# Patient Record
Sex: MALE | Race: BLACK OR AFRICAN AMERICAN | NOT HISPANIC OR LATINO | Employment: OTHER | URBAN - METROPOLITAN AREA
[De-identification: names, ages, dates, MRNs, and addresses within clinical notes are randomized per-mention and may not be internally consistent; named-entity substitution may affect disease eponyms.]

---

## 2017-01-31 ENCOUNTER — OFFICE VISIT (OUTPATIENT)
Dept: UROLOGY | Facility: CLINIC | Age: 67
End: 2017-01-31
Attending: UROLOGY
Payer: COMMERCIAL

## 2017-01-31 VITALS
DIASTOLIC BLOOD PRESSURE: 77 MMHG | BODY MASS INDEX: 39.87 KG/M2 | WEIGHT: 254 LBS | SYSTOLIC BLOOD PRESSURE: 130 MMHG | HEART RATE: 86 BPM | HEIGHT: 67 IN

## 2017-01-31 DIAGNOSIS — Z80.42 FAMILY HISTORY OF PROSTATE CANCER: ICD-10-CM

## 2017-01-31 DIAGNOSIS — R35.1 NOCTURIA: ICD-10-CM

## 2017-01-31 DIAGNOSIS — R97.20 ELEVATED PSA: Primary | ICD-10-CM

## 2017-01-31 LAB
BILIRUB SERPL-MCNC: NORMAL MG/DL
BLOOD URINE, POC: NORMAL
COLOR, POC UA: NORMAL
GLUCOSE UR QL STRIP: NORMAL
KETONES UR QL STRIP: NORMAL
LEUKOCYTE ESTERASE URINE, POC: NORMAL
NITRITE, POC UA: NORMAL
PH, POC UA: 5
PROTEIN, POC: NORMAL
SPECIFIC GRAVITY, POC UA: 1.01
UROBILINOGEN, POC UA: NORMAL

## 2017-01-31 PROCEDURE — 3078F DIAST BP <80 MM HG: CPT | Mod: S$GLB,,, | Performed by: UROLOGY

## 2017-01-31 PROCEDURE — 1157F ADVNC CARE PLAN IN RCRD: CPT | Mod: S$GLB,,, | Performed by: UROLOGY

## 2017-01-31 PROCEDURE — 1159F MED LIST DOCD IN RCRD: CPT | Mod: S$GLB,,, | Performed by: UROLOGY

## 2017-01-31 PROCEDURE — 99999 PR PBB SHADOW E&M-EST. PATIENT-LVL III: CPT | Mod: PBBFAC,,, | Performed by: UROLOGY

## 2017-01-31 PROCEDURE — 1126F AMNT PAIN NOTED NONE PRSNT: CPT | Mod: S$GLB,,, | Performed by: UROLOGY

## 2017-01-31 PROCEDURE — 3075F SYST BP GE 130 - 139MM HG: CPT | Mod: S$GLB,,, | Performed by: UROLOGY

## 2017-01-31 PROCEDURE — 99203 OFFICE O/P NEW LOW 30 MIN: CPT | Mod: 25,S$GLB,, | Performed by: UROLOGY

## 2017-01-31 PROCEDURE — 1160F RVW MEDS BY RX/DR IN RCRD: CPT | Mod: S$GLB,,, | Performed by: UROLOGY

## 2017-01-31 PROCEDURE — 81002 URINALYSIS NONAUTO W/O SCOPE: CPT | Mod: S$GLB,,, | Performed by: UROLOGY

## 2017-01-31 NOTE — LETTER
January 31, 2017      Remy Payne MD  [address]  [facility] [address]           [facility]  [address] [place] [address], [facility] [address]  [facility] [address]  Phone: [phone]          Patient: Bryan Queen   MR Number: [id number]   YOB: 1950   Date of Visit: 1/31/2017       Dear Dr. Remy Payne:    Thank you for referring Bryan Queen to me for evaluation. Attached you will find relevant portions of my assessment and plan of care.    If you have questions, please do not hesitate to call me. I look forward to following Bryan Queen along with you.    Sincerely,    Kieran Evans MD    Enclosure  CC:  No Recipients    If you would like to receive this communication electronically, please contact [email or web address] or [phone] to request more information on [facility] [employer] access.    For providers and/or their staff who would like to refer a patient to [facility], please contact us through our one-stop-shop provider referral line, [facility], at [phone].    If you feel you have received this communication in error or would no longer like to receive these types of communications, please e-mail [email or web address]

## 2017-01-31 NOTE — PROGRESS NOTES
"Subjective:      Bryan Queen is a 66 y.o. male who was referred by pcp for evaluation of elevated PSA.  66-year-old -American man with 2 brothers who have been diagnosed with prostate cancer.  His urinary stream is strong.  He takes Flomax.  He does have nocturia however 3 times per night which is bothersome.          The following portions of the patient's history were reviewed and updated as appropriate: allergies, current medications, past family history, past medical history, past social history, past surgical history and problem list.    Review of Systems  Constitutional: no fever or chills  ENT: no nasal congestion or sore throat  Respiratory: no cough or shortness of breath  Cardiovascular: no chest pain or palpitations  Gastrointestinal: no nausea or vomiting, tolerating diet  Genitourinary: as per HPI  Hematologic/Lymphatic: no easy bruising or lymphadenopathy  Musculoskeletal: no arthralgias or myalgias  Neurological: no seizures or tremors  Behavioral/Psych: no auditory or visual hallucinations     Objective:   Vitals:   Visit Vitals    /77 (BP Location: Right arm, Patient Position: Sitting, BP Method: Automatic)    Pulse 86    Ht 5' 7" (1.702 m)    Wt 115.2 kg (254 lb)    BMI 39.78 kg/m2       Physical Exam   General: alert and oriented, no acute distress  Head: normocephalic, atraumatic  Neck: supple, no lymphadenopathy, normal ROM, no masses  Respiratory: Symmetric expansion, non-labored breathing  Cardiovascular: regular rate and rhythm, nomal pulses, no peripheral edema  Abdomen: Rather protuberant with panniculus and soft, non tender, non distended, no palpable masses, no hernias, no hepatomegaly or splenomegaly  Genitourinary:   Penis: Concealed, normal, no lesions, patent orthotopic meatus, no plaques  Scrotum: no rashes or skin changes;   Testes: descended bilaterally, no masses, nontender, normal epididymides bilaterally, no hydroceles  Prostate: normal for age, normal " consistency, non tender, no specific nodules, 20 g; seminal vesicles not palpated  Rectum: normal rectal tone, no rectal mass, normal perineum  Lymphatic: no inguinal nodes  Skin: normal coloration and turgor, no rashes, no suspicious skin lesions noted  Neuro: alert and oriented x3, no gross deficits  Psych: normal judgment and insight, normal mood/affect and non-anxious    Physical Exam    Lab Review   Urinalysis demonstrates negative for all components  Lab Results   Component Value Date    WBC 9.60 08/20/2015    HGB 11.5 (L) 08/20/2015    HCT 35.6 (L) 08/20/2015    MCV 91 08/20/2015     08/20/2015     Lab Results   Component Value Date    CREATININE 1.3 12/21/2016    BUN 20 12/21/2016     Lab Results   Component Value Date    PSA 4.6 (H) 12/21/2016     Lab Results   Component Value Date    PSA 4.6 (H) 12/21/2016    PSA 1.29 02/27/2013    PSA 1.1 07/01/2011         Imaging  -  Assessment:     1. Elevated PSA    2. Family history of prostate cancer    3. Nocturia      -  Plan:     Orders Placed This Encounter    Prostate Specific Antigen, Diagnostic    POCT URINE DIPSTICK WITHOUT MICROSCOPE       Cont flomax    Avoid pm fluids  Avoid caffeine and alcohol  Timed voiding    Return to clinic 1 month with repeat PSA and postvoid residual.  If PSA is still elevated we will schedule a prostate biopsy

## 2017-01-31 NOTE — MR AVS SNAPSHOT
[facility]  [address] [place] [address], [facility] [address]  [facility] [address]  Phone: [phone]                  Bryan Queen   2017 1:00 PM   Office Visit    Description:  Male : 1950   Provider:  Kieran Evans MD   Department:  [facility]           Reason for Visit     Elevated PSA           Diagnoses this Visit        Comments    Elevated PSA    -  Primary     Family history of prostate cancer         Nocturia                To Do List           Future Appointments        Provider Department Dept Phone    3/7/2017 10:30 AM Kieran Evans MD [facility] [phone]      Goals (5 Years of Data)     None      Follow-Up and Disposition     Return in about 4 weeks (around 2017).      [facility]     [facility] Care [facility] -  Assistance  Registered nurses in the [facility] Center provide clinical advisement, health education, appointment booking, and other advisory services.  Call for this free service at [phone].             Medications           Message regarding Medications     Verify the changes and/or additions to your medication regime listed below are the same as discussed with your clinician today.  If any of these changes or additions are incorrect, please notify your healthcare provider.        STOP taking these medications     ondansetron (ZOFRAN) 8 MG tablet Take 1 tablet (8 mg total) by mouth every 8 (eight) hours as needed for Nausea.    naproxen sodium (ANAPROX) 220 MG tablet Take 220 mg by mouth daily as needed.           Verify that the below list of medications is an accurate representation of the medications you are currently taking.  If none reported, the list may be blank. If incorrect, please contact your healthcare provider. Carry this list with you in case of emergency.           Current Medications     baclofen (LIORESAL) 20 MG tablet Take 1 tablet (20 mg total) by mouth 4 (four) times daily.    duloxetine (CYMBALTA) 60 MG  "capsule Take 1 capsule (60 mg total) by mouth once daily.    tamsulosin (FLOMAX) 0.4 mg Cp24 TAKE 1 CAPSULE (0.4 MG TOTAL) BY MOUTH ONCE DAILY.    docusate sodium (COLACE) 100 MG capsule Take 1 capsule (100 mg total) by mouth 2 (two) times daily as needed for Constipation.    oxycodone-acetaminophen (PERCOCET)  mg per tablet Take 1 tablet by mouth daily as needed for Pain.           Clinical Reference Information           Vital Signs - Last Recorded  Most recent update: 1/31/2017  1:17 PM by Sakshi Loza MA    BP Pulse Ht Wt BMI    130/77 (BP Location: Right arm, Patient Position: Sitting, BP Method: Automatic) 86 5' 7" (1.702 m) 115.2 kg (254 lb) 39.78 kg/m2      Blood Pressure          Most Recent Value    BP  130/77      Allergies as of 1/31/2017     Hydroxyzine    Lyrica [Pregabalin]    Mobic [Meloxicam]    Tramadol      Immunizations Administered on Date of Encounter - 1/31/2017     None      Orders Placed During Today's Visit      Normal Orders This Visit    POCT URINE DIPSTICK WITHOUT MICROSCOPE     Future Labs/Procedures Expected by Expires    Prostate Specific Antigen, Diagnostic  3/3/2017 4/1/2018      [facility] Sign-Up     Activating your [facility] account is as easy as 1-2-3!     1) Visit [email or web address], select Sign Up Now, enter this activation code and your date of birth, then select Next.  [email or web address]  Expires: 2/4/2017  8:45 AM      2) Create a username and password to use when you visit [facility] in the future and select a security question in case you lose your password and select Next.    3) Enter your e-mail address and click Sign Up!    Additional Information  If you have questions, please e-mail [email or web address] or call [phone] to talk to our [facility] staff. Remember, [facility] is NOT to be used for urgent needs. For medical emergencies, dial 911.         "

## 2017-02-08 RX ORDER — OXYCODONE AND ACETAMINOPHEN 10; 325 MG/1; MG/1
1 TABLET ORAL DAILY PRN
Qty: 30 TABLET | Refills: 0 | Status: SHIPPED | OUTPATIENT
Start: 2017-02-08 | End: 2017-05-09 | Stop reason: SDUPTHER

## 2017-02-08 NOTE — TELEPHONE ENCOUNTER
----- Message from Deonna Whitfield sent at 2/8/2017 10:24 AM CST -----  Contact: Patient  [phone]  Patient is calling to request a refill on his oxycodone-acetaminophen (PERCOCET)  mg per tablet.      [facility]/pharmacy #[id number] - [facility] [address] [facility]  [address] [facility] [address]  Phone: [phone] Fax: [phone]

## 2017-03-03 ENCOUNTER — LAB VISIT (OUTPATIENT)
Dept: LAB | Facility: OTHER | Age: 67
End: 2017-03-03
Attending: UROLOGY
Payer: COMMERCIAL

## 2017-03-03 DIAGNOSIS — Z80.42 FAMILY HISTORY OF PROSTATE CANCER: ICD-10-CM

## 2017-03-03 DIAGNOSIS — R97.20 ELEVATED PSA: ICD-10-CM

## 2017-03-03 LAB — COMPLEXED PSA SERPL-MCNC: 4.6 NG/ML

## 2017-03-03 PROCEDURE — 84153 ASSAY OF PSA TOTAL: CPT

## 2017-03-03 PROCEDURE — 36415 COLL VENOUS BLD VENIPUNCTURE: CPT

## 2017-03-07 ENCOUNTER — OFFICE VISIT (OUTPATIENT)
Dept: UROLOGY | Facility: CLINIC | Age: 67
End: 2017-03-07
Attending: UROLOGY
Payer: COMMERCIAL

## 2017-03-07 VITALS
BODY MASS INDEX: 37.67 KG/M2 | SYSTOLIC BLOOD PRESSURE: 143 MMHG | HEART RATE: 85 BPM | WEIGHT: 240 LBS | HEIGHT: 67 IN | DIASTOLIC BLOOD PRESSURE: 72 MMHG

## 2017-03-07 DIAGNOSIS — R97.20 ELEVATED PSA: Primary | ICD-10-CM

## 2017-03-07 LAB
BILIRUB SERPL-MCNC: NORMAL MG/DL
BLOOD URINE, POC: NORMAL
COLOR, POC UA: NORMAL
GLUCOSE UR QL STRIP: NORMAL
KETONES UR QL STRIP: NORMAL
LEUKOCYTE ESTERASE URINE, POC: NORMAL
NITRITE, POC UA: NORMAL
PH, POC UA: 6
POC RESIDUAL URINE VOLUME: 18 ML (ref 0–100)
PROTEIN, POC: NORMAL
SPECIFIC GRAVITY, POC UA: 1.01
UROBILINOGEN, POC UA: NORMAL

## 2017-03-07 PROCEDURE — 1157F ADVNC CARE PLAN IN RCRD: CPT | Mod: S$GLB,,, | Performed by: UROLOGY

## 2017-03-07 PROCEDURE — 87086 URINE CULTURE/COLONY COUNT: CPT

## 2017-03-07 PROCEDURE — 1159F MED LIST DOCD IN RCRD: CPT | Mod: S$GLB,,, | Performed by: UROLOGY

## 2017-03-07 PROCEDURE — 3078F DIAST BP <80 MM HG: CPT | Mod: S$GLB,,, | Performed by: UROLOGY

## 2017-03-07 PROCEDURE — 1126F AMNT PAIN NOTED NONE PRSNT: CPT | Mod: S$GLB,,, | Performed by: UROLOGY

## 2017-03-07 PROCEDURE — 3077F SYST BP >= 140 MM HG: CPT | Mod: S$GLB,,, | Performed by: UROLOGY

## 2017-03-07 PROCEDURE — 1160F RVW MEDS BY RX/DR IN RCRD: CPT | Mod: S$GLB,,, | Performed by: UROLOGY

## 2017-03-07 PROCEDURE — 99214 OFFICE O/P EST MOD 30 MIN: CPT | Mod: S$GLB,,, | Performed by: NURSE PRACTITIONER

## 2017-03-07 PROCEDURE — 81002 URINALYSIS NONAUTO W/O SCOPE: CPT | Mod: S$GLB,,, | Performed by: UROLOGY

## 2017-03-07 PROCEDURE — 51798 US URINE CAPACITY MEASURE: CPT | Mod: S$GLB,,, | Performed by: UROLOGY

## 2017-03-07 PROCEDURE — 99999 PR PBB SHADOW E&M-EST. PATIENT-LVL III: CPT | Mod: PBBFAC,,, | Performed by: UROLOGY

## 2017-03-07 RX ORDER — CIPROFLOXACIN 500 MG/1
500 TABLET ORAL 2 TIMES DAILY
Qty: 4 TABLET | Refills: 0 | Status: SHIPPED | OUTPATIENT
Start: 2017-03-07 | End: 2017-03-10

## 2017-03-07 NOTE — PROGRESS NOTES
"Subjective:      Bryan Queen is a 66 y.o. male who was referred by PCP for evaluation of elevated PSA (latest 4.6).     The patient is an -American male with 2 older brothers who have been diagnosed with prostate cancer.  The patient has never had a prostate biopsy.   He takes flomax daily for his LUTS. Today he reports nocturia 2-3x but denies frequency, urgency, and slowed urinary stream. He has decreased his intake of caffeinated beverages and has reduced his PM fluid intake but continues to have nocturia.     The following portions of the patient's history were reviewed and updated as appropriate: allergies, current medications, past family history, past medical history, past social history, past surgical history and problem list.     Review of Systems  Constitutional: no fever or chills  ENT: no nasal congestion or sore throat  Respiratory: no cough or shortness of breath  Cardiovascular: no chest pain or palpitations  Gastrointestinal: no nausea or vomiting, tolerating diet  Genitourinary: as per HPI  Hematologic/Lymphatic: no easy bruising or lymphadenopathy  Musculoskeletal: no arthralgias or myalgias  Neurological: no seizures or tremors  Behavioral/Psych: no auditory or visual hallucinations     Objective:   Vitals:   BP (!) 143/72  Pulse 85  Ht 5' 7" (1.702 m)  Wt 108.9 kg (240 lb)  BMI 37.59 kg/m2    Physical Exam   General: alert and oriented, no acute distress  Head: normocephalic, atraumatic  Neck: supple, no lymphadenopathy, normal ROM, no masses  Respiratory: Symmetric expansion, non-labored breathing  Cardiovascular: regular rate and rhythm, nomal pulses, no peripheral edema  Abdomen: Rather protuberant with panniculus and soft, non tender, non distended, no palpable masses, no hernias, no hepatomegaly or splenomegaly  Genitourinary:   Penis: Concealed, normal, no lesions, patent orthotopic meatus, no plaques  Scrotum: no rashes or skin changes;   Testes: descended bilaterally, no " masses, nontender, normal epididymides bilaterally, no hydroceles  Prostate: normal for age, normal consistency, non tender, no specific nodules, 20 g; seminal vesicles not palpated  Rectum: normal rectal tone, no rectal mass, normal perineum  Lymphatic: no inguinal nodes  Skin: normal coloration and turgor, no rashes, no suspicious skin lesions noted  Neuro: alert and oriented x3, no gross deficits  Psych: normal judgment and insight, normal mood/affect and non-anxious    Physical Exam    Lab Review   Urinalysis demonstrates negative for all components  PVR =  18 mL   Lab Results   Component Value Date    WBC 9.60 08/20/2015    HGB 11.5 (L) 08/20/2015    HCT 35.6 (L) 08/20/2015    MCV 91 08/20/2015     08/20/2015     Lab Results   Component Value Date    CREATININE 1.3 12/21/2016    BUN 20 12/21/2016     Lab Results   Component Value Date    PSA 4.6 (H) 12/21/2016     Component PSA, SCREEN PSA DIAGNOSTIC   Latest Ref Rng & Units 0.00 - 4.00 ng/mL 0.00 - 4.00 ng/mL   3/3/2017  4.6 (H)   12/21/2016 4.6 (H)    2/27/2013 1.29    7/1/2011 1.1    11/30/2009 1.2    3/18/2008 1.0    3/2/2006 1.0    1/28/2004 0.8        Imaging  None     Assessment:     1. Elevated PSA      Plan:     Bryan was seen today for follow-up.    Diagnoses and all orders for this visit:    Elevated PSA  -     POCT URINE DIPSTICK WITHOUT MICROSCOPE  -     POCT Bladder Scan  -     Urine culture  -     Transrectal Ultrasound w/ Biopsy; Future  -     ciprofloxacin HCl (CIPRO) 500 MG tablet; Take 1 tablet (500 mg total) by mouth 2 (two) times daily. Start taking the first dose the day before the biopsy in the am.      Plan: Continue flomax. Avoid pm fluids. Avoid caffeine and alcohol.   Discussed the concern over the continued elevation of the PSA with the patient, especially with his strong family history. Discussed the risks and benefits of doing the prostate biopsy. Patient agreeable and biopsy scheduled today.

## 2017-03-07 NOTE — MR AVS SNAPSHOT
[facility]  [address] [facility] [address], [address]  [facility] [address]  Phone: [phone]                  Bryan Queen   3/7/2017 10:30 AM   Office Visit    Description:  Male : 1950   Provider:  Kieran Evans MD   Department:  [facility]           Reason for Visit     Follow-up           Diagnoses this Visit        Comments    Elevated PSA    -  Primary            To Do List           Future Appointments        Provider Department Dept Phone    3/10/2017 11:15 AM Kieran Evans MD [facility] [phone]    2017 10:40 AM Gary Stout MD [place] [facility] [phone]      Goals (5 Years of Data)     None       These Medications        Disp Refills Start End    ciprofloxacin HCl (CIPRO) 500 MG tablet 4 tablet 0 3/7/2017 3/9/2017    Take 1 tablet (500 mg total) by mouth 2 (two) times daily. Start taking the first dose the day before the biopsy in the am. - Oral    Pharmacy: [facility]/pharmacy #[id number] [address] #: [phone]         [facility] On Call     [facility] On Call Nurse Care Line -  Assistance  Registered nurses in the [facility] On Call Center provide clinical advisement, health education, appointment booking, and other advisory services.  Call for this free service at [phone].             Medications           Message regarding Medications     Verify the changes and/or additions to your medication regime listed below are the same as discussed with your clinician today.  If any of these changes or additions are incorrect, please notify your healthcare provider.        START taking these NEW medications        Refills    ciprofloxacin HCl (CIPRO) 500 MG tablet 0    Sig: Take 1 tablet (500 mg total) by mouth 2 (two) times daily. Start taking the first dose the day before the biopsy in the am.    Class: Normal    Route: Oral           Verify that the below list of medications is an accurate representation of the  "medications you are currently taking.  If none reported, the list may be blank. If incorrect, please contact your healthcare provider. Carry this list with you in case of emergency.           Current Medications     baclofen (LIORESAL) 20 MG tablet Take 1 tablet (20 mg total) by mouth 4 (four) times daily.    docusate sodium (COLACE) 100 MG capsule Take 1 capsule (100 mg total) by mouth 2 (two) times daily as needed for Constipation.    duloxetine (CYMBALTA) 60 MG capsule Take 1 capsule (60 mg total) by mouth once daily.    oxycodone-acetaminophen (PERCOCET)  mg per tablet Take 1 tablet by mouth daily as needed for Pain.    tamsulosin (FLOMAX) 0.4 mg Cp24 TAKE 1 CAPSULE (0.4 MG TOTAL) BY MOUTH ONCE DAILY.    ciprofloxacin HCl (CIPRO) 500 MG tablet Take 1 tablet (500 mg total) by mouth 2 (two) times daily. Start taking the first dose the day before the biopsy in the am.           Clinical Reference Information           Your Vitals Were     BP Pulse Height Weight BMI    143/72 85 5' 7" (1.702 m) 108.9 kg (240 lb) 37.59 kg/m2      Blood Pressure          Most Recent Value    BP  (!)  143/72      Allergies as of 3/7/2017     Hydroxyzine    Lyrica [Pregabalin]    Mobic [Meloxicam]    Tramadol      Immunizations Administered on Date of Encounter - 3/7/2017     None      Orders Placed During Today's Visit      Normal Orders This Visit    POCT Bladder Scan     POCT URINE DIPSTICK WITHOUT MICROSCOPE     Urine culture     Future Labs/Procedures Expected by Expires    Transrectal Ultrasound w/ Biopsy  3/7/2017 3/7/2018      [facility] Sign-Up     Activating your [facility] account is as easy as 1-2-3!     1) Visit [email or web address], select Sign Up Now, enter this activation code and your date of birth, then select Next.  [email or web address]  Expires: 4/21/2017 11:06 AM      2) Create a username and password to use when you visit [facility] in the future and select a security question in case you lose your password and select " Next.    3) Enter your e-mail address and click Sign Up!    Additional Information  If you have questions, please e-mail [email or web address] or call [phone] to talk to our [facility] staff. Remember, [facility] is NOT to be used for urgent needs. For medical emergencies, dial 911.         Language Assistance Services     ATTENTION: Language assistance services are available, free of charge. Please call [phone].      ATENCIÓN: Si habla sandro, tiene a norton disposición servicios gratuitos de asistencia lingüística. Llame al [phone].     CHÚ Ý: N?u b?n nói Ti?ng Vi?t, có các d?ch v? h? tr? ngôn ng? mi?n phí dành cho b?n. G?i s? [phone].         [facility] complies with applicable Federal civil rights laws and does not discriminate on the basis of race, color, national origin, age, disability, or sex.

## 2017-03-08 LAB — BACTERIA UR CULT: NO GROWTH

## 2017-03-10 ENCOUNTER — PROCEDURE VISIT (OUTPATIENT)
Dept: UROLOGY | Facility: CLINIC | Age: 67
End: 2017-03-10
Attending: UROLOGY
Payer: COMMERCIAL

## 2017-03-10 VITALS
WEIGHT: 240 LBS | HEIGHT: 67 IN | BODY MASS INDEX: 37.67 KG/M2 | SYSTOLIC BLOOD PRESSURE: 132 MMHG | DIASTOLIC BLOOD PRESSURE: 73 MMHG | HEART RATE: 70 BPM

## 2017-03-10 DIAGNOSIS — R97.20 ELEVATED PSA: ICD-10-CM

## 2017-03-10 LAB
BILIRUB SERPL-MCNC: NORMAL MG/DL
BLOOD URINE, POC: NORMAL
COLOR, POC UA: NORMAL
GLUCOSE UR QL STRIP: NORMAL
KETONES UR QL STRIP: NORMAL
LEUKOCYTE ESTERASE URINE, POC: NORMAL
NITRITE, POC UA: NORMAL
PH, POC UA: 5
PROTEIN, POC: NORMAL
SPECIFIC GRAVITY, POC UA: 1.02
UROBILINOGEN, POC UA: NORMAL

## 2017-03-10 PROCEDURE — 81002 URINALYSIS NONAUTO W/O SCOPE: CPT | Mod: S$GLB,,, | Performed by: UROLOGY

## 2017-03-10 PROCEDURE — 76942 ECHO GUIDE FOR BIOPSY: CPT | Mod: 59,S$GLB,, | Performed by: UROLOGY

## 2017-03-10 PROCEDURE — 96372 THER/PROPH/DIAG INJ SC/IM: CPT | Mod: 59,S$GLB,, | Performed by: UROLOGY

## 2017-03-10 PROCEDURE — 88342 IMHCHEM/IMCYTCHM 1ST ANTB: CPT | Mod: 26,,, | Performed by: PATHOLOGY

## 2017-03-10 PROCEDURE — 55700 TRANSRECTAL ULTRASOUND: CPT | Mod: S$GLB,,, | Performed by: UROLOGY

## 2017-03-10 PROCEDURE — 88305 TISSUE EXAM BY PATHOLOGIST: CPT | Performed by: PATHOLOGY

## 2017-03-10 PROCEDURE — 88341 IMHCHEM/IMCYTCHM EA ADD ANTB: CPT | Mod: 26,,, | Performed by: PATHOLOGY

## 2017-03-10 PROCEDURE — 88305 TISSUE EXAM BY PATHOLOGIST: CPT | Mod: 26,,, | Performed by: PATHOLOGY

## 2017-03-10 PROCEDURE — 76872 US TRANSRECTAL: CPT | Mod: S$GLB,,, | Performed by: UROLOGY

## 2017-03-10 RX ORDER — GENTAMICIN SULFATE 40 MG/ML
80 INJECTION, SOLUTION INTRAMUSCULAR; INTRAVENOUS
Status: COMPLETED | OUTPATIENT
Start: 2017-03-10 | End: 2017-03-10

## 2017-03-10 RX ORDER — LIDOCAINE HYDROCHLORIDE 20 MG/ML
JELLY TOPICAL
Status: COMPLETED | OUTPATIENT
Start: 2017-03-10 | End: 2017-03-10

## 2017-03-10 RX ORDER — LIDOCAINE HYDROCHLORIDE 10 MG/ML
20 INJECTION INFILTRATION; PERINEURAL
Status: COMPLETED | OUTPATIENT
Start: 2017-03-10 | End: 2017-03-10

## 2017-03-10 RX ADMIN — LIDOCAINE HYDROCHLORIDE: 20 JELLY TOPICAL at 12:03

## 2017-03-10 RX ADMIN — LIDOCAINE HYDROCHLORIDE 20 ML: 10 INJECTION INFILTRATION; PERINEURAL at 12:03

## 2017-03-10 RX ADMIN — GENTAMICIN SULFATE 80 MG: 40 INJECTION, SOLUTION INTRAMUSCULAR; INTRAVENOUS at 11:03

## 2017-03-10 NOTE — PROCEDURES
"TRUS  Date/Time: 3/10/2017 11:51 AM  Performed by: JORDON PICKARD  Authorized by: JORDON PICKARD     Consent Done?:  Yes (Written)  Time out: Immediately prior to procedure a "time out" was called to verify the correct patient, procedure, equipment, support staff and site/side marked as required.    Indications: Elevated PSA    Preparation: Patient was prepped and draped in usual sterile fashion    Position:  Left lateral  Anesthesia:  Lidocaine jelly and 20cc's 1% Lidocaine  Patient sedated: No    Prostate Size:  19.2  Lesions:: No    Left Base Biopsies: 2  Left Mid Biopsies: 2  Left [place] Biopsies: 2  Right Base Biopsies: 2  Right Mid Biopsies: 2  Right [place] Biopsies: 2  Total Biopsies:  12    Patient tolerance:  Patient tolerated the procedure well with no immediate complications     Urine cs neg  preop abx and enemas    Elevated psa  fam hist prostate ca    rtc 1 week      "

## 2017-03-10 NOTE — PATIENT INSTRUCTIONS

## 2017-03-21 ENCOUNTER — OFFICE VISIT (OUTPATIENT)
Dept: UROLOGY | Facility: CLINIC | Age: 67
End: 2017-03-21
Attending: UROLOGY
Payer: COMMERCIAL

## 2017-03-21 VITALS
DIASTOLIC BLOOD PRESSURE: 70 MMHG | SYSTOLIC BLOOD PRESSURE: 118 MMHG | BODY MASS INDEX: 37.67 KG/M2 | WEIGHT: 240 LBS | HEART RATE: 76 BPM | HEIGHT: 67 IN

## 2017-03-21 DIAGNOSIS — C61 PROSTATE CANCER: Primary | ICD-10-CM

## 2017-03-21 PROCEDURE — 99215 OFFICE O/P EST HI 40 MIN: CPT | Mod: S$GLB,,, | Performed by: UROLOGY

## 2017-03-21 PROCEDURE — 1126F AMNT PAIN NOTED NONE PRSNT: CPT | Mod: S$GLB,,, | Performed by: UROLOGY

## 2017-03-21 PROCEDURE — 3074F SYST BP LT 130 MM HG: CPT | Mod: S$GLB,,, | Performed by: UROLOGY

## 2017-03-21 PROCEDURE — 3078F DIAST BP <80 MM HG: CPT | Mod: S$GLB,,, | Performed by: UROLOGY

## 2017-03-21 PROCEDURE — 1160F RVW MEDS BY RX/DR IN RCRD: CPT | Mod: S$GLB,,, | Performed by: UROLOGY

## 2017-03-21 PROCEDURE — 1157F ADVNC CARE PLAN IN RCRD: CPT | Mod: S$GLB,,, | Performed by: UROLOGY

## 2017-03-21 PROCEDURE — 99999 PR PBB SHADOW E&M-EST. PATIENT-LVL III: CPT | Mod: PBBFAC,,, | Performed by: UROLOGY

## 2017-03-21 PROCEDURE — 1159F MED LIST DOCD IN RCRD: CPT | Mod: S$GLB,,, | Performed by: UROLOGY

## 2017-03-21 NOTE — PROGRESS NOTES
"Subjective:      Bryan Queen is a 66 y.o. male who returns today regarding his     Here to discuss biopsy results.  No complications following biopsy..    The following portions of the patient's history were reviewed and updated as appropriate: allergies, current medications, past family history, past medical history, past social history, past surgical history and problem list.    Review of Systems  Pertinent items are noted in HPI.  A comprehensive multipoint review of systems was negative except as otherwise stated in the HPI.     Objective:   Vitals: /70 (BP Location: Right arm, Patient Position: Sitting, BP Method: Automatic)  Pulse 76  Ht 5' 7" (1.702 m)  Wt 108.9 kg (240 lb)  BMI 37.59 kg/m2    Physical Exam   General: alert and oriented, no acute distress  Respiratory: Symmetric expansion, non-labored breathing  Cardiovascular: no peripheral edema  Abdomen:  non distended  Skin: normal coloration and turgor, no rashes, no suspicious skin lesions noted  Neuro: no gross deficits  Psych: normal judgment and insight, normal mood/affect and non-anxious    Physical Exam    Lab Review   Urinalysis demonstrates     FINAL PATHOLOGIC DIAGNOSIS  PROSTATE BIOPSIES 1, 3, 4, 5, AND 6:  NO CARCINOMA IDENTIFIED  2. BIOPSIES OF LEFT MID PROSTATE:  ADENOCARCINOMA TOTAL ZACK SCORE 6 (3+3)--SEE DESCRIPTION  ASR  Diagnosed by: James Friedman M.D.  (Electronically Signed: 2017-03-15 08:36:08)  Microscopic Examination  2. A microscopic focus of carcinoma involves under 5% of this specimen. In this region there are glands with a  smaller than usual caliber, somewhat erratically infiltrating. There is slight nuclear enlargement with a few visible  nucleoli. These glands lack a basal layer with the p63 and high molecular weight cytokeratin stains. There is some  AMACR positivity in this region, and some other glands in the specimen have at least some positivity. The positive and  negative controls stained appropriately. " The pattern of the carcinoma is interpreted as Murray grade 3. A few foci  suggest the possibility of early fused glands, but this picture may be the result of some tangential sectioning. If there is  subsequently more tissue for evaluation, it is possible there might be a grade 4 component.    Lab Results   Component Value Date    PSA 4.6 (H) 12/21/2016    PSA 1.29 02/27/2013    PSA 1.1 07/01/2011    PSADIAG 4.6 (H) 03/03/2017           Lab Results   Component Value Date    WBC 9.60 08/20/2015    HGB 11.5 (L) 08/20/2015    HCT 35.6 (L) 08/20/2015    MCV 91 08/20/2015     08/20/2015     Lab Results   Component Value Date    CREATININE 1.3 12/21/2016    BUN 20 12/21/2016       Imaging  TRUS 19g  psa density 0.24    Assessment and Plan:   Prostate cancer murray 6 [id number]; psa 4.6  -     MRI Pelvis W WO Contrast; Future; Expected date: 5/21/17  -     Basic metabolic panel; Future; Expected date: 5/21/17    We discussed his grade and stage of disease, life expectancy and active surveillance vs treatment.  We discussed the roles of surgery, radiation, hormonal therapy and chemotherapy in the treatment of prostate cancer.  We discussed brachytherapy and external radiation therapy and open and robotic surgery.  We discussed the risks and benefits of each. We discussed erectile dysfunction, urinary incontinence and bowel issues.  We discussed referral to radiation oncology to further discuss options.  I answered his questions.    Mr. Queen would like to pursue active surveillance.  I'm somewhat concerned that he may harbor more aggressive disease- given his recent rapid rise in PSA, in addition to his elevated PSA density, -American heritage, strong family history and possible component of murray grade 4 disease.  We will obtain a multiparametric MR in 2 months and have him return to reevaluate the situation.  We will discuss his case at  oncology conference also.    40 min face to face; more than 50%  time spent counseling and coordinating care

## 2017-03-23 RX ORDER — TAMSULOSIN HYDROCHLORIDE 0.4 MG/1
CAPSULE ORAL
Qty: 90 CAPSULE | Refills: 1 | Status: SHIPPED | OUTPATIENT
Start: 2017-03-23 | End: 2017-10-27 | Stop reason: SDUPTHER

## 2017-03-28 ENCOUNTER — DOCUMENTATION ONLY (OUTPATIENT)
Dept: UROLOGY | Facility: HOSPITAL | Age: 67
End: 2017-03-28

## 2017-03-28 NOTE — PATIENT CARE CONFERENCE
[facility]      GENITOURINARY MULTIDISCIPLINARY TUMOR BOARD  PATIENT REVIEW FORM     CLINIC #: [id number]  DATE: 03/28/2017    TUMOR SITE:   Prostate    ATTENDING:   Kieran Evans M.D.    PATIENT SUMMARY:   Bryan Queen is a 66 y.o. male with history of an elevated PSA of 4.6 from 1.2 3 years ago and a family history of two brothers with prostate cancer. Recent prostate biopsy showed murray 3+4=7 PCa. He has an elevated PSA density as well.    DISCUSSION:    PERFORMANCE STATUS:  ECOG 0    CCI: 4    Estimated GFR/CKD Stage: >60 CKD 1    FACULTY IN ATTENDANCE:    Pathology: Surinder Magallon MD [x]   Urologic Oncology: Cesar Velasquez MD [] , Bhargav Beckford MD [] , Kieran Evans MD [x] , Gary Andrade MD []  Genitourinary Medical Oncology: Negra Feliciano MD [] , Karson Logan MD []   Radiation Oncology: Arturo Ortiz MD []     CONSULT NEEDED:     [x] Urologic Oncology    [] Hem/Onc    [] Rad/Onc   []     [] Physical/Occupational Therapy    [] Psychology  [] Other: ___________________    Clinical/Pathologic Stage (TNM): Tumor T1c clinical Node(s) 0 Metastasis 0    [x] Treatment Guidelines (NCCN and AUA) reviewed and care planned is consistent with guidelines.    PRESENTATION AT CANCER CONFERENCE:         [x] Prospective    [] Retrospective     [] Follow-Up          [] Eligible for clinical trial    TUMOR BOARD RECOMMENDATIONS/PLAN/CONSENSUS:     Case discussed among group. Pathology and radiologic images were reviewed (if applicable).    Follow-up in 2 months with an MRI. Offer radical prostatectomy considering his murray score and elevated PSA density.

## 2017-04-20 ENCOUNTER — HOSPITAL ENCOUNTER (EMERGENCY)
Facility: OTHER | Age: 67
Discharge: HOME | End: 2017-04-20
Attending: EMERGENCY MEDICINE
Payer: COMMERCIAL

## 2017-04-20 VITALS
TEMPERATURE: 98 F | RESPIRATION RATE: 16 BRPM | OXYGEN SATURATION: 98 % | HEART RATE: 74 BPM | SYSTOLIC BLOOD PRESSURE: 143 MMHG | WEIGHT: 240 LBS | DIASTOLIC BLOOD PRESSURE: 84 MMHG | BODY MASS INDEX: 37.67 KG/M2 | HEIGHT: 67 IN

## 2017-04-20 DIAGNOSIS — B35.3 TINEA PEDIS OF RIGHT FOOT: ICD-10-CM

## 2017-04-20 DIAGNOSIS — B35.6 TINEA CRURIS: Primary | ICD-10-CM

## 2017-04-20 PROCEDURE — 99283 EMERGENCY DEPT VISIT LOW MDM: CPT

## 2017-04-20 RX ORDER — MUPIROCIN 20 MG/G
OINTMENT TOPICAL 2 TIMES DAILY
Qty: 22 G | Refills: 0 | Status: SHIPPED | OUTPATIENT
Start: 2017-04-20 | End: 2017-04-30

## 2017-04-20 RX ORDER — FLUOCINONIDE 0.5 MG/G
OINTMENT TOPICAL 2 TIMES DAILY
Qty: 60 G | Refills: 0 | Status: SHIPPED | OUTPATIENT
Start: 2017-04-20 | End: 2018-01-29 | Stop reason: SDUPTHER

## 2017-04-20 RX ORDER — CLOTRIMAZOLE 1 %
CREAM (GRAM) TOPICAL
Qty: 15 G | Refills: 0 | Status: SHIPPED | OUTPATIENT
Start: 2017-04-20 | End: 2018-01-29 | Stop reason: SDUPTHER

## 2017-04-20 NOTE — ED AVS SNAPSHOT
[facility] EMERGENCY DEPARTMENT  [address]  [place] [address]               Bryan Queen   2017  7:46 PM   ED    Description:  Male : 1950   Department:  [facility] Emergency Department           Your Care was Coordinated By:     Provider Role From To    Marilia Bartlett MD Attending Provider 17 --      Reason for Visit     Rash           Diagnoses this Visit        Comments    Tinea cruris    -  Primary     Tinea pedis of right foot           ED Disposition     ED Disposition Condition Comment    Discharge  Patient discharged to home in stable condition.              To Do List           Follow-up Information     Follow up with Remy Payne MD. Call in 1 week.    Specialty:  Internal Medicine    Why:  for re-evaluation of today's complaint, and ongoing care    Contact information:    [address]  [facility] [address]  [phone]         These Medications        Disp Refills Start End    clotrimazole (LOTRIMIN) 1 % cream 15 g 0 2017     Apply to affected area 2 times daily    Pharmacy: [facility] [address] Ph #: [phone]       mupirocin (BACTROBAN) 2 % ointment 22 g 0 2017    Apply topically 2 (two) times daily. - Topical (Top)    Pharmacy: [id number] [address] Ph #: [phone]       fluocinonide (LIDEX) 0.05 % ointment 60 g 0 2017    Apply topically 2 (two) times daily. DO NOT USE ON FACE - Topical (Top)    Pharmacy: [facility] [address] Ph #: [phone]         [facility]     [place] On Call Nurse Care Line -  Assistance  Unless otherwise directed by your provider, please contact [facility], our nurse care line that is available for  assistance.     Registered nurses in the [facility] provide: appointment scheduling, clinical advisement, health education, and other  advisory services.  Call: [phone] (toll free)               Medications           Message regarding Medications     Verify the changes and/or additions to your medication regime listed below are the same as discussed with your clinician today.  If any of these changes or additions are incorrect, please notify your healthcare provider.        START taking these NEW medications        Refills    clotrimazole (LOTRIMIN) 1 % cream 0    Sig: Apply to affected area 2 times daily    Class: Normal    mupirocin (BACTROBAN) 2 % ointment 0    Sig: Apply topically 2 (two) times daily.    Class: Normal    Route: Topical (Top)    fluocinonide (LIDEX) 0.05 % ointment 0    Sig: Apply topically 2 (two) times daily. DO NOT USE ON FACE    Class: Normal    Route: Topical (Top)           Verify that the below list of medications is an accurate representation of the medications you are currently taking.  If none reported, the list may be blank. If incorrect, please contact your healthcare provider. Carry this list with you in case of emergency.           Current Medications     baclofen (LIORESAL) 20 MG tablet Take 1 tablet (20 mg total) by mouth 4 (four) times daily.    clotrimazole (LOTRIMIN) 1 % cream Apply to affected area 2 times daily    docusate sodium (COLACE) 100 MG capsule Take 1 capsule (100 mg total) by mouth 2 (two) times daily as needed for Constipation.    duloxetine (CYMBALTA) 60 MG capsule Take 1 capsule (60 mg total) by mouth once daily.    fluocinonide (LIDEX) 0.05 % ointment Apply topically 2 (two) times daily. DO NOT USE ON FACE    mupirocin (BACTROBAN) 2 % ointment Apply topically 2 (two) times daily.    oxycodone-acetaminophen (PERCOCET)  mg per tablet Take 1 tablet by mouth daily as needed for Pain.    tamsulosin (FLOMAX) 0.4 mg Cp24 TAKE 1 CAPSULE (0.4 MG TOTAL) BY MOUTH ONCE DAILY.           Clinical Reference Information           Your Vitals Were     BP Pulse Temp Resp Height Weight    134/78 (BP  "Location: Right arm, Patient Position: Sitting) 88 97.9 °F (36.6 °C) (Oral) 16 5' 7" (1.702 m) 108.9 kg (240 lb)    SpO2 BMI             98% 37.59 kg/m2         Allergies as of 4/20/2017        Reactions    Hydroxyzine Nausea And Vomiting, Other (See Comments)    SWEATING    Lyrica [Pregabalin] Rash    Mobic [Meloxicam] Nausea And Vomiting    Tinnitus    Tramadol Nausea And Vomiting    Nauseous,Sweating, Ringing in the ears      Immunizations Administered on Date of Encounter - 4/20/2017     None      ED Micro, Lab, POCT     None      ED Imaging Orders     None        Discharge Instructions         Fungal Skin Infection (Tinea)  A fungal infection is when too much fungus grows on or in the body. Fungus normally lives on the skin in small amounts and does not cause harm. But when too much grows on the skin, it causes an infection. This is also known as tinea. Fungal skin infections are common and not often serious.  The infection often starts as a small red area the size of a pea. The skin may turn dry and flaky. The area may itch. As the fungus grows, it spreads out in a red [place]. Because of how it looks, fungal skin infection is often called ringworm, but it is not caused by a worm. Fungal skin infections can occur on many parts of the body. They can grow on the head, chest, arms, or legs. They can occur on the buttocks. On the feet, fungal infection is known as athletes foot. It causes itchy, sometimes painful sores between the toes and the bottom or sides of the feet. In the groin, the rash is called jock itch.  People with weakened immune systems can get a fungal infection more easily. This includes people with diabetes or HIV, or who are being treated for cancer. In these cases, the fungal infection can spread and cause severe illness. Fungal infections are also more common in people who are obese.  In most cases, treatment is done with antifungal cream or ointment. If the infection is on your scalp, you " may take oral medication. In some cases, a tiny piece of the skin (biopsy) may be taken. This is so it can be tested in a lab.  Common fungal infections are treated with creams on the skin or oral medicine.  Home care  Follow all instructions when using antifungal cream or ointment on your skin. The health care provider may advise using cornstarch powder to keep your skin dry or petroleum jelly to provide a barrier.  General care:  · If you were prescribed an oral medicine, read the patient information. Talk with the health care provider about the risks and side effects.  · Let your skin dry completely after bathing. Carefully dry your feet and between your toes.  · Dress in loose cotton clothing.  · Dont scratch the affected area. This can delay healing and may spread the infection. It can also cause a bacterial infection.  · Keep your skin clean, but dont wash the skin too much. This can irritate your skin.  · Keep in mind that it may take a week before the fungus starts to go away. It can take 2 to 4 weeks to fully clear. To prevent it from coming back, use the medicine until the rash is all gone.  Follow-up care  Follow up with your health care provider if the rash does not get better after 10 days of treatment. Also follow up if the rash spreads to other parts of your body.  When to seek medical advice  Call your health care provider right away if any of these occur:  · Fever of 100.4°F (38°C) or higher  · Redness or swelling that gets worse  · Pain that gets worse  · Foul-smelling fluid leaking from the skin  Date Last Reviewed: 7/23/2014  © 4007-9190 [employer]. [address] [place] [address], [place] [address]. All rights reserved. This information is not intended as a substitute for professional medical care. Always follow your healthcare professional's instructions.          Your Scheduled Appointments     May 09, 2017 10:40 AM CDT   Established Patient Visit with Gary Stout MD   [place]  - [facility] ([facility])    [address]  [facility] [address]   [phone]            May 26, 2017 10:30 AM CDT   Mri Pelvis Cont with WB MRI1   [facility] ([facility])    [place] [address]   [phone]            Jun 02, 2017 10:30 AM CDT   Established Patient Visit with Kieran Evans MD   [place] - [facility] ([facility])    [address] [place] [address], [address]  [facility] [id number] [phone]              [facility] Sign-Up     Activating your [facility] account is as easy as 1-2-3!     1) Visit [email or web address], select Sign Up Now, enter this activation code and your date of birth, then select Next.  [email or web address]  Expires: 4/21/2017 12:06 PM      2) Create a username and password to use when you visit [facility] in the future and select a security question in case you lose your password and select Next.    3) Enter your e-mail address and click Sign Up!    Additional Information  If you have questions, please e-mail [email or web address] or call [phone] to talk to our [facility] staff. Remember, [facility] is NOT to be used for urgent needs. For medical emergencies, dial 911.          [facility] Emergency Department complies with applicable Federal civil rights laws and does not discriminate on the basis of race, color, national origin, age, disability, or sex.        Language Assistance Services     ATTENTION: Language assistance services are available, free of charge. Please call [phone].      ATENCIÓN: Si habla español, tiene a norton disposición servicios gratuitos de asistencia lingüística. Llame al [phone].     CHÚ Ý: N?u b?n nói Ti?ng Vi?t, có các d?ch v? h? tr? ngôn ng? mi?n phí dành cho b?n. G?i s? [phone].

## 2017-04-21 NOTE — DISCHARGE INSTRUCTIONS
Fungal Skin Infection (Tinea)  A fungal infection is when too much fungus grows on or in the body. Fungus normally lives on the skin in small amounts and does not cause harm. But when too much grows on the skin, it causes an infection. This is also known as tinea. Fungal skin infections are common and not often serious.  The infection often starts as a small red area the size of a pea. The skin may turn dry and flaky. The area may itch. As the fungus grows, it spreads out in a red [place]. Because of how it looks, fungal skin infection is often called ringworm, but it is not caused by a worm. Fungal skin infections can occur on many parts of the body. They can grow on the head, chest, arms, or legs. They can occur on the buttocks. On the feet, fungal infection is known as athletes foot. It causes itchy, sometimes painful sores between the toes and the bottom or sides of the feet. In the groin, the rash is called jock itch.  People with weakened immune systems can get a fungal infection more easily. This includes people with diabetes or HIV, or who are being treated for cancer. In these cases, the fungal infection can spread and cause severe illness. Fungal infections are also more common in people who are obese.  In most cases, treatment is done with antifungal cream or ointment. If the infection is on your scalp, you may take oral medication. In some cases, a tiny piece of the skin (biopsy) may be taken. This is so it can be tested in a lab.  Common fungal infections are treated with creams on the skin or oral medicine.  Home care  Follow all instructions when using antifungal cream or ointment on your skin. The health care provider may advise using cornstarch powder to keep your skin dry or petroleum jelly to provide a barrier.  General care:  · If you were prescribed an oral medicine, read the patient information. Talk with the health care provider about the risks and side effects.  · Let your skin dry  completely after bathing. Carefully dry your feet and between your toes.  · Dress in loose cotton clothing.  · Dont scratch the affected area. This can delay healing and may spread the infection. It can also cause a bacterial infection.  · Keep your skin clean, but dont wash the skin too much. This can irritate your skin.  · Keep in mind that it may take a week before the fungus starts to go away. It can take 2 to 4 weeks to fully clear. To prevent it from coming back, use the medicine until the rash is all gone.  Follow-up care  Follow up with your health care provider if the rash does not get better after 10 days of treatment. Also follow up if the rash spreads to other parts of your body.  When to seek medical advice  Call your health care provider right away if any of these occur:  · Fever of 100.4°F (38°C) or higher  · Redness or swelling that gets worse  · Pain that gets worse  · Foul-smelling fluid leaking from the skin  Date Last Reviewed: 7/23/2014  © 4908-9158 [employer]. [address] [place] [address], [place] [address]. All rights reserved. This information is not intended as a substitute for professional medical care. Always follow your healthcare professional's instructions.

## 2017-04-21 NOTE — ED PROVIDER NOTES
Encounter Date: 4/20/2017       History     Chief Complaint   Patient presents with    Rash     x3 weeks, itching, redness, no relief with hydrocortisone and other OTC meds.      Review of patient's allergies indicates:   Allergen Reactions    Hydroxyzine Nausea And Vomiting and Other (See Comments)     SWEATING    Lyrica [pregabalin] Rash    Mobic [meloxicam] Nausea And Vomiting     Tinnitus    Tramadol Nausea And Vomiting     Nauseous,Sweating, Ringing in the ears     Patient is a 66 y.o. male presenting with the following complaint: rash.   Rash    This is a chronic problem. The current episode started several weeks ago (3-4 WEEKS). The problem has been unchanged. The problem is associated with an unknown factor. The rash is present on the groin. The pain has been constant since onset. Associated symptoms include itching and weeping. He has tried anti-itch cream and antihistamines for the symptoms. The treatment provided mild relief.   using cortisone and benadryl cream  Past Medical History:   Diagnosis Date    Arthritis     BPH (benign prostatic hypertrophy)     Colon polyps     Elevated PSA     Groin abscess     Lumbar radiculopathy     Obstructive sleep apnea (adult) (pediatric)     CPAP hs    PONV (postoperative nausea and vomiting)     Spinal stenosis      Past Surgical History:   Procedure Laterality Date    CERVICAL FUSION  1/27/2009    C3-4 fusion    COLONOSCOPY N/A 9/13/2016    Procedure: COLONOSCOPY;  Surgeon: ROWENA Ahn MD;  Location: [address];  Service: Endoscopy;  Laterality: N/A;  Patient requested the week of 9/12. Patient reports PONV.    KNEE SURGERY Left 4-13-15    TK    KNEE SURGERY Right 8-19-15    TK    LUMBAR DISCECTOMY  12/3/2009    L5-S1 microdiscex    SPINE SURGERY  04/01/13    L4/5 laminectomy    TONSILLECTOMY       Family History   Problem Relation Age of Onset    Diabetes Mother     Hypertension Mother     Diabetes Sister     Diabetes Brother       Social History   Substance Use Topics    Smoking status: Never Smoker    Smokeless tobacco: Never Used    Alcohol use Yes      Comment: occasional     Review of Systems   Constitutional: Negative for chills and fever.   HENT: Negative.    Eyes: Negative.    Respiratory: Negative for cough, shortness of breath and stridor.    Cardiovascular: Negative for chest pain and palpitations.   Gastrointestinal: Negative for nausea and vomiting.   Genitourinary: Negative for dysuria and hematuria.   Musculoskeletal: Negative.    Skin: Positive for itching and rash.   Neurological: Negative for dizziness and headaches.   Psychiatric/Behavioral: Negative.    All other systems reviewed and are negative.      Physical Exam   Initial Vitals   BP Pulse Resp Temp SpO2   04/20/17 1915 04/20/17 1915 04/20/17 1915 04/20/17 1915 04/20/17 1915   134/78 88 16 97.9 °F (36.6 °C) 98 %     Physical Exam    Nursing note and vitals reviewed.  Constitutional: He appears well-developed and well-nourished. He is not diaphoretic. No distress.   HENT:   Head: Normocephalic and atraumatic.   Mouth/Throat: Oropharynx is clear and moist. No oropharyngeal exudate.   Eyes: EOM are normal. Pupils are equal, round, and reactive to light. No scleral icterus.   Neck: Normal range of motion. Neck supple.   Cardiovascular: Normal rate, regular rhythm and intact distal pulses.   No murmur heard.  Pulmonary/Chest: Breath sounds normal. No stridor. No respiratory distress. He has no wheezes. He has no rhonchi. He exhibits no tenderness.   Abdominal: Soft. Bowel sounds are normal. There is no tenderness.   Genitourinary: Right testis shows no swelling. Left testis shows no swelling.         Musculoskeletal: Normal range of motion. He exhibits no edema.   Neurological: He is alert and oriented to person, place, and time. He has normal strength.   Skin: Skin is warm. Rash noted. No abscess noted. No pallor.        Psychiatric: He has a normal mood and affect.          ED Course   Procedures  Labs Reviewed - No data to display          Medical Decision Making:   ED Management:  No clinical signs of infections                     ED Course     Labs Reviewed  No visits with results within 1 Day(s) from this visit.  Latest known visit with results is:    Procedure visit on 03/10/2017   Component Date Value Ref Range Status    Color, UA 03/10/2017 yel   Final    Spec Grav UA 03/10/2017 1.020   Final    pH, UA 03/10/2017 5   Final    WBC, UA 03/10/2017 n   Final    Nitrite, UA 03/10/2017 n   Final    Protein 03/10/2017 n   Final    Glucose, UA 03/10/2017 n   Final    Ketones, UA 03/10/2017 n   Final    Urobilinogen, UA 03/10/2017 n   Final    Bilirubin 03/10/2017 n   Final    Blood, UA 03/10/2017 n   Final        Imaging Reviewed    Imaging Results     None          Medications given in ED    Medications - No data to display    Discharge Medications     Medication List with Changes/Refills   New Medications    CLOTRIMAZOLE (LOTRIMIN) 1 % CREAM    Apply to affected area 2 times daily    FLUOCINONIDE (LIDEX) 0.05 % OINTMENT    Apply topically 2 (two) times daily. DO NOT USE ON FACE    MUPIROCIN (BACTROBAN) 2 % OINTMENT    Apply topically 2 (two) times daily.   Current Medications    BACLOFEN (LIORESAL) 20 MG TABLET    Take 1 tablet (20 mg total) by mouth 4 (four) times daily.    DOCUSATE SODIUM (COLACE) 100 MG CAPSULE    Take 1 capsule (100 mg total) by mouth 2 (two) times daily as needed for Constipation.    DULOXETINE (CYMBALTA) 60 MG CAPSULE    Take 1 capsule (60 mg total) by mouth once daily.    OXYCODONE-ACETAMINOPHEN (PERCOCET)  MG PER TABLET    Take 1 tablet by mouth daily as needed for Pain.    TAMSULOSIN (FLOMAX) 0.4 MG CP24    TAKE 1 CAPSULE (0.4 MG TOTAL) BY MOUTH ONCE DAILY.             Patient discharged to home in stable condition with instructions to:   1. Please take all meds as prescribed.  2. Follow-up with your primary care doctor   3. Return  precautions discussed and patient and/or family/caretaker understands to return to the emergency room for any concerns including worsening of your current symptoms, fever, chills, night sweats, worsening pain, chest pain, shortness of breath, nausea, vomiting, diarrhea, bleeding, headache, difficulty talking, visual disturbances, weakness, numbness or any other acute concerns    Clinical Impression:   The primary encounter diagnosis was Tinea cruris. A diagnosis of Tinea pedis of right foot was also pertinent to this visit.          Marilia Bartlett MD  04/20/17 [id number]

## 2017-04-21 NOTE — ED NOTES
Pt reports itching and irritation to his right foot for the past several days states he has been using OTC meds without relief

## 2017-05-09 ENCOUNTER — OFFICE VISIT (OUTPATIENT)
Dept: PHYSICAL MEDICINE AND REHAB | Facility: CLINIC | Age: 67
End: 2017-05-09
Payer: COMMERCIAL

## 2017-05-09 VITALS
DIASTOLIC BLOOD PRESSURE: 78 MMHG | WEIGHT: 240 LBS | BODY MASS INDEX: 37.67 KG/M2 | HEIGHT: 67 IN | SYSTOLIC BLOOD PRESSURE: 138 MMHG | HEART RATE: 86 BPM

## 2017-05-09 DIAGNOSIS — M47.26 OSTEOARTHRITIS OF SPINE WITH RADICULOPATHY, LUMBAR REGION: Primary | ICD-10-CM

## 2017-05-09 DIAGNOSIS — G95.9 CERVICAL MYELOPATHY: ICD-10-CM

## 2017-05-09 PROCEDURE — 3075F SYST BP GE 130 - 139MM HG: CPT | Mod: S$GLB,,, | Performed by: PHYSICAL MEDICINE & REHABILITATION

## 2017-05-09 PROCEDURE — 1125F AMNT PAIN NOTED PAIN PRSNT: CPT | Mod: S$GLB,,, | Performed by: PHYSICAL MEDICINE & REHABILITATION

## 2017-05-09 PROCEDURE — 99214 OFFICE O/P EST MOD 30 MIN: CPT | Mod: S$GLB,,, | Performed by: PHYSICAL MEDICINE & REHABILITATION

## 2017-05-09 PROCEDURE — 1160F RVW MEDS BY RX/DR IN RCRD: CPT | Mod: S$GLB,,, | Performed by: PHYSICAL MEDICINE & REHABILITATION

## 2017-05-09 PROCEDURE — 99999 PR PBB SHADOW E&M-EST. PATIENT-LVL III: CPT | Mod: PBBFAC,,, | Performed by: PHYSICAL MEDICINE & REHABILITATION

## 2017-05-09 PROCEDURE — 1157F ADVNC CARE PLAN IN RCRD: CPT | Mod: 8P,S$GLB,, | Performed by: PHYSICAL MEDICINE & REHABILITATION

## 2017-05-09 PROCEDURE — 3078F DIAST BP <80 MM HG: CPT | Mod: S$GLB,,, | Performed by: PHYSICAL MEDICINE & REHABILITATION

## 2017-05-09 PROCEDURE — 1159F MED LIST DOCD IN RCRD: CPT | Mod: S$GLB,,, | Performed by: PHYSICAL MEDICINE & REHABILITATION

## 2017-05-09 RX ORDER — OXYCODONE AND ACETAMINOPHEN 10; 325 MG/1; MG/1
1 TABLET ORAL 2 TIMES DAILY PRN
Qty: 60 TABLET | Refills: 0 | Status: SHIPPED | OUTPATIENT
Start: 2017-05-09 | End: 2017-08-25

## 2017-05-09 NOTE — PROGRESS NOTES
"Subjective:       Patient ID: Bryan Queen is a 66 y.o. male.    Chief Complaint: No chief complaint on file.    HPI Comments: This 67yo BM is followed for:    -- LBP radiating to BLE to post thighs (burning) and to the legs where it feels like "hot water inside" and assoc with feet feeling swollen.  Stts the RLE is "giving out" at times -- assoc with right medial knee pain "like someone is sticking me with something".  Worst time of the day is in the mornings getting OOB and for first 45 mins of the day.  Also pain with going from sitting to standing and with walking distances.  He has spasms of the BLEs which have been controlled with Baclofen 20mg TID.      The pt stts the LBP and post thigh pain occurs in the AM as above and with standing/walking but the lower leg pain occurs more with sitting.  He had an MRI done 6/26/14 which showed:    L2-L3 minimal -- mild posterior disk bulge asymmetric to the left, facet arthropathy, mild left foraminal narrowing.    L3-L4 moderate - disk bulge with moderate facet dz causing mild stenosis and moderate left foraminal stenosis.      L4-L5  mild posterior bulge, status post laminectomy surgery, severe facet dz with severe  spinal stenosis and moderate right/severe left foraminal stenosis.    At L5-S1 moderate posterior disk bulge, moderate/severe facet dz with mild stenosis and moderate right/severe left foraminal stenosis.    He is taking Baclofen 20mg tid.  He took Arthrotec 75mg bid but no longer takes that due to mild CRI.  He uses Pennsaid but does not like it as much as Voltaren gel.  He is not taking tramadol which was no help.    -- BLE edema.  It was considered that this may be 2/2 gabapentin and the medicine was stopped for a time but the swelling did not improve and his pain increased.     -- gait instability 2/2 cervical myelopathy.     -- neuropathic pain of the BLEs described as burning pain.  He took gabapentin 600mg TID but c/o memory problems and it was " "d/c'd with improvement in his mentation.   He takes Cymbalta for this and for his LBP.  He was given a rx for Lyrica at the  9/16/16 clinic visit but that caused swelling and rash and was d/c'd.    He had bilateral L4 ESIs 11/5/13 which he says did help some, mostly the right side.  On 4/4/15 he had bilateral SI injxns (incomplete relief for only a few weeks) and on 5/22/15 he had bilateral L3-4 ESIs.  He is not impressed with the injections and does not want any more.          Today the pt's CC is "my back" but he stts "I'm doing pretty good".  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  He is c/o L>>R LBP which radiates to the post thighs.  He gets good relief with Percocet 10mg which he cuts in half (5mg) and takes usually once per day.  He was given #30 on 2/18/17 and still has some left.  He takes Aleve infrequently which also helps.                  Review of Systems   Cardiovascular: Positive for leg swelling.   Gastrointestinal: Negative for constipation.   Genitourinary: Negative for dysuria and frequency.   Musculoskeletal: Positive for back pain and gait problem.   Psychiatric/Behavioral: Negative for dysphoric mood.   All other systems reviewed and are negative.      Objective:      Physical Exam   Constitutional: He is oriented to person, place, and time. He appears well-nourished.   Eyes: EOM are normal. Pupils are equal, round, and reactive to light.   Neck: Neck supple.   Cardiovascular: Normal rate.    Pulmonary/Chest: Effort normal.   Musculoskeletal: Normal range of motion.   AROM of BUE and RLE are WNL.  LLE is WNL except KF is limited to about 95 degrees    There is an erythematous rash of the left lateral abdominal area.   Neurological: He is oriented to person, place, and time. He has normal strength.   Except LLE is 4+ HF and KF   Skin: Skin is intact. No rash noted.   Psychiatric: He has a " normal mood and affect.       Assessment:       1. Osteoarthritis of spine with radiculopathy, lumbar region -- doing well.  He pretty significant stenosis but is managing his pain syndrome very well and is remaining active.  I talked with him about joining a gym for swimming which may be helpful -- he will consider it but he is doing well in his present state.      He is concerned about LLE weakness but his strength grades well today.  This is from his cervical myelopathy and has actually improved over the years.      I gave him permission to taper Baclofen off (wrote instructions as he has a hard time remembering medical instructions).  He understands that he can restart it if his pain increases.  He may continue his conservative use of Percocet.  I changed his rx from 1 QD PRN (#30) to 1 BID PRN (#60) to allow a greater quantity since it may be some months before he sees a new physician.       2. Cervical myelopathy -- He is s/p C3-4 fusion in the past with residual myelopathy (has myelomalacia) and was on IPR at one time.  He has slowly progressed over the years and is doing very well.         Plan:       RTC 6 mos to see Dr. Campos, 40 mins, as I will be retiring.  More than 25 mins was spent with the patient with more than half that time used to discuss the pt's diagnoses, interventions and treatment plan.

## 2017-05-09 NOTE — MR AVS SNAPSHOT
[place] [facility]  [address]  [facility] [address]  Phone: [phone]                  Bryan Queen   2017 10:40 AM   Office Visit    Description:  Male : 1950   Provider:  Gary Stout MD   Department:  [facility]                To Do List           Future Appointments        Provider Department Dept Phone    2017 10:30 AM [facility] MRI1 [facility] [phone]    2017 10:30 AM Kieran Evans MD [facility] [phone]      Goals (5 Years of Data)     None      Follow-Up and Disposition     Return in about 6 months (around 2017) for Dr. Campos, 40 mins.       These Medications        Disp Refills Start End    oxycodone-acetaminophen (PERCOCET)  mg per tablet 60 tablet 0 2017     Take 1 tablet by mouth 2 (two) times daily as needed for Pain. - Oral    Pharmacy: [facility]/pharmacy #[id number] - [address] Ph #: [phone]         [facility]     [facility] Care Line -  Assistance  Unless otherwise directed by your provider, please contact [facility], our nurse care line that is available for  assistance.     Registered nurses in the [facility] provide: appointment scheduling, clinical advisement, health education, and other advisory services.  Call: [phone] (toll free)               Medications           Message regarding Medications     Verify the changes and/or additions to your medication regime listed below are the same as discussed with your clinician today.  If any of these changes or additions are incorrect, please notify your healthcare provider.        CHANGE how you are taking these medications     Start Taking Instead of    oxycodone-acetaminophen (PERCOCET)  mg per tablet oxycodone-acetaminophen (PERCOCET)  mg per tablet    Dosage:  Take 1 tablet by mouth 2 (two) times daily as needed for Pain. Dosage:   "Take 1 tablet by mouth daily as needed for Pain.    Reason for Change:  Reorder            Verify that the below list of medications is an accurate representation of the medications you are currently taking.  If none reported, the list may be blank. If incorrect, please contact your healthcare provider. Carry this list with you in case of emergency.           Current Medications     baclofen (LIORESAL) 20 MG tablet Take 1 tablet (20 mg total) by mouth 4 (four) times daily.    clotrimazole (LOTRIMIN) 1 % cream Apply to affected area 2 times daily    docusate sodium (COLACE) 100 MG capsule Take 1 capsule (100 mg total) by mouth 2 (two) times daily as needed for Constipation.    duloxetine (CYMBALTA) 60 MG capsule Take 1 capsule (60 mg total) by mouth once daily.    fluocinonide (LIDEX) 0.05 % ointment Apply topically 2 (two) times daily. DO NOT USE ON FACE    oxycodone-acetaminophen (PERCOCET)  mg per tablet Take 1 tablet by mouth 2 (two) times daily as needed for Pain.    tamsulosin (FLOMAX) 0.4 mg Cp24 TAKE 1 CAPSULE (0.4 MG TOTAL) BY MOUTH ONCE DAILY.           Clinical Reference Information           Your Vitals Were     BP Pulse Height Weight BMI    138/78 86 5' 7" (1.702 m) 108.9 kg (240 lb) 37.59 kg/m2      Blood Pressure          Most Recent Value    BP  138/78      Allergies as of 5/9/2017     Hydroxyzine    Lyrica [Pregabalin]    Mobic [Meloxicam]    Tramadol      Immunizations Administered on Date of Encounter - 5/9/2017     None      [facility] Sign-Up     Activating your [facility] account is as easy as 1-2-3!     1) Visit [email or web address], select Sign Up Now, enter this activation code and your date of birth, then select Next.  [email or web address]  Expires: 6/23/2017 11:04 AM      2) Create a username and password to use when you visit [facility] in the future and select a security question in case you lose your password and select Next.    3) Enter your e-mail address and click Sign Up!    Additional " Information  If you have questions, please e-mail [email or web address] or call [phone] to talk to our [facility] staff. Remember, [facility] is NOT to be used for urgent needs. For medical emergencies, dial 911.         Instructions    Cut your Baclofen pill in half (10mg) and take it twice a day for 3 days then stop it.       Language Assistance Services     ATTENTION: Language assistance services are available, free of charge. Please call [phone].      ATENCIÓN: Si habla español, tiene a norton disposición servicios gratuitos de asistencia lingüística. Llame al [phone].     JAMILA Ý: N?u b?n nói Ti?ng Vi?t, có các d?ch v? h? tr? ngôn ng? mi?n phí dành cho b?n. G?i s? [phone].         [facility] complies with applicable Federal civil rights laws and does not discriminate on the basis of race, color, national origin, age, disability, or sex.

## 2017-06-16 ENCOUNTER — HOSPITAL ENCOUNTER (OUTPATIENT)
Dept: RADIOLOGY | Facility: HOSPITAL | Age: 67
Discharge: HOME | End: 2017-06-16
Attending: UROLOGY
Payer: COMMERCIAL

## 2017-06-16 DIAGNOSIS — C61 PROSTATE CANCER: ICD-10-CM

## 2017-06-16 LAB
CREAT SERPL-MCNC: 1.2 MG/DL (ref 0.5–1.4)
SAMPLE: NORMAL

## 2017-06-16 PROCEDURE — 25500020 PHARM REV CODE 255: Performed by: UROLOGY

## 2017-06-16 PROCEDURE — 72197 MRI PELVIS W/O & W/DYE: CPT | Mod: TC

## 2017-06-16 PROCEDURE — 72197 MRI PELVIS W/O & W/DYE: CPT | Mod: 26,,, | Performed by: RADIOLOGY

## 2017-06-16 PROCEDURE — A9585 GADOBUTROL INJECTION: HCPCS | Performed by: UROLOGY

## 2017-06-16 RX ORDER — GADOBUTROL 604.72 MG/ML
10 INJECTION INTRAVENOUS
Status: COMPLETED | OUTPATIENT
Start: 2017-06-16 | End: 2017-06-16

## 2017-06-16 RX ADMIN — GADOBUTROL 10 ML: 604.72 INJECTION INTRAVENOUS at 03:06

## 2017-06-20 ENCOUNTER — OFFICE VISIT (OUTPATIENT)
Dept: UROLOGY | Facility: CLINIC | Age: 67
End: 2017-06-20
Attending: UROLOGY
Payer: COMMERCIAL

## 2017-06-20 VITALS
DIASTOLIC BLOOD PRESSURE: 78 MMHG | BODY MASS INDEX: 37.67 KG/M2 | HEART RATE: 81 BPM | SYSTOLIC BLOOD PRESSURE: 141 MMHG | HEIGHT: 67 IN | WEIGHT: 240 LBS

## 2017-06-20 DIAGNOSIS — C61 PROSTATE CANCER: Primary | ICD-10-CM

## 2017-06-20 LAB
BILIRUB SERPL-MCNC: NORMAL MG/DL
BLOOD URINE, POC: NORMAL
COLOR, POC UA: NORMAL
GLUCOSE UR QL STRIP: NORMAL
KETONES UR QL STRIP: NORMAL
LEUKOCYTE ESTERASE URINE, POC: NORMAL
NITRITE, POC UA: NORMAL
PH, POC UA: 5
PROTEIN, POC: NORMAL
SPECIFIC GRAVITY, POC UA: 1
UROBILINOGEN, POC UA: NORMAL

## 2017-06-20 PROCEDURE — 99999 PR PBB SHADOW E&M-EST. PATIENT-LVL III: CPT | Mod: PBBFAC,,, | Performed by: UROLOGY

## 2017-06-20 PROCEDURE — 1159F MED LIST DOCD IN RCRD: CPT | Mod: S$GLB,,, | Performed by: UROLOGY

## 2017-06-20 PROCEDURE — 81002 URINALYSIS NONAUTO W/O SCOPE: CPT | Mod: S$GLB,,, | Performed by: UROLOGY

## 2017-06-20 PROCEDURE — 99214 OFFICE O/P EST MOD 30 MIN: CPT | Mod: 25,S$GLB,, | Performed by: UROLOGY

## 2017-06-20 PROCEDURE — 1126F AMNT PAIN NOTED NONE PRSNT: CPT | Mod: S$GLB,,, | Performed by: UROLOGY

## 2017-06-20 NOTE — PROGRESS NOTES
25 min face to face; more than 50% time spent counseling and coordinating care  MRI shows no gross evidence of extraprostatic disease and no adenopathy.  Mr. Queen and I discussed again all the treatment options for his prostate cancer.  His case was discussed at  oncology conference also.  Based on his multiple risk factors and a component of high-grade disease I think active treatment is a better course than active surveillance.  We discussed radiation and nonsurgical treatments in detail and I offered to refer him to radiation oncology.  At this point in time he is leaning towards surgery.  He would like to return with his wife Friday to review his options in more detail but he most likely will schedule surgery for July 5

## 2017-06-23 ENCOUNTER — OFFICE VISIT (OUTPATIENT)
Dept: UROLOGY | Facility: CLINIC | Age: 67
End: 2017-06-23
Attending: UROLOGY
Payer: COMMERCIAL

## 2017-06-23 ENCOUNTER — RESEARCH ENCOUNTER (OUTPATIENT)
Dept: UROLOGY | Facility: CLINIC | Age: 67
End: 2017-06-23

## 2017-06-23 VITALS
DIASTOLIC BLOOD PRESSURE: 69 MMHG | BODY MASS INDEX: 37.67 KG/M2 | SYSTOLIC BLOOD PRESSURE: 129 MMHG | HEIGHT: 67 IN | HEART RATE: 85 BPM | WEIGHT: 240 LBS

## 2017-06-23 DIAGNOSIS — C61 PROSTATE CANCER: Primary | ICD-10-CM

## 2017-06-23 DIAGNOSIS — C61 MALIGNANT NEOPLASM OF PROSTATE: ICD-10-CM

## 2017-06-23 PROCEDURE — 1159F MED LIST DOCD IN RCRD: CPT | Mod: S$GLB,,, | Performed by: UROLOGY

## 2017-06-23 PROCEDURE — 99999 PR PBB SHADOW E&M-EST. PATIENT-LVL IV: CPT | Mod: PBBFAC,,, | Performed by: UROLOGY

## 2017-06-23 PROCEDURE — 1126F AMNT PAIN NOTED NONE PRSNT: CPT | Mod: S$GLB,,, | Performed by: UROLOGY

## 2017-06-23 PROCEDURE — 81002 URINALYSIS NONAUTO W/O SCOPE: CPT | Mod: S$GLB,,, | Performed by: UROLOGY

## 2017-06-23 PROCEDURE — 99215 OFFICE O/P EST HI 40 MIN: CPT | Mod: S$GLB,,, | Performed by: UROLOGY

## 2017-06-23 NOTE — H&P
Subjective:       Patient ID: Bryan Queen is a 66 y.o. male.    Chief Complaint: Follow-up    The patient has chosen to proceed with robotic surgery.  He presents with his wife for preop appointment.  No significant lower urinary tract symptoms except nocturia.  No significant erectile dysfunction.  No previous abdominal surgeries    Past Medical History:   Diagnosis Date    Arthritis     BPH (benign prostatic hypertrophy)     Colon polyps     Elevated PSA     Groin abscess     Lumbar radiculopathy     Obstructive sleep apnea (adult) (pediatric)     CPAP hs    PONV (postoperative nausea and vomiting)     Spinal stenosis      Past Surgical History:   Procedure Laterality Date    CERVICAL FUSION  1/27/2009    C3-4 fusion    COLONOSCOPY N/A 9/13/2016    Procedure: COLONOSCOPY;  Surgeon: ROWENA Ahn MD;  Location: [facility] ([address] [place] [address]);  Service: Endoscopy;  Laterality: N/A;  Patient requested the week of 9/12. Patient reports PONV.    KNEE SURGERY Left 4-13-15    TK    KNEE SURGERY Right 8-19-15    TK    LUMBAR DISCECTOMY  12/3/2009    L5-S1 microdiscex    SPINE SURGERY  04/01/13    L4/5 laminectomy    TONSILLECTOMY       Family History   Problem Relation Age of Onset    Diabetes Mother     Hypertension Mother     Diabetes Sister     Diabetes Brother      Social History     Social History    Marital status:      Spouse name: N/A    Number of children: N/A    Years of education: N/A     Social History Main Topics    Smoking status: Never Smoker    Smokeless tobacco: Never Used    Alcohol use Yes      Comment: occasional    Drug use: No    Sexual activity: Yes     Partners: Female     Other Topics Concern    None     Social History Narrative    None       Current Outpatient Prescriptions   Medication Sig Dispense Refill    clotrimazole (LOTRIMIN) 1 % cream Apply to affected area 2 times daily 15 g 0    docusate sodium (COLACE) 100 MG capsule Take 1 capsule (100 mg total) by  "mouth 2 (two) times daily as needed for Constipation. 60 capsule 1    duloxetine (CYMBALTA) 60 MG capsule Take 1 capsule (60 mg total) by mouth once daily. 90 capsule 3    oxycodone-acetaminophen (PERCOCET)  mg per tablet Take 1 tablet by mouth 2 (two) times daily as needed for Pain. 60 tablet 0    tamsulosin (FLOMAX) 0.4 mg Cp24 TAKE 1 CAPSULE (0.4 MG TOTAL) BY MOUTH ONCE DAILY. 90 capsule 1    fluocinonide (LIDEX) 0.05 % ointment Apply topically 2 (two) times daily. DO NOT USE ON FACE 60 g 0     No current facility-administered medications for this visit.      Review of patient's allergies indicates:   Allergen Reactions    Hydroxyzine Nausea And Vomiting and Other (See Comments)     SWEATING    Lyrica [pregabalin] Rash    Mobic [meloxicam] Nausea And Vomiting     Tinnitus    Tramadol Nausea And Vomiting     Nauseous,Sweating, Ringing in the ears       Review of Systems    Objective:      Vitals:    06/23/17 1543   BP: 129/69   BP Location: Right arm   Patient Position: Sitting   BP Method: Automatic   Pulse: 85   Weight: 108.9 kg (240 lb)   Height: 5' 7" (1.702 m)     Physical Exam    Ao*4  resp normal rate' breathing not labored  cv normal rate  Ab nondistended  Ext no edema  Previous prostate examination no nodules    Lab Review:   CBC:   Lab Results   Component Value Date    WBC 9.60 08/20/2015    RBC 3.93 (L) 08/20/2015    HGB 11.5 (L) 08/20/2015    HCT 35.6 (L) 08/20/2015     08/20/2015     BMP:   Lab Results   Component Value Date    GLU 90 12/21/2016     12/21/2016    K 4.9 12/21/2016     12/21/2016    CO2 28 12/21/2016    BUN 20 12/21/2016    CREATININE 1.3 12/21/2016    CALCIUM 9.0 12/21/2016     Lab Results   Component Value Date    PSA 4.6 (H) 12/21/2016    PSA 1.29 02/27/2013    PSA 1.1 07/01/2011    PSADIAG 4.6 (H) 03/03/2017     FINAL PATHOLOGIC DIAGNOSIS  PROSTATE BIOPSIES 1, 3, 4, 5, AND 6:  NO CARCINOMA IDENTIFIED  2. BIOPSIES OF LEFT MID PROSTATE:  ADENOCARCINOMA " TOTAL ZACK SCORE 6 (3+3)--SEE DESCRIPTION  ASR  Diagnosed by: James Friedman M.D.  (Electronically Signed: 2017-03-15 08:36:08)  Microscopic Examination  2. A microscopic focus of carcinoma involves under 5% of this specimen. In this region there are glands with a  smaller than usual caliber, somewhat erratically infiltrating. There is slight nuclear enlargement with a few visible  nucleoli. These glands lack a basal layer with the p63 and high molecular weight cytokeratin stains. There is some  AMACR positivity in this region, and some other glands in the specimen have at least some positivity. The positive and  negative controls stained appropriately. The pattern of the carcinoma is interpreted as [place] grade 3. A few foci  suggest the possibility of early fused glands, but this picture may be the result of some tangent    Diagnostics Review:   TRUS 19.2 g     Assessment:       1. Prostate cancer    2. Malignant neoplasm of prostate        Plan:       Robotic prostatectomy +/- lymphadenectomy July 6       Mr. Queen and his wife and I discussed again all the treatment options for his prostate cancer.  His case was discussed at  oncology conference also.  Based on his multiple risk factors and a component of high-grade disease I think active treatment is a better course than active surveillance.

## 2017-06-27 ENCOUNTER — TELEPHONE (OUTPATIENT)
Dept: UROLOGY | Facility: CLINIC | Age: 67
End: 2017-06-27

## 2017-06-27 NOTE — TELEPHONE ENCOUNTER
Please call Terri Queen, wife.  Pt will not be here on his surgery date.  We need to coordinate another day with him

## 2017-06-27 NOTE — TELEPHONE ENCOUNTER
----- Message from Mary Lara sent at 6/27/2017  9:24 AM CDT -----  x_  1st Request  _  2nd Request  _  3rd Request        Who: Spouse    Why: Called and stated that someone called to change the surgery date and she need to discuss that with someone. Please call her.     What Number to Call Back: [phone]    When to Expect a call back: (Before the end of the day)   -- if the call is after 12:00, the call back will be tomorrow.

## 2017-06-27 NOTE — PROGRESS NOTES
Pt was approached by Dr. Evans in clinic regarding participation in [employer] program (IRB#[id number]). Pt was agreeable.   The ICF was reviewed with pt. The discussion included:   - participation is voluntary;   - pt can change his mind about participating at any time;   - if he changes his mind about participation, he can call us at contact info in ICF and we will discard samples remaining;   - samples that have been used prior to his notification will still be included in research;   - specimens collected will include blood, urine and tissue;   - blood and urine will be collected pre-operatively if possible;   - tissue will be collected from Pathology after routine tests have been conducted;   - Dr. Evans will perform procedure per usual protocol - participation in [employer] program will not change amount of tissue removed;   - specimens will be stored with unique code that can only be linked to pt by [employer] staff;   - all medical information released to researchers will be stripped of identifiers;   - samples will not be released to outside researchers unless approved by internal committee;   - there is a small risk of loss of confidentiality, but we make every effort to ensure privacy;   - no other physical risks outside of those involved in standard of care procedure.     Pt did not have any questions. Pt willingly and independently signed ICF for [facility]. Pt was consented by Dr. Evans.

## 2017-06-28 NOTE — TELEPHONE ENCOUNTER
Spoke to patient  wife.  Procedure reschedled to 8/18.  Patient is unable to accept other offered date due to his daughter getting  on 8/5

## 2017-07-07 RX ORDER — DULOXETIN HYDROCHLORIDE 60 MG/1
60 CAPSULE, DELAYED RELEASE ORAL DAILY
Qty: 90 CAPSULE | Refills: 3 | Status: SHIPPED | OUTPATIENT
Start: 2017-07-07 | End: 2018-05-18 | Stop reason: SDUPTHER

## 2017-08-08 ENCOUNTER — HOSPITAL ENCOUNTER (OUTPATIENT)
Dept: PREADMISSION TESTING | Facility: OTHER | Age: 67
Discharge: HOME | End: 2017-08-08
Attending: UROLOGY
Payer: COMMERCIAL

## 2017-08-08 ENCOUNTER — ANESTHESIA EVENT (OUTPATIENT)
Dept: SURGERY | Facility: OTHER | Age: 67
DRG: 708 | End: 2017-08-08
Payer: COMMERCIAL

## 2017-08-08 VITALS
SYSTOLIC BLOOD PRESSURE: 136 MMHG | HEIGHT: 67 IN | WEIGHT: 240 LBS | DIASTOLIC BLOOD PRESSURE: 79 MMHG | TEMPERATURE: 98 F | BODY MASS INDEX: 37.67 KG/M2 | OXYGEN SATURATION: 96 % | HEART RATE: 76 BPM

## 2017-08-08 DIAGNOSIS — C61 PROSTATE CANCER: ICD-10-CM

## 2017-08-08 LAB
ABO + RH BLD: NORMAL
ANION GAP SERPL CALC-SCNC: 7 MMOL/L
BASOPHILS # BLD AUTO: 0.08 K/UL
BASOPHILS NFR BLD: 1.4 %
BLD GP AB SCN CELLS X3 SERPL QL: NORMAL
BUN SERPL-MCNC: 22 MG/DL
CALCIUM SERPL-MCNC: 9.1 MG/DL
CHLORIDE SERPL-SCNC: 106 MMOL/L
CO2 SERPL-SCNC: 26 MMOL/L
CREAT SERPL-MCNC: 1.2 MG/DL
DIFFERENTIAL METHOD: ABNORMAL
EOSINOPHIL # BLD AUTO: 0.2 K/UL
EOSINOPHIL NFR BLD: 3.6 %
ERYTHROCYTE [DISTWIDTH] IN BLOOD BY AUTOMATED COUNT: 13.8 %
EST. GFR  (AFRICAN AMERICAN): >60 ML/MIN/1.73 M^2
EST. GFR  (NON AFRICAN AMERICAN): >60 ML/MIN/1.73 M^2
GLUCOSE SERPL-MCNC: 130 MG/DL
HCT VFR BLD AUTO: 41.9 %
HGB BLD-MCNC: 13.4 G/DL
LYMPHOCYTES # BLD AUTO: 2.7 K/UL
LYMPHOCYTES NFR BLD: 46.3 %
MCH RBC QN AUTO: 29.5 PG
MCHC RBC AUTO-ENTMCNC: 32 G/DL
MCV RBC AUTO: 92 FL
MONOCYTES # BLD AUTO: 0.5 K/UL
MONOCYTES NFR BLD: 7.7 %
NEUTROPHILS # BLD AUTO: 2.4 K/UL
NEUTROPHILS NFR BLD: 40.7 %
PLATELET # BLD AUTO: 287 K/UL
PMV BLD AUTO: 9.8 FL
POTASSIUM SERPL-SCNC: 4.2 MMOL/L
RBC # BLD AUTO: 4.54 M/UL
SODIUM SERPL-SCNC: 139 MMOL/L
WBC # BLD AUTO: 5.81 K/UL

## 2017-08-08 PROCEDURE — 86850 RBC ANTIBODY SCREEN: CPT

## 2017-08-08 PROCEDURE — 86900 BLOOD TYPING SEROLOGIC ABO: CPT

## 2017-08-08 PROCEDURE — 80048 BASIC METABOLIC PNL TOTAL CA: CPT

## 2017-08-08 PROCEDURE — 85025 COMPLETE CBC W/AUTO DIFF WBC: CPT

## 2017-08-08 PROCEDURE — 93005 ELECTROCARDIOGRAM TRACING: CPT

## 2017-08-08 RX ORDER — SODIUM CHLORIDE, SODIUM LACTATE, POTASSIUM CHLORIDE, CALCIUM CHLORIDE 600; 310; 30; 20 MG/100ML; MG/100ML; MG/100ML; MG/100ML
INJECTION, SOLUTION INTRAVENOUS CONTINUOUS
Status: CANCELLED | OUTPATIENT
Start: 2017-08-08

## 2017-08-08 RX ORDER — SCOLOPAMINE TRANSDERMAL SYSTEM 1 MG/1
1 PATCH, EXTENDED RELEASE TRANSDERMAL
Status: CANCELLED | OUTPATIENT
Start: 2017-08-08

## 2017-08-08 RX ORDER — NAPROXEN SODIUM 220 MG
220 TABLET ORAL
COMMUNITY
End: 2017-09-28 | Stop reason: HOSPADM

## 2017-08-08 NOTE — DISCHARGE INSTRUCTIONS
PRE-ADMIT TESTING -  [phone]    [address]  [facility]        OUTPATIENT SURGERY UNIT - [phone]    Your surgery has been scheduled at [facility]. We are pleased to have the opportunity to serve you. For Further Information please call [phone].    On the day of surgery please report to the Information Desk on the 1st floor.    · CONTACT YOUR PHYSICIAN'S OFFICE THE DAY PRIOR TO YOUR SURGERY TO OBTAIN YOUR ARRIVAL TIME.     · The evening before surgery do not eat anything after 9 p.m. ( this includes hard candy, chewing gum and mints).  You may only have GATORADE, POWERADE AND WATER  from 9 p.m. until you leave your home.   DO NOT DRINK ANY LIQUIDS ON THE WAY TO THE HOSPITAL.      SPECIAL MEDICATION INSTRUCTIONS: TAKE medications checked off by the Anesthesiologist on your Medication List.    Angiogram Patients: Take medications as instructed by your physician, including aspirin.     Surgery Patients:    If you take ASPIRIN - Your PHYSICIAN/SURGEON will need to inform you IF/OR when you need to stop taking aspirin prior to your surgery.     Do Not take any medications containing IBUPROFEN.  Do Not Wear any make-up or dark nail polish   (especially eye make-up) to surgery. If you come to surgery with makeup on you will be required to remove the makeup or nail polish.    Do not shave your surgical area at least 5 days prior to your surgery. The surgical prep will be performed at the hospital according to Infection Control regulations.    Leave all valuables at home.   Do Not wear any jewelry or watches, including any metal in body piercings.  Contact Lens must be removed before surgery. Either do not wear the contact lens or bring a case and solution for storage.  Please bring a container for eyeglasses or dentures as required.  Bring any paperwork your physician has provided, such as consent forms,  history and physicals, doctor's orders, etc.   Bring comfortable clothes  that are loose fitting to wear upon discharge. Take into consideration the type of surgery being performed.  Maintain your diet as advised per your physician the day prior to surgery.      Adequate rest the night before surgery is advised.   Park in the Parking lot behind the hospital or in the [facility] across the street from the parking lot. Parking is complimentary.  If you will be discharged the same day as your procedure, please arrange for a responsible adult to drive you home or to accompany you if traveling by taxi.   YOU WILL NOT BE PERMITTED TO DRIVE OR TO LEAVE THE HOSPITAL ALONE AFTER SURGERY.   It is strongly recommended that you arrange for someone to remain with you for the first 24 hrs following your surgery.       Thank you for your cooperation.  The Staff of [facility].        Bathing Instructions                                                                 Please shower the evening before and morning of your procedure with    ANTIBACTERIAL SOAP. ( DIAL, etc )  Concentrate on the surgical area   for at least 3 minutes and rinse completely. Dry off as usual.   Do not use any deodorant, powder, body lotions, perfume, after shave or    cologne.

## 2017-08-08 NOTE — ANESTHESIA PREPROCEDURE EVALUATION
08/08/2017  Bryan Queen is a 66 y.o., male.    Anesthesia Evaluation    I have reviewed the Patient Summary Reports.    I have reviewed the Nursing Notes.   I have reviewed the Medications.     Review of Systems  Anesthesia Hx:  No problems with previous Anesthesia  Denies Family Hx of Anesthesia complications.  Personal Hx of Anesthesia complications, Post-Operative Nausea/Vomiting   Social:  Non-Smoker    Hematology/Oncology:  Hematology Normal      Current/Recent Cancer.   EENT/Dental:EENT/Dental Normal   Cardiovascular:   Exercise tolerance: good Hypertension, well controlled    Pulmonary:   Sleep Apnea, CPAP    Renal/:   Chronic Renal Disease    Musculoskeletal:   Arthritis  S/p acf--doing well Spine Disorders: lumbar and cervical Chronic Pain and Degenerative disease    Neurological:  Neurology Normal    Endocrine:  Endocrine Normal    Dermatological:  Skin Normal    Psych:  Psychiatric Normal           Physical Exam  General:  Obesity    Airway/Jaw/Neck:  Airway Findings: Mouth Opening: Normal Tongue: Normal  General Airway Assessment: Adult  Mallampati: I  TM Distance: Normal, at least 6 cm  Jaw/Neck Findings:  Neck ROM: Normal ROM      Dental:  Dental Findings: In tact              Anesthesia Plan  Type of Anesthesia, risks & benefits discussed:  Anesthesia Type:  general  Patient's Preference:   Intra-op Monitoring Plan: standard ASA monitors  Intra-op Monitoring Plan Comments:   Post Op Pain Control Plan:   Post Op Pain Control Plan Comments:   Induction:   IV  Beta Blocker:         Informed Consent: Patient understands risks and agrees with Anesthesia plan.  Questions answered. Anesthesia consent signed with patient.  ASA Score: 3     Day of Surgery Review of History & Physical:    H&P update referred to the surgeon.         Ready For Surgery From Anesthesia Perspective.

## 2017-08-09 NOTE — PLAN OF CARE
08/09/17 1602   ED Admissions Case Approval   ED Admissions Case Approval (!) CM Approved  (IP)

## 2017-08-15 ENCOUNTER — TELEPHONE (OUTPATIENT)
Dept: UROLOGY | Facility: CLINIC | Age: 67
End: 2017-08-15

## 2017-08-15 NOTE — TELEPHONE ENCOUNTER
Called pt  Left message that he will see him in the morning prior to the surgery and will have an opportunity to ask any questions at that time

## 2017-08-16 ENCOUNTER — HOSPITAL ENCOUNTER (INPATIENT)
Facility: OTHER | Age: 67
LOS: 1 days | Discharge: HOME | DRG: 708 | End: 2017-08-17
Attending: UROLOGY | Admitting: UROLOGY
Payer: COMMERCIAL

## 2017-08-16 ENCOUNTER — ANESTHESIA (OUTPATIENT)
Dept: SURGERY | Facility: OTHER | Age: 67
DRG: 708 | End: 2017-08-16
Payer: COMMERCIAL

## 2017-08-16 ENCOUNTER — SURGERY (OUTPATIENT)
Age: 67
End: 2017-08-16

## 2017-08-16 DIAGNOSIS — C61 PROSTATE CANCER: ICD-10-CM

## 2017-08-16 DIAGNOSIS — Z01.818 PREOP EXAMINATION: ICD-10-CM

## 2017-08-16 DIAGNOSIS — C61 MALIGNANT NEOPLASM OF PROSTATE: ICD-10-CM

## 2017-08-16 PROCEDURE — 25000003 PHARM REV CODE 250: Performed by: NURSE ANESTHETIST, CERTIFIED REGISTERED

## 2017-08-16 PROCEDURE — C9290 INJ, BUPIVACAINE LIPOSOME: HCPCS | Performed by: UROLOGY

## 2017-08-16 PROCEDURE — 0VT04ZZ RESECTION OF PROSTATE, PERCUTANEOUS ENDOSCOPIC APPROACH: ICD-10-PCS | Performed by: UROLOGY

## 2017-08-16 PROCEDURE — 36000712 HC OR TIME LEV V 1ST 15 MIN: Performed by: UROLOGY

## 2017-08-16 PROCEDURE — 25000003 PHARM REV CODE 250: Performed by: ANESTHESIOLOGY

## 2017-08-16 PROCEDURE — 63600175 PHARM REV CODE 636 W HCPCS: Performed by: UROLOGY

## 2017-08-16 PROCEDURE — 71000039 HC RECOVERY, EACH ADD'L HOUR: Performed by: UROLOGY

## 2017-08-16 PROCEDURE — 27201423 OPTIME MED/SURG SUP & DEVICES STERILE SUPPLY: Performed by: UROLOGY

## 2017-08-16 PROCEDURE — P9045 ALBUMIN (HUMAN), 5%, 250 ML: HCPCS | Performed by: NURSE ANESTHETIST, CERTIFIED REGISTERED

## 2017-08-16 PROCEDURE — 63600175 PHARM REV CODE 636 W HCPCS: Performed by: NURSE ANESTHETIST, CERTIFIED REGISTERED

## 2017-08-16 PROCEDURE — 55866 LAPS SURG PRST8ECT RPBIC RAD: CPT | Mod: ,,, | Performed by: UROLOGY

## 2017-08-16 PROCEDURE — 37000009 HC ANESTHESIA EA ADD 15 MINS: Performed by: UROLOGY

## 2017-08-16 PROCEDURE — 27000221 HC OXYGEN, UP TO 24 HOURS

## 2017-08-16 PROCEDURE — 36000713 HC OR TIME LEV V EA ADD 15 MIN: Performed by: UROLOGY

## 2017-08-16 PROCEDURE — 25000003 PHARM REV CODE 250: Performed by: UROLOGY

## 2017-08-16 PROCEDURE — 11000001 HC ACUTE MED/SURG PRIVATE ROOM

## 2017-08-16 PROCEDURE — 88309 TISSUE EXAM BY PATHOLOGIST: CPT | Mod: 26,,, | Performed by: PATHOLOGY

## 2017-08-16 PROCEDURE — 99900035 HC TECH TIME PER 15 MIN (STAT)

## 2017-08-16 PROCEDURE — 71000033 HC RECOVERY, INTIAL HOUR: Performed by: UROLOGY

## 2017-08-16 PROCEDURE — 88305 TISSUE EXAM BY PATHOLOGIST: CPT | Performed by: PATHOLOGY

## 2017-08-16 PROCEDURE — 8E0W4CZ ROBOTIC ASSISTED PROCEDURE OF TRUNK REGION, PERCUTANEOUS ENDOSCOPIC APPROACH: ICD-10-PCS | Performed by: UROLOGY

## 2017-08-16 PROCEDURE — 88305 TISSUE EXAM BY PATHOLOGIST: CPT | Mod: 26,,, | Performed by: PATHOLOGY

## 2017-08-16 PROCEDURE — 37000008 HC ANESTHESIA 1ST 15 MINUTES: Performed by: UROLOGY

## 2017-08-16 RX ORDER — ACETAMINOPHEN 10 MG/ML
INJECTION, SOLUTION INTRAVENOUS
Status: DISCONTINUED | OUTPATIENT
Start: 2017-08-16 | End: 2017-08-16

## 2017-08-16 RX ORDER — ONDANSETRON HYDROCHLORIDE 2 MG/ML
INJECTION, SOLUTION INTRAMUSCULAR; INTRAVENOUS
Status: DISCONTINUED | OUTPATIENT
Start: 2017-08-16 | End: 2017-08-16

## 2017-08-16 RX ORDER — SODIUM CHLORIDE, SODIUM LACTATE, POTASSIUM CHLORIDE, CALCIUM CHLORIDE 600; 310; 30; 20 MG/100ML; MG/100ML; MG/100ML; MG/100ML
INJECTION, SOLUTION INTRAVENOUS CONTINUOUS
Status: DISCONTINUED | OUTPATIENT
Start: 2017-08-16 | End: 2017-08-16

## 2017-08-16 RX ORDER — FAMOTIDINE 20 MG/1
20 TABLET, FILM COATED ORAL 2 TIMES DAILY
Status: DISCONTINUED | OUTPATIENT
Start: 2017-08-16 | End: 2017-08-17 | Stop reason: HOSPADM

## 2017-08-16 RX ORDER — PROPOFOL 10 MG/ML
VIAL (ML) INTRAVENOUS
Status: DISCONTINUED | OUTPATIENT
Start: 2017-08-16 | End: 2017-08-16

## 2017-08-16 RX ORDER — ROCURONIUM BROMIDE 10 MG/ML
INJECTION, SOLUTION INTRAVENOUS
Status: DISCONTINUED | OUTPATIENT
Start: 2017-08-16 | End: 2017-08-16

## 2017-08-16 RX ORDER — DEXAMETHASONE SODIUM PHOSPHATE 4 MG/ML
INJECTION, SOLUTION INTRA-ARTICULAR; INTRALESIONAL; INTRAMUSCULAR; INTRAVENOUS; SOFT TISSUE
Status: DISCONTINUED | OUTPATIENT
Start: 2017-08-16 | End: 2017-08-16

## 2017-08-16 RX ORDER — SCOLOPAMINE TRANSDERMAL SYSTEM 1 MG/1
1 PATCH, EXTENDED RELEASE TRANSDERMAL
Status: DISCONTINUED | OUTPATIENT
Start: 2017-08-16 | End: 2017-08-16

## 2017-08-16 RX ORDER — FENTANYL CITRATE 50 UG/ML
25 INJECTION, SOLUTION INTRAMUSCULAR; INTRAVENOUS EVERY 5 MIN PRN
Status: DISCONTINUED | OUTPATIENT
Start: 2017-08-16 | End: 2017-08-16

## 2017-08-16 RX ORDER — NEOSTIGMINE METHYLSULFATE 1 MG/ML
INJECTION, SOLUTION INTRAVENOUS
Status: DISCONTINUED | OUTPATIENT
Start: 2017-08-16 | End: 2017-08-16

## 2017-08-16 RX ORDER — CEFAZOLIN SODIUM 2 G/50ML
2 SOLUTION INTRAVENOUS
Status: COMPLETED | OUTPATIENT
Start: 2017-08-16 | End: 2017-08-16

## 2017-08-16 RX ORDER — FENTANYL CITRATE 50 UG/ML
INJECTION, SOLUTION INTRAMUSCULAR; INTRAVENOUS
Status: DISCONTINUED | OUTPATIENT
Start: 2017-08-16 | End: 2017-08-16

## 2017-08-16 RX ORDER — CEFAZOLIN SODIUM 2 G/50ML
2 SOLUTION INTRAVENOUS
Status: COMPLETED | OUTPATIENT
Start: 2017-08-16 | End: 2017-08-17

## 2017-08-16 RX ORDER — LIDOCAINE HCL/PF 100 MG/5ML
SYRINGE (ML) INTRAVENOUS
Status: DISCONTINUED | OUTPATIENT
Start: 2017-08-16 | End: 2017-08-16

## 2017-08-16 RX ORDER — OXYCODONE HYDROCHLORIDE 5 MG/1
5 TABLET ORAL
Status: DISCONTINUED | OUTPATIENT
Start: 2017-08-16 | End: 2017-08-16

## 2017-08-16 RX ORDER — ACETAMINOPHEN 10 MG/ML
1000 INJECTION, SOLUTION INTRAVENOUS EVERY 8 HOURS
Status: COMPLETED | OUTPATIENT
Start: 2017-08-16 | End: 2017-08-17

## 2017-08-16 RX ORDER — OXYCODONE HYDROCHLORIDE 5 MG/1
5 TABLET ORAL EVERY 4 HOURS PRN
Status: DISCONTINUED | OUTPATIENT
Start: 2017-08-16 | End: 2017-08-17 | Stop reason: HOSPADM

## 2017-08-16 RX ORDER — ALBUMIN HUMAN 50 G/1000ML
SOLUTION INTRAVENOUS CONTINUOUS PRN
Status: DISCONTINUED | OUTPATIENT
Start: 2017-08-16 | End: 2017-08-16

## 2017-08-16 RX ORDER — KETOROLAC TROMETHAMINE 30 MG/ML
15 INJECTION, SOLUTION INTRAMUSCULAR; INTRAVENOUS EVERY 8 HOURS
Status: DISCONTINUED | OUTPATIENT
Start: 2017-08-17 | End: 2017-08-17 | Stop reason: HOSPADM

## 2017-08-16 RX ORDER — DOCUSATE SODIUM 100 MG/1
100 CAPSULE, LIQUID FILLED ORAL 2 TIMES DAILY
Status: DISCONTINUED | OUTPATIENT
Start: 2017-08-16 | End: 2017-08-17 | Stop reason: HOSPADM

## 2017-08-16 RX ORDER — DOCUSATE SODIUM 100 MG/1
100 CAPSULE, LIQUID FILLED ORAL 2 TIMES DAILY PRN
Status: DISCONTINUED | OUTPATIENT
Start: 2017-08-16 | End: 2017-08-17 | Stop reason: HOSPADM

## 2017-08-16 RX ORDER — DULOXETIN HYDROCHLORIDE 30 MG/1
60 CAPSULE, DELAYED RELEASE ORAL DAILY
Status: DISCONTINUED | OUTPATIENT
Start: 2017-08-17 | End: 2017-08-17 | Stop reason: HOSPADM

## 2017-08-16 RX ORDER — SODIUM CHLORIDE 0.9 % (FLUSH) 0.9 %
3 SYRINGE (ML) INJECTION
Status: DISCONTINUED | OUTPATIENT
Start: 2017-08-16 | End: 2017-08-16

## 2017-08-16 RX ORDER — MEPERIDINE HYDROCHLORIDE 50 MG/ML
12.5 INJECTION INTRAMUSCULAR; INTRAVENOUS; SUBCUTANEOUS ONCE AS NEEDED
Status: DISCONTINUED | OUTPATIENT
Start: 2017-08-16 | End: 2017-08-16

## 2017-08-16 RX ORDER — KETOROLAC TROMETHAMINE 30 MG/ML
INJECTION, SOLUTION INTRAMUSCULAR; INTRAVENOUS
Status: DISCONTINUED | OUTPATIENT
Start: 2017-08-16 | End: 2017-08-16

## 2017-08-16 RX ORDER — HYDROMORPHONE HYDROCHLORIDE 2 MG/ML
0.4 INJECTION, SOLUTION INTRAMUSCULAR; INTRAVENOUS; SUBCUTANEOUS EVERY 5 MIN PRN
Status: DISCONTINUED | OUTPATIENT
Start: 2017-08-16 | End: 2017-08-16

## 2017-08-16 RX ORDER — ONDANSETRON 2 MG/ML
4 INJECTION INTRAMUSCULAR; INTRAVENOUS EVERY 8 HOURS PRN
Status: DISCONTINUED | OUTPATIENT
Start: 2017-08-16 | End: 2017-08-17 | Stop reason: HOSPADM

## 2017-08-16 RX ORDER — GLYCOPYRROLATE 0.2 MG/ML
INJECTION INTRAMUSCULAR; INTRAVENOUS
Status: DISCONTINUED | OUTPATIENT
Start: 2017-08-16 | End: 2017-08-16

## 2017-08-16 RX ORDER — OXYCODONE HYDROCHLORIDE 5 MG/1
10 TABLET ORAL EVERY 4 HOURS PRN
Status: DISCONTINUED | OUTPATIENT
Start: 2017-08-16 | End: 2017-08-17 | Stop reason: HOSPADM

## 2017-08-16 RX ORDER — NITROFURANTOIN 25; 75 MG/1; MG/1
100 CAPSULE ORAL EVERY 12 HOURS
Status: COMPLETED | OUTPATIENT
Start: 2017-08-16 | End: 2017-08-16

## 2017-08-16 RX ORDER — BISACODYL 10 MG
10 SUPPOSITORY, RECTAL RECTAL 2 TIMES DAILY
Status: COMPLETED | OUTPATIENT
Start: 2017-08-16 | End: 2017-08-17

## 2017-08-16 RX ORDER — ZOLPIDEM TARTRATE 5 MG/1
5 TABLET ORAL NIGHTLY PRN
Status: DISCONTINUED | OUTPATIENT
Start: 2017-08-16 | End: 2017-08-17 | Stop reason: HOSPADM

## 2017-08-16 RX ORDER — DIPHENHYDRAMINE HYDROCHLORIDE 50 MG/ML
12.5 INJECTION INTRAMUSCULAR; INTRAVENOUS EVERY 4 HOURS PRN
Status: DISCONTINUED | OUTPATIENT
Start: 2017-08-16 | End: 2017-08-17 | Stop reason: HOSPADM

## 2017-08-16 RX ORDER — SODIUM CHLORIDE 9 MG/ML
INJECTION, SOLUTION INTRAVENOUS CONTINUOUS
Status: DISCONTINUED | OUTPATIENT
Start: 2017-08-16 | End: 2017-08-17

## 2017-08-16 RX ORDER — DIPHENHYDRAMINE HYDROCHLORIDE 50 MG/ML
INJECTION INTRAMUSCULAR; INTRAVENOUS
Status: DISCONTINUED | OUTPATIENT
Start: 2017-08-16 | End: 2017-08-16

## 2017-08-16 RX ORDER — PHENYLEPHRINE HYDROCHLORIDE 10 MG/ML
INJECTION INTRAVENOUS
Status: DISCONTINUED | OUTPATIENT
Start: 2017-08-16 | End: 2017-08-16

## 2017-08-16 RX ORDER — HYDROMORPHONE HYDROCHLORIDE 2 MG/ML
1 INJECTION, SOLUTION INTRAMUSCULAR; INTRAVENOUS; SUBCUTANEOUS ONCE
Status: COMPLETED | OUTPATIENT
Start: 2017-08-16 | End: 2017-08-16

## 2017-08-16 RX ORDER — MIDAZOLAM HYDROCHLORIDE 1 MG/ML
INJECTION, SOLUTION INTRAMUSCULAR; INTRAVENOUS
Status: DISCONTINUED | OUTPATIENT
Start: 2017-08-16 | End: 2017-08-16

## 2017-08-16 RX ADMIN — MIDAZOLAM 2 MG: 1 INJECTION INTRAMUSCULAR; INTRAVENOUS at 12:08

## 2017-08-16 RX ADMIN — SODIUM CHLORIDE, SODIUM LACTATE, POTASSIUM CHLORIDE, AND CALCIUM CHLORIDE: 600; 310; 30; 20 INJECTION, SOLUTION INTRAVENOUS at 02:08

## 2017-08-16 RX ADMIN — SODIUM CHLORIDE, SODIUM LACTATE, POTASSIUM CHLORIDE, AND CALCIUM CHLORIDE: 600; 310; 30; 20 INJECTION, SOLUTION INTRAVENOUS at 11:08

## 2017-08-16 RX ADMIN — ALBUMIN (HUMAN): 2.5 SOLUTION INTRAVENOUS at 01:08

## 2017-08-16 RX ADMIN — SODIUM CHLORIDE: 0.9 INJECTION, SOLUTION INTRAVENOUS at 06:08

## 2017-08-16 RX ADMIN — PHENYLEPHRINE HYDROCHLORIDE 200 MCG: 10 INJECTION INTRAVENOUS at 01:08

## 2017-08-16 RX ADMIN — DIPHENHYDRAMINE HYDROCHLORIDE 12.5 MG: 50 INJECTION, SOLUTION INTRAMUSCULAR; INTRAVENOUS at 01:08

## 2017-08-16 RX ADMIN — PROPOFOL 30 MG: 10 INJECTION, EMULSION INTRAVENOUS at 04:08

## 2017-08-16 RX ADMIN — SODIUM CHLORIDE, SODIUM LACTATE, POTASSIUM CHLORIDE, AND CALCIUM CHLORIDE: 600; 310; 30; 20 INJECTION, SOLUTION INTRAVENOUS at 03:08

## 2017-08-16 RX ADMIN — PHENYLEPHRINE HYDROCHLORIDE 200 MCG: 10 INJECTION INTRAVENOUS at 02:08

## 2017-08-16 RX ADMIN — CEFAZOLIN SODIUM 2 G: 2 SOLUTION INTRAVENOUS at 06:08

## 2017-08-16 RX ADMIN — GLYCOPYRROLATE 0.2 MG: 0.2 INJECTION, SOLUTION INTRAMUSCULAR; INTRAVENOUS at 01:08

## 2017-08-16 RX ADMIN — ROCURONIUM BROMIDE 15 MG: 10 INJECTION INTRAVENOUS at 02:08

## 2017-08-16 RX ADMIN — ROCURONIUM BROMIDE 10 MG: 10 INJECTION INTRAVENOUS at 03:08

## 2017-08-16 RX ADMIN — ROCURONIUM BROMIDE 10 MG: 10 INJECTION INTRAVENOUS at 02:08

## 2017-08-16 RX ADMIN — KETOROLAC TROMETHAMINE 30 MG: 30 INJECTION, SOLUTION INTRAMUSCULAR; INTRAVENOUS at 04:08

## 2017-08-16 RX ADMIN — ROCURONIUM BROMIDE 15 MG: 10 INJECTION INTRAVENOUS at 01:08

## 2017-08-16 RX ADMIN — NEOSTIGMINE METHYLSULFATE 5 MG: 1 INJECTION INTRAVENOUS at 04:08

## 2017-08-16 RX ADMIN — CEFAZOLIN SODIUM 2 G: 2 SOLUTION INTRAVENOUS at 12:08

## 2017-08-16 RX ADMIN — NITROFURANTOIN MONOHYDRATE AND NITROFURANTOIN MACROCRYSTALLINE 100 MG: 75; 25 CAPSULE ORAL at 08:08

## 2017-08-16 RX ADMIN — FENTANYL CITRATE 100 MCG: 50 INJECTION, SOLUTION INTRAMUSCULAR; INTRAVENOUS at 12:08

## 2017-08-16 RX ADMIN — FENTANYL CITRATE 50 MCG: 50 INJECTION, SOLUTION INTRAMUSCULAR; INTRAVENOUS at 04:08

## 2017-08-16 RX ADMIN — FAMOTIDINE 20 MG: 20 TABLET, FILM COATED ORAL at 08:08

## 2017-08-16 RX ADMIN — ONDANSETRON 4 MG: 2 INJECTION, SOLUTION INTRAMUSCULAR; INTRAVENOUS at 03:08

## 2017-08-16 RX ADMIN — PHENYLEPHRINE HYDROCHLORIDE 300 MCG: 10 INJECTION INTRAVENOUS at 02:08

## 2017-08-16 RX ADMIN — DEXAMETHASONE SODIUM PHOSPHATE 8 MG: 4 INJECTION, SOLUTION INTRAMUSCULAR; INTRAVENOUS at 01:08

## 2017-08-16 RX ADMIN — BISACODYL 10 MG: 10 SUPPOSITORY RECTAL at 08:08

## 2017-08-16 RX ADMIN — BUPIVACAINE 20 ML: 13.3 INJECTION, SUSPENSION, LIPOSOMAL INFILTRATION at 03:08

## 2017-08-16 RX ADMIN — ACETAMINOPHEN 1000 MG: 10 INJECTION, SOLUTION INTRAVENOUS at 01:08

## 2017-08-16 RX ADMIN — GLYCOPYRROLATE 0.6 MG: 0.2 INJECTION, SOLUTION INTRAMUSCULAR; INTRAVENOUS at 04:08

## 2017-08-16 RX ADMIN — PROPOFOL 200 MG: 10 INJECTION, EMULSION INTRAVENOUS at 12:08

## 2017-08-16 RX ADMIN — ACETAMINOPHEN 1000 MG: 10 INJECTION, SOLUTION INTRAVENOUS at 10:08

## 2017-08-16 RX ADMIN — SCOPALAMINE 1.5 MG: 1 PATCH, EXTENDED RELEASE TRANSDERMAL at 10:08

## 2017-08-16 RX ADMIN — HYDROMORPHONE HYDROCHLORIDE 1 MG: 2 INJECTION INTRAMUSCULAR; INTRAVENOUS; SUBCUTANEOUS at 05:08

## 2017-08-16 RX ADMIN — DOCUSATE SODIUM 100 MG: 100 CAPSULE, LIQUID FILLED ORAL at 08:08

## 2017-08-16 RX ADMIN — ROCURONIUM BROMIDE 50 MG: 10 INJECTION INTRAVENOUS at 12:08

## 2017-08-16 RX ADMIN — LIDOCAINE HYDROCHLORIDE 75 MG: 20 INJECTION, SOLUTION INTRAVENOUS at 12:08

## 2017-08-16 RX ADMIN — CARBOXYMETHYLCELLULOSE SODIUM 2 DROP: 2.5 SOLUTION/ DROPS OPHTHALMIC at 12:08

## 2017-08-16 RX ADMIN — OXYCODONE HYDROCHLORIDE 5 MG: 5 TABLET ORAL at 05:08

## 2017-08-16 RX ADMIN — OXYCODONE HYDROCHLORIDE 5 MG: 5 TABLET ORAL at 08:08

## 2017-08-16 RX ADMIN — FENTANYL CITRATE 50 MCG: 50 INJECTION, SOLUTION INTRAMUSCULAR; INTRAVENOUS at 03:08

## 2017-08-16 NOTE — OR NURSING
Unable to void for [employer] specimen . Specimen container sent to surgery and note applied to obtain in surgery with catheter insertion

## 2017-08-16 NOTE — PLAN OF CARE
Problem: Patient Care Overview  Goal: Plan of Care Review  Outcome: Ongoing (interventions implemented as appropriate)  Pt free of trauma, falls, and injury. Pt VSS and afebrile throughout shift. Pt free of skin breakdown. Pt steri strips are intact with minimal drainage. Pt pain has been moderately controlled by PO pain meds and tolerated well. Pt has been eating and voiding adequately throughout shift. Catheter care maintained. Pt has call light in reach, bed alarm on, bed brakes on, side rails up x2, bed in low position, TEDs/SCDs on, IS at bedside, and nonskid socks on. Pt lying in bed in no distress. Will continue to monitor.

## 2017-08-16 NOTE — ANESTHESIA POSTPROCEDURE EVALUATION
"Anesthesia Post Evaluation    Patient: Bryan Queen    Procedure(s) Performed: Procedure(s) (LRB):  ROBOTIC ASSISTED LAPAROSCOPIC PROSTATECTOMY; lymphadenectomy (N/A)    Final Anesthesia Type: general  Patient location during evaluation: PACU  Patient participation: Yes- Able to Participate  Level of consciousness: awake and alert  Post-procedure vital signs: reviewed and stable  Pain management: adequate  Airway patency: patent  PONV status at discharge: No PONV  Anesthetic complications: no      Cardiovascular status: hemodynamically stable  Respiratory status: unassisted and spontaneous ventilation  Hydration status: euvolemic  Follow-up not needed.        Visit Vitals  BP (!) 148/72   Pulse 77   Temp 37 °C (98.6 °F)   Resp 16   Ht 5' 7" (1.702 m)   Wt 108.9 kg (240 lb)   SpO2 99%   BMI 37.59 kg/m²       Pain/Swathi Score: Pain Assessment Performed: Yes (8/16/2017  4:30 PM)  Presence of Pain: complains of pain/discomfort (8/16/2017  5:00 PM)  Pain Rating Prior to Med Admin: 6 (8/16/2017  5:25 PM)  Pain Rating Post Med Admin: 6 (8/16/2017  5:00 PM)  Swathi Score: 9 (8/16/2017  5:00 PM)      "

## 2017-08-16 NOTE — TRANSFER OF CARE
"Anesthesia Transfer of Care Note    Patient: Bryan Queen    Procedure(s) Performed: Procedure(s) (LRB):  ROBOTIC ASSISTED LAPAROSCOPIC PROSTATECTOMY; lymphadenectomy (N/A)    Patient location: PACU    Anesthesia Type: general    Transport from OR: Transported from OR on 2-3 L/min O2 by NC with adequate spontaneous ventilation. Continuous SpO2 monitoring in transport    Post pain: adequate analgesia    Post assessment: no apparent anesthetic complications    Post vital signs: stable    Level of consciousness: awake and responds to stimulation    Nausea/Vomiting: no nausea/vomiting    Complications: none    Transfer of care protocol was followed      Last vitals:   Visit Vitals  /68   Pulse 84   Temp 37 °C (98.6 °F)   Resp 16   Ht 5' 7" (1.702 m)   Wt 108.9 kg (240 lb)   SpO2 98%   BMI 37.59 kg/m²     "

## 2017-08-16 NOTE — H&P
Patient ID: Bryan Queen is a 66 y.o. male.     Chief Complaint: Follow-up     The patient has chosen to proceed with robotic surgery.  He presents with his wife for preop appointment.  No significant lower urinary tract symptoms except nocturia.  No significant erectile dysfunction.  No previous abdominal surgeries          Past Medical History:   Diagnosis Date    Arthritis      BPH (benign prostatic hypertrophy)      Colon polyps      Elevated PSA      Groin abscess      Lumbar radiculopathy      Obstructive sleep apnea (adult) (pediatric)       CPAP hs    PONV (postoperative nausea and vomiting)      Spinal stenosis              Past Surgical History:   Procedure Laterality Date    CERVICAL FUSION   1/27/2009     C3-4 fusion    COLONOSCOPY N/A 9/13/2016     Procedure: COLONOSCOPY;  Surgeon: ROWENA Ahn MD;  Location: [facility] ([address] [place] [address]);  Service: Endoscopy;  Laterality: N/A;  Patient requested the week of 9/12. Patient reports PONV.    KNEE SURGERY Left 4-13-15     TK    KNEE SURGERY Right 8-19-15     TK    LUMBAR DISCECTOMY   12/3/2009     L5-S1 microdiscex    SPINE SURGERY   04/01/13     L4/5 laminectomy    TONSILLECTOMY                Family History   Problem Relation Age of Onset    Diabetes Mother      Hypertension Mother      Diabetes Sister      Diabetes Brother        Social History            Social History    Marital status:        Spouse name: N/A    Number of children: N/A    Years of education: N/A             Social History Main Topics    Smoking status: Never Smoker    Smokeless tobacco: Never Used    Alcohol use Yes         Comment: occasional    Drug use: No    Sexual activity: Yes       Partners: Female           Other Topics Concern    None          Social History Narrative    None         Current Medications   Current Outpatient Prescriptions   Medication Sig Dispense Refill    clotrimazole (LOTRIMIN) 1 % cream Apply to affected area 2 times  "daily 15 g 0    docusate sodium (COLACE) 100 MG capsule Take 1 capsule (100 mg total) by mouth 2 (two) times daily as needed for Constipation. 60 capsule 1    duloxetine (CYMBALTA) 60 MG capsule Take 1 capsule (60 mg total) by mouth once daily. 90 capsule 3    oxycodone-acetaminophen (PERCOCET)  mg per tablet Take 1 tablet by mouth 2 (two) times daily as needed for Pain. 60 tablet 0    tamsulosin (FLOMAX) 0.4 mg Cp24 TAKE 1 CAPSULE (0.4 MG TOTAL) BY MOUTH ONCE DAILY. 90 capsule 1    fluocinonide (LIDEX) 0.05 % ointment Apply topically 2 (two) times daily. DO NOT USE ON FACE 60 g 0      No current facility-administered medications for this visit.                Review of patient's allergies indicates:   Allergen Reactions    Hydroxyzine Nausea And Vomiting and Other (See Comments)       SWEATING    Lyrica [pregabalin] Rash    Mobic [meloxicam] Nausea And Vomiting       Tinnitus    Tramadol Nausea And Vomiting       Nauseous,Sweating, Ringing in the ears         Review of Systems    Objective:      Vitals       Vitals:     06/23/17 1543   BP: 129/69   BP Location: Right arm   Patient Position: Sitting   BP Method: Automatic   Pulse: 85   Weight: 108.9 kg (240 lb)   Height: 5' 7" (1.702 m)         Physical Exam     Ao*4  resp normal rate' breathing not labored  cv normal rate  Ab nondistended  Ext no edema  Previous prostate examination no nodules     Lab Review:   CBC:         Lab Results   Component Value Date     WBC 9.60 08/20/2015     RBC 3.93 (L) 08/20/2015     HGB 11.5 (L) 08/20/2015     HCT 35.6 (L) 08/20/2015      08/20/2015      BMP:         Lab Results   Component Value Date     GLU 90 12/21/2016      12/21/2016     K 4.9 12/21/2016      12/21/2016     CO2 28 12/21/2016     BUN 20 12/21/2016     CREATININE 1.3 12/21/2016     CALCIUM 9.0 12/21/2016            Lab Results   Component Value Date     PSA 4.6 (H) 12/21/2016     PSA 1.29 02/27/2013     PSA 1.1 07/01/2011     " PSADIAG 4.6 (H) 03/03/2017      FINAL PATHOLOGIC DIAGNOSIS  PROSTATE BIOPSIES 1, 3, 4, 5, AND 6:  NO CARCINOMA IDENTIFIED  2. BIOPSIES OF LEFT MID PROSTATE:  ADENOCARCINOMA TOTAL MARTINEZ SCORE 6 (3+3)--SEE DESCRIPTION  ASR  Diagnosed by: James Friedman M.D.  (Electronically Signed: 2017-03-15 08:36:08)  Microscopic Examination  2. A microscopic focus of carcinoma involves under 5% of this specimen. In this region there are glands with a  smaller than usual caliber, somewhat erratically infiltrating. There is slight nuclear enlargement with a few visible  nucleoli. These glands lack a basal layer with the p63 and high molecular weight cytokeratin stains. There is some  AMACR positivity in this region, and some other glands in the specimen have at least some positivity. The positive and  negative controls stained appropriately. The pattern of the carcinoma is interpreted as Martinez grade 3. A few foci  suggest the possibility of early fused glands, but this picture may be the result of some tangent     Diagnostics Review:   TRUS 19.2 g     Assessment:       1. Prostate cancer    2. Malignant neoplasm of prostate        Plan:       Robotic prostatectomy +/- lymphadenectomy July 6        Mr. Queen and his wife and I discussed again all the treatment options for his prostate cancer.  His case was discussed at  oncology conference also.  Based on his multiple risk factors and a component of high-grade disease I think active treatment is a better course than active surveillance.

## 2017-08-16 NOTE — PROGRESS NOTES
gu post op  Awake and alert  Comfortable  Af vss  Urine clear  Ab benign  Discussed operative findings  Routine post op  Consult hosp med to assist with management  Ambulate  Cont quintana  Cont antibiotics while quintana in place  Hopefully dc home tomorrow  Advance diet as tolerated

## 2017-08-17 VITALS
BODY MASS INDEX: 37.67 KG/M2 | OXYGEN SATURATION: 96 % | DIASTOLIC BLOOD PRESSURE: 78 MMHG | RESPIRATION RATE: 18 BRPM | SYSTOLIC BLOOD PRESSURE: 170 MMHG | WEIGHT: 240 LBS | HEART RATE: 80 BPM | HEIGHT: 67 IN | TEMPERATURE: 99 F

## 2017-08-17 PROBLEM — E66.01 MORBID OBESITY DUE TO EXCESS CALORIES: Status: ACTIVE | Noted: 2017-08-17

## 2017-08-17 PROCEDURE — 25000003 PHARM REV CODE 250

## 2017-08-17 PROCEDURE — 63600175 PHARM REV CODE 636 W HCPCS: Performed by: UROLOGY

## 2017-08-17 PROCEDURE — 99223 1ST HOSP IP/OBS HIGH 75: CPT | Mod: ,,,

## 2017-08-17 PROCEDURE — 25000003 PHARM REV CODE 250: Performed by: HOSPITALIST

## 2017-08-17 PROCEDURE — 25000003 PHARM REV CODE 250: Performed by: UROLOGY

## 2017-08-17 RX ORDER — HYDRALAZINE HYDROCHLORIDE 25 MG/1
25 TABLET, FILM COATED ORAL ONCE
Status: COMPLETED | OUTPATIENT
Start: 2017-08-17 | End: 2017-08-17

## 2017-08-17 RX ORDER — AMLODIPINE BESYLATE 5 MG/1
5 TABLET ORAL DAILY
Status: DISCONTINUED | OUTPATIENT
Start: 2017-08-17 | End: 2017-08-17 | Stop reason: HOSPADM

## 2017-08-17 RX ORDER — OXYCODONE AND ACETAMINOPHEN 5; 325 MG/1; MG/1
1 TABLET ORAL EVERY 4 HOURS PRN
Qty: 30 TABLET | Refills: 0 | Status: SHIPPED | OUTPATIENT
Start: 2017-08-17 | End: 2018-01-29 | Stop reason: ALTCHOICE

## 2017-08-17 RX ORDER — AMLODIPINE BESYLATE 5 MG/1
5 TABLET ORAL DAILY
Qty: 30 TABLET | Refills: 0 | Status: SHIPPED | OUTPATIENT
Start: 2017-08-17 | End: 2018-01-29 | Stop reason: SDUPTHER

## 2017-08-17 RX ADMIN — KETOROLAC TROMETHAMINE 15 MG: 30 INJECTION, SOLUTION INTRAMUSCULAR at 12:08

## 2017-08-17 RX ADMIN — BISACODYL 10 MG: 10 SUPPOSITORY RECTAL at 09:08

## 2017-08-17 RX ADMIN — HYDRALAZINE HYDROCHLORIDE 25 MG: 25 TABLET, FILM COATED ORAL at 06:08

## 2017-08-17 RX ADMIN — ACETAMINOPHEN 1000 MG: 10 INJECTION, SOLUTION INTRAVENOUS at 05:08

## 2017-08-17 RX ADMIN — CEFAZOLIN SODIUM 2 G: 2 SOLUTION INTRAVENOUS at 12:08

## 2017-08-17 RX ADMIN — DOCUSATE SODIUM 100 MG: 100 CAPSULE, LIQUID FILLED ORAL at 09:08

## 2017-08-17 RX ADMIN — KETOROLAC TROMETHAMINE 15 MG: 30 INJECTION, SOLUTION INTRAMUSCULAR at 09:08

## 2017-08-17 RX ADMIN — KETOROLAC TROMETHAMINE 15 MG: 30 INJECTION, SOLUTION INTRAMUSCULAR at 05:08

## 2017-08-17 RX ADMIN — SODIUM CHLORIDE: 0.9 INJECTION, SOLUTION INTRAVENOUS at 06:08

## 2017-08-17 RX ADMIN — AMLODIPINE BESYLATE 5 MG: 5 TABLET ORAL at 09:08

## 2017-08-17 RX ADMIN — ZOLPIDEM TARTRATE 5 MG: 5 TABLET, FILM COATED ORAL at 01:08

## 2017-08-17 RX ADMIN — CEFAZOLIN SODIUM 2 G: 2 SOLUTION INTRAVENOUS at 06:08

## 2017-08-17 RX ADMIN — DULOXETINE 60 MG: 30 CAPSULE, DELAYED RELEASE ORAL at 09:08

## 2017-08-17 RX ADMIN — FAMOTIDINE 20 MG: 20 TABLET, FILM COATED ORAL at 09:08

## 2017-08-17 RX ADMIN — ACETAMINOPHEN 1000 MG: 10 INJECTION, SOLUTION INTRAVENOUS at 07:08

## 2017-08-17 NOTE — NURSING
No significant events overnight. Remains free from fall, injury, and skin breakdown. Voiding via quintana to gravity; good urinary output; urine clear yellow at EOS. VSS on 1L/O2 throughout the night. Pain well controlled with PO. Incision/dressing intact; scant moist drainage to umbilical site. TEDs/SCDs in place. Plan of care reviewed with patient and all questions answered. Bed low, locked w/ bed alarm on. Call light within reach. Purposeful rounding performed. Resting comfortably in bed, no other complaints at this time.

## 2017-08-17 NOTE — OP NOTE
DATE OF PROCEDURE:  08/16/2017.    SURGEON:  Kieran Evans M.D.    ASSISTANT:  Danuta Alaniz RN, first assistant.    PREOPERATIVE DIAGNOSIS:  Prostatic adenocarcinoma, Martinez 6, clinical T1 cN0   M0.    POSTOPERATIVE DIAGNOSIS:  Pathology pending.    ANESTHESIA:  General.    BLOOD LOSS:  150 mL    COMPLICATIONS:  None.    HISTORY:  Mr. Queen is a 66-year-old -American man with a strong family   history of prostate cancer.  On exam, there are no nodules; however, his PSA was   elevated.  The patient requested PSA screening.  His PSA was 4.6 and his   prostate was rather small, being only 18 g.  Biopsy revealed a single focus of   [place] 6.  On review with pathology; however, there did appear to be a tertiary focus of   high-grade [place] grade 4 disease.  Given the patient's elevated PSA density   -American race, family history of prostate cancer and component of   high-grade disease, he wished to proceed with active treatment rather than   active surveillance.  MRI revealed no adenopathy and no extraprostatic disease.    After discussing all the options in detail, the patient presents for robotic   prostatectomy.  We discussed the risk and benefits of lymphadenectomy opted against this given his low risk disease.  Informed consent was obtained.    All the risks and benefits were discussed with the patient and all the   alternatives were discussed with him.  All of his questions were answered.    PROCEDURE IN DETAIL:  The patient was brought to the Operating Room and   undergoes general endotracheal anesthesia.  Preoperative IV antibiotics were   administered and TEDs and SCDs were applied.  The patient was positioned with   the assistance of anesthesia.  The abdomen and genital areas were prepped and   draped in a sterile fashion.  Primary access was obtained above the umbilicus   with the Veress needle technique.  The needle irrigates and aspirates without   abnormality and a drop test is  negative.  The abdomen was insufflated with low   opening pressures.  A dilating 8 mm trocar was placed above the umbilicus in the   midline.  The abdomen was inspected with the laparoscope.  No injuries occurred   with the primary trocar placement.  The remaining trocars were placed in the   usual fashion under direct vision utilizing 3 additional 8 mm metallic robotic   trocars in line with the umbilicus in addition to a dilating 12-mm trocar in the   right lower quadrant and a 5-mm dilating AirSeal trocar in the right upper   quadrant.  The patient was placed in Trendelenburg position and the rectum is   retracted.  The peritoneum in the pouch of Bharath is incised and the seminal   vesicles and vas are dissected.  Dissection was performed between the layers of   the rectoprostatic fascia.  Dissection was carried laterally to the perirectal   fat.  A small capsulotomy was noted at the base.  This was closed with 3-0 silk   suture.  The urachus was divided and the medial umbilical ligaments were   divided.  The bladder was mobilized and the space of Retzius was entered.  Fat   was removed from the anterior surface of the prostate after dividing the   superficial dorsal vein of the prostate.  Utilizing bipolar and scissors.  The   fat was sent for histologic analysis.  On both sides, the endopelvic fascia was   incised and the puboprostatic ligaments were incised.  Early release of the   neurovascular bundles is performed.  The base of the prostate was divided from   the base of the bladder.  A bladder neck sparing technique is utilized.  Care   was taken to avoid the ureteral orifices.  The previously dissected vasa and   seminal vesicles were brought anteriorly.  On both sides, the prostatic pedicles   were controlled with the combination of clips and scissors.  The neurovascular   bundles were dissected and  energy is avoided in this area.  The apex of the   prostate was dissected from the urethra.  The urethra  was divided sharply and   the rectourethralis ligament was divided.  The specimen was inspected and placed   in an EndoCatch sac.  The rectum was inspected and found to be intact.  A   running anastomosis was performed with 2-0 Monocryl over a Surgicel bolster.    Care was taken to avoid the ureteral orifices.  A new Apple catheter was placed.    The anastomotic suture was tied.  The bladder was distended.  There was no   leakage from the anastomosis.  Some oozing from the neurovascular bundles was   controlled with Surgiflo.  This was observed.  The pressure was lowered.    Abdomen was irrigated.  There was good hemostasis.  The instruments were removed   and the robot was undocked.  The trocars were removed under direct vision.  The   specimen was extracted through the periumbilical trocar site.  The parietal   peritoneum in this location is injected with Exparel.  The fascia was closed   with interrupted #1 PDS sutures.  The abdomen was again insufflated and the   abdomen was inspected with the laparoscope again.  The remaining trocars were   removed under direct vision at low pressure.  There was good hemostasis.  The remaining sites   are palpated and none require closure at the fascial level.  The skin   incisions were closed in the usual fashion and sterile adhesive dressings were   placed.  All counts were correct.  The patient tolerated the procedure well and   awoke from anesthesia without difficulty.      LESLIE  dd: 08/16/2017 17:01:34 (CDT)  td: 08/17/2017 02:12:00 (CDMANDEEP)  Doc ID   #[id number]  Job ID #[id number]    CC:

## 2017-08-17 NOTE — SUBJECTIVE & OBJECTIVE
Past Medical History:   Diagnosis Date    Arthritis     BPH (benign prostatic hypertrophy)     Cancer     prostate    Colon polyps     Elevated PSA     Groin abscess     Lumbar radiculopathy     Obstructive sleep apnea (adult) (pediatric)     CPAP hs    PONV (postoperative nausea and vomiting)     Spinal stenosis        Past Surgical History:   Procedure Laterality Date    CERVICAL FUSION  1/27/2009    C3-4 fusion    COLONOSCOPY N/A 9/13/2016    Procedure: COLONOSCOPY;  Surgeon: ROWENA Ahn MD;  Location: [address]);  Service: Endoscopy;  Laterality: N/A;  Patient requested the week of 9/12. Patient reports PONV.    KNEE SURGERY Left 4-13-15    TK    KNEE SURGERY Right 8-19-15    TK    LUMBAR DISCECTOMY  12/3/2009    L5-S1 microdiscex    SPINE SURGERY  04/01/13    L4/5 laminectomy    TONSILLECTOMY         Review of patient's allergies indicates:   Allergen Reactions    Hydroxyzine Nausea And Vomiting and Other (See Comments)     SWEATING    Lyrica [pregabalin] Rash    Mobic [meloxicam] Nausea And Vomiting     Tinnitus    Tramadol Nausea And Vomiting     Nauseous,Sweating, Ringing in the ears       No current facility-administered medications on file prior to encounter.      Current Outpatient Prescriptions on File Prior to Encounter   Medication Sig    clotrimazole (LOTRIMIN) 1 % cream Apply to affected area 2 times daily    tamsulosin (FLOMAX) 0.4 mg Cp24 TAKE 1 CAPSULE (0.4 MG TOTAL) BY MOUTH ONCE DAILY.    docusate sodium (COLACE) 100 MG capsule Take 1 capsule (100 mg total) by mouth 2 (two) times daily as needed for Constipation.    fluocinonide (LIDEX) 0.05 % ointment Apply topically 2 (two) times daily. DO NOT USE ON FACE    oxycodone-acetaminophen (PERCOCET)  mg per tablet Take 1 tablet by mouth 2 (two) times daily as needed for Pain.     Family History     Problem Relation (Age of Onset)    Diabetes Mother, Sister, Brother    Hypertension Mother        Social  History Main Topics    Smoking status: Never Smoker    Smokeless tobacco: Never Used    Alcohol use 0.6 oz/week     1 Glasses of wine per week      Comment: occasional    Drug use: No    Sexual activity: Yes     Partners: Female     Review of Systems   Constitutional: Negative.  Negative for chills, fever and unexpected weight change.   HENT: Negative for dental problem, ear pain and trouble swallowing.    Eyes: Negative.    Respiratory: Negative for cough, shortness of breath and wheezing.    Cardiovascular: Negative for chest pain and leg swelling.   Gastrointestinal: Negative for abdominal distention, diarrhea and vomiting.   Endocrine: Negative for polydipsia and polyphagia.   Genitourinary: Negative for dysuria and flank pain.   Musculoskeletal: Positive for arthralgias. Negative for myalgias, neck pain and neck stiffness.   Skin: Negative for rash.   Allergic/Immunologic: Negative.    Neurological: Negative for syncope, weakness and headaches.   Psychiatric/Behavioral: Negative for agitation and behavioral problems.   All other systems reviewed and are negative.    Objective:     Vital Signs (Most Recent):  Temp: 98.2 °F (36.8 °C) (08/17/17 1200)  Pulse: 81 (08/17/17 1200)  Resp: 18 (08/17/17 1200)  BP: (!) 141/62 (08/17/17 1200)  SpO2: 97 % (08/17/17 1200) Vital Signs (24h Range):  Temp:  [97.4 °F (36.3 °C)-99.2 °F (37.3 °C)] 98.2 °F (36.8 °C)  Pulse:  [73-85] 81  Resp:  [16-20] 18  SpO2:  [93 %-99 %] 97 %  BP: (128-179)/(60-82) 141/62     Weight: 108.9 kg (240 lb)  Body mass index is 37.59 kg/m².    Physical Exam   Constitutional: He is oriented to person, place, and time. He appears well-developed and well-nourished.  Non-toxic appearance. He does not have a sickly appearance.   HENT:   Head: Normocephalic and atraumatic.   Right Ear: Hearing normal.   Left Ear: Hearing normal.   Mouth/Throat: Oropharynx is clear and moist and mucous membranes are normal.   Eyes: Conjunctivae and EOM are normal. Pupils  are equal, round, and reactive to light.   Neck: Normal range of motion. Neck supple. Carotid bruit is not present. No Brudzinski's sign and no Kernig's sign noted.   Cardiovascular: Normal rate, regular rhythm, S1 normal, S2 normal, normal heart sounds, intact distal pulses and normal pulses.  Exam reveals no gallop and no S3.    No murmur heard.  Pulmonary/Chest: Effort normal and breath sounds normal. No accessory muscle usage. No respiratory distress. He has no wheezes. He has no rales.   Abdominal: Soft. Normal appearance and bowel sounds are normal. He exhibits no distension and no pulsatile midline mass. There is no hepatosplenomegaly. There is no tenderness.   Genitourinary:   Genitourinary Comments: Dark urine in quintana, no clots   Musculoskeletal: Normal range of motion.   Bilateral knee scars from previous TKA   Neurological: He is alert and oriented to person, place, and time. He has normal strength and normal reflexes. No cranial nerve deficit or sensory deficit. GCS eye subscore is 4. GCS verbal subscore is 5. GCS motor subscore is 6.   Skin: Skin is warm, dry and intact. Capillary refill takes less than 2 seconds. No rash noted.   Psychiatric: He has a normal mood and affect. His speech is normal and behavior is normal. Judgment and thought content normal. Cognition and memory are normal.   Nursing note and vitals reviewed.      Significant Labs:   Recent Lab Results     None          Significant Imaging: I have reviewed and interpreted all pertinent imaging results/findings within the past 24 hours.

## 2017-08-17 NOTE — PLAN OF CARE
DC Planning:    Writer met with patient while ambulating in the mckeon with ODALYS Montoya. Patient reports living with wife and daughter checks in often. Patient owns a cane and wheelchair, no DME needs needed. CM to follow and remain supportive, no needs expressed.      08/17/17 1112   Discharge Assessment   Assessment Type Discharge Planning Assessment   Assessment information obtained from? Patient   Prior to hospitilization cognitive status: Alert/Oriented   Prior to hospitalization functional status: Independent;Assistive Equipment   Current cognitive status: Alert/Oriented   Current Functional Status: Independent;Assistive Equipment   Arrived From admitted as an inpatient   Lives With spouse   Able to Return to Prior Arrangements yes   Is patient able to care for self after discharge? Yes   How many people do you have in your home that can help with your care after discharge? 1   Who are your caregiver(s) and their phone number(s)? Terri Queen, wife, [phone]   Patient currently being followed by outpatient case management? No   Patient currently receives home health services? No   Does the patient currently use HME? Yes   Patient currently receives private duty nursing? N/A   Equipment Currently Used at Home cane, straight;wheelchair   Do you have any problems affording any of your prescribed medications? No   Is the patient taking medications as prescribed? yes   Do you have any financial concerns preventing you from receiving the healthcare you need? No   Does the patient have transportation to healthcare appointments? Yes   Transportation Available car   Discharge Plan A Home with family   Patient/Family In Agreement With Plan yes

## 2017-08-17 NOTE — DISCHARGE SUMMARY
[facility]  Urology  Discharge Summary      Patient Name: Bryan Queen  MRN: [id number]  Admission Date: 8/16/2017  Hospital Length of Stay: 1 days  Discharge Date and Time:  08/17/2017 6:00 PM  Attending Physician: Kieran Pickard MD   Discharging Provider: Kieran Pickard MD  Primary Care Physician: Remy Payne MD    HPI:   No notes on file    Procedure(s) (LRB):  ROBOTIC ASSISTED LAPAROSCOPIC PROSTATECTOMY; lymphadenectomy (N/A)     Indwelling Lines/Drains at time of discharge:   Lines/Drains/Airways     Drain                 NG/OG Tube 08/16/17 1244 [place] [employer] sump 16 Fr. Right mouth 1 day         Urethral Catheter 08/16/17 1300 [employer];Straight-tip 20 Fr. 1 day                Hospital Course (synopsis of major diagnoses, care, treatment, and services provided during the course of the hospital stay):   pod1  shaneka reg diet  Pain controlled  +flatus  Eager to go home  hosp med seeing pt re BP    Consults:   Consults         Status Ordering Provider     Inpatient consult to Hospitalist  Once     Provider:  Fermín Casanova MD    Acknowledged KIERAN PICKARD          Significant Diagnostic Studies: -    Pending Diagnostic Studies:     None          Final Active Diagnoses:    Diagnosis Date Noted POA    PRINCIPAL PROBLEM:  Malignant neoplasm of prostate [C61] 08/16/2017 Yes    Morbid obesity due to excess calories [E66.01] 08/17/2017 Yes    Neuropathic pain of lower extremity [G57.90] 08/07/2012 Yes      Problems Resolved During this Admission:    Diagnosis Date Noted Date Resolved POA         Discharged Condition: good    Disposition:     Follow Up:  Follow-up Information     Kieran Pickard MD On 8/25/2017.    Specialty:  Urology  Contact information:  [address] [place] [id number]  [phone]                 Patient Instructions:     Nursing communication   Order Comments: Pt will go home with quintana  Please give leg bag for daytime use and large bag for nighttime use        Medications:  Reconciled Home Medications:   Current Discharge Medication List      START taking these medications    Details   amlodipine (NORVASC) 5 MG tablet Take 1 tablet (5 mg total) by mouth once daily.  Qty: 30 tablet, Refills: 0      oxycodone-acetaminophen (PERCOCET) 5-325 mg per tablet Take 1 tablet by mouth every 4 (four) hours as needed.  Qty: 30 tablet, Refills: 0         CONTINUE these medications which have NOT CHANGED    Details   clotrimazole (LOTRIMIN) 1 % cream Apply to affected area 2 times daily  Qty: 15 g, Refills: 0      duloxetine (CYMBALTA) 60 MG capsule Take 1 capsule (60 mg total) by mouth once daily.  Qty: 90 capsule, Refills: 3      OMEGA 3,6,9 COMBINATION NO.7 (OMEGA DHA ORAL) Take 1 capsule by mouth 2 (two) times daily.      tamsulosin (FLOMAX) 0.4 mg Cp24 TAKE 1 CAPSULE (0.4 MG TOTAL) BY MOUTH ONCE DAILY.  Qty: 90 capsule, Refills: 1      docusate sodium (COLACE) 100 MG capsule Take 1 capsule (100 mg total) by mouth 2 (two) times daily as needed for Constipation.  Qty: 60 capsule, Refills: 1      fluocinonide (LIDEX) 0.05 % ointment Apply topically 2 (two) times daily. DO NOT USE ON FACE  Qty: 60 g, Refills: 0      naproxen sodium (ANAPROX) 220 MG tablet Take 220 mg by mouth as needed.      oxycodone-acetaminophen (PERCOCET)  mg per tablet Take 1 tablet by mouth 2 (two) times daily as needed for Pain.  Qty: 60 tablet, Refills: 0           See PCP re BP    Time spent on the discharge of patient: 30 minutes    Kieran Evans MD  Urology  [facility]

## 2017-08-17 NOTE — PLAN OF CARE
Problem: Patient Care Overview  Goal: Plan of Care Review  Outcome: Ongoing (interventions implemented as appropriate)  Patient in no apparent distress. Sat's 97  % on 2 lpm . Will continue to monitor.

## 2017-08-17 NOTE — CONSULTS
[facility] Hospital Medicine  Consult Note    Patient Name: Bryan Queen  MRN: [id number]  Admission Date: 8/16/2017  Hospital Length of Stay: 1 days  Attending Physician: KATHY SOSA   Primary Care Provider: Reym Payne MD           Patient information was obtained from patient, spouse/SO and ER records.     Consults  Subjective:     Principal Problem: Malignant neoplasm of prostate    Chief Complaint: No chief complaint on file.       HPI: 67 yo male post prostatectomy, doing well  Asked to assist with medical managemeny    Past Medical History:   Diagnosis Date    Arthritis     BPH (benign prostatic hypertrophy)     Cancer     prostate    Colon polyps     Elevated PSA     Groin abscess     Lumbar radiculopathy     Obstructive sleep apnea (adult) (pediatric)     CPAP hs    PONV (postoperative nausea and vomiting)     Spinal stenosis        Past Surgical History:   Procedure Laterality Date    CERVICAL FUSION  1/27/2009    C3-4 fusion    COLONOSCOPY N/A 9/13/2016    Procedure: COLONOSCOPY;  Surgeon: ROWENA Ahn MD;  Location: [address];  Service: Endoscopy;  Laterality: N/A;  Patient requested the week of 9/12. Patient reports PONV.    KNEE SURGERY Left 4-13-15    TK    KNEE SURGERY Right 8-19-15    TK    LUMBAR DISCECTOMY  12/3/2009    L5-S1 microdiscex    SPINE SURGERY  04/01/13    L4/5 laminectomy    TONSILLECTOMY         Review of patient's allergies indicates:   Allergen Reactions    Hydroxyzine Nausea And Vomiting and Other (See Comments)     SWEATING    Lyrica [pregabalin] Rash    Mobic [meloxicam] Nausea And Vomiting     Tinnitus    Tramadol Nausea And Vomiting     Nauseous,Sweating, Ringing in the ears       No current facility-administered medications on file prior to encounter.      Current Outpatient Prescriptions on File Prior to Encounter   Medication Sig    clotrimazole (LOTRIMIN) 1 % cream Apply to affected area 2 times daily     tamsulosin (FLOMAX) 0.4 mg Cp24 TAKE 1 CAPSULE (0.4 MG TOTAL) BY MOUTH ONCE DAILY.    docusate sodium (COLACE) 100 MG capsule Take 1 capsule (100 mg total) by mouth 2 (two) times daily as needed for Constipation.    fluocinonide (LIDEX) 0.05 % ointment Apply topically 2 (two) times daily. DO NOT USE ON FACE    oxycodone-acetaminophen (PERCOCET)  mg per tablet Take 1 tablet by mouth 2 (two) times daily as needed for Pain.     Family History     Problem Relation (Age of Onset)    Diabetes Mother, Sister, Brother    Hypertension Mother        Social History Main Topics    Smoking status: Never Smoker    Smokeless tobacco: Never Used    Alcohol use 0.6 oz/week     1 Glasses of wine per week      Comment: occasional    Drug use: No    Sexual activity: Yes     Partners: Female     Review of Systems   Constitutional: Negative.  Negative for chills, fever and unexpected weight change.   HENT: Negative for dental problem, ear pain and trouble swallowing.    Eyes: Negative.    Respiratory: Negative for cough, shortness of breath and wheezing.    Cardiovascular: Negative for chest pain and leg swelling.   Gastrointestinal: Negative for abdominal distention, diarrhea and vomiting.   Endocrine: Negative for polydipsia and polyphagia.   Genitourinary: Negative for dysuria and flank pain.   Musculoskeletal: Positive for arthralgias. Negative for myalgias, neck pain and neck stiffness.   Skin: Negative for rash.   Allergic/Immunologic: Negative.    Neurological: Negative for syncope, weakness and headaches.   Psychiatric/Behavioral: Negative for agitation and behavioral problems.   All other systems reviewed and are negative.    Objective:     Vital Signs (Most Recent):  Temp: 98.2 °F (36.8 °C) (08/17/17 1200)  Pulse: 81 (08/17/17 1200)  Resp: 18 (08/17/17 1200)  BP: (!) 141/62 (08/17/17 1200)  SpO2: 97 % (08/17/17 1200) Vital Signs (24h Range):  Temp:  [97.4 °F (36.3 °C)-99.2 °F (37.3 °C)] 98.2 °F (36.8 °C)  Pulse:   [73-85] 81  Resp:  [16-20] 18  SpO2:  [93 %-99 %] 97 %  BP: (128-179)/(60-82) 141/62     Weight: 108.9 kg (240 lb)  Body mass index is 37.59 kg/m².    Physical Exam   Constitutional: He is oriented to person, place, and time. He appears well-developed and well-nourished.  Non-toxic appearance. He does not have a sickly appearance.   HENT:   Head: Normocephalic and atraumatic.   Right Ear: Hearing normal.   Left Ear: Hearing normal.   Mouth/Throat: Oropharynx is clear and moist and mucous membranes are normal.   Eyes: Conjunctivae and EOM are normal. Pupils are equal, round, and reactive to light.   Neck: Normal range of motion. Neck supple. Carotid bruit is not present. No Brudzinski's sign and no Kernig's sign noted.   Cardiovascular: Normal rate, regular rhythm, S1 normal, S2 normal, normal heart sounds, intact distal pulses and normal pulses.  Exam reveals no gallop and no S3.    No murmur heard.  Pulmonary/Chest: Effort normal and breath sounds normal. No accessory muscle usage. No respiratory distress. He has no wheezes. He has no rales.   Abdominal: Soft. Normal appearance and bowel sounds are normal. He exhibits no distension and no pulsatile midline mass. There is no hepatosplenomegaly. There is no tenderness.   Genitourinary:   Genitourinary Comments: Dark urine in quintana, no clots   Musculoskeletal: Normal range of motion.   Bilateral knee scars from previous TKA   Neurological: He is alert and oriented to person, place, and time. He has normal strength and normal reflexes. No cranial nerve deficit or sensory deficit. GCS eye subscore is 4. GCS verbal subscore is 5. GCS motor subscore is 6.   Skin: Skin is warm, dry and intact. Capillary refill takes less than 2 seconds. No rash noted.   Psychiatric: He has a normal mood and affect. His speech is normal and behavior is normal. Judgment and thought content normal. Cognition and memory are normal.   Nursing note and vitals reviewed.      Significant Labs:    Recent Lab Results     None          Significant Imaging: I have reviewed and interpreted all pertinent imaging results/findings within the past 24 hours.    Assessment/Plan:     Neuropathic pain of lower extremity    Right knee pain worse, check x ray          * Malignant neoplasm of prostate    POD 1 prostatectomy            VTE Risk Mitigation         Ordered     High Risk of VTE  Once      08/16/17 1802     Reason for No Pharmacological VTE Prophylaxis  Once      08/16/17 1802     Place sequential compression device  Until discontinued      08/16/17 1802     Place MONIQUE hose  Until discontinued      08/16/17 1802              Thank you for your consult. I will follow-up with patient. Please contact us if you have any additional questions.    Clayton Andrew PA-C  Department of Hospital Medicine   [facility]

## 2017-08-17 NOTE — PLAN OF CARE
Problem: Patient Care Overview  Goal: Plan of Care Review  Outcome: Ongoing (interventions implemented as appropriate)  Patient on RA sat's 96% with no noted distress oxygen not needed.

## 2017-08-18 NOTE — DISCHARGE INSTRUCTIONS
Discharge Instructions for Radical Prostatectomy  You had a procedure called radical prostatectomy (removal of the entire prostate and surrounding tissues). This sheet will help you know what to do following surgery.  Activity  · Dont drive until your health care provider says its OK. This is usually after your catheter is removed and you are no longer taking pain medication.  · For the first 2 weeks after surgery, limit physical activity. This will allow your body to rest and heal.  · Talk to your health care provider before going back to your normal activity level.  · Dont lift anything heavier than 10 pounds until your health care provider says its OK.  · Avoid long car rides.  · Avoid climbing stairs and strenuous exercise. Dont mow the lawn or use a vacuum .  · Take naps if you feel tired.  Home care  · Avoid constipation:  ¨ Eat fruits, vegetables, and whole grains.  ¨ Unless directed otherwise, drink 6 glasses to 8 glasses of water a day (enough to keep your urine light colored). This will also help keep a healthy flow of urine.  ¨ Use a laxative or a stool softener if your health care provider says its OK.  · Take care of your catheter. Ask for an information sheet and training before leaving the hospital:  ¨ Keep the catheter well secured.  ¨ Use either leg bags or external (straight drainage) bags, or both.  ¨ Empty your bag when its half full. You may notice some blood in the bag. This is normal after surgery and while the catheter is in place.  ¨ Use plain soap and water to wash the catheter and the head of your penis daily, or more often if needed.  · Return to your normal diet.  · Shower as usual.  · Be sure to finish the antibiotics that your doctor prescribed.  · Be sure to take pain medication if needed and as prescribed.  · Consider wearing sweat pants while you have the catheter. They may be more comfortable than other pants.  Follow-up  Make a follow-up appointment as directed by  our staff.  When to call your health care provider  Call your health care provider right away if you have any of the following:  · Fever above 100.4°F (38°C) or shaking chills  · Heavy bleeding, clots, or bright red blood from the catheter  · Catheter that falls out or stops draining  · Foul-smelling discharge from your catheter  · Redness, swelling, warmth, or pain at your incision site  · Drainage, pus, or bleeding from your incision  · Trouble breathing  · Hives or rash  · Nausea and vomiting  · Diarrhea   Date Last Reviewed: 9/24/2014  © 6764-7397 [employer]. [address] [place] [address], [place] [address]. All rights reserved. This information is not intended as a substitute for professional medical care. Always follow your healthcare professional's instructions.      Apple Catheter Care    A Apple catheter is a rubber tube that is placed through the urethra (opening where urine comes out) and into the bladder. This helps drain urine from the bladder. There is a small balloon on the end of the tube that is inflated after insertion. This keeps the catheter from sliding out of the bladder.  A Apple catheter is used to treat urinary retention (unable to pass urine). It is also used when there is incontinence (loss of bladder control).  Home care  · Finish taking any prescribed antibiotic even if you are feeling better before then.  · It is important to keep bacteria from getting into the collection bag. Do not disconnect the catheter from the collection bag.  · Use a leg band to secure the drainage tube, so it does not pull on the catheter. Drain the collection bag when it becomes full using the drain spout at the bottom of the bag.  · Do not try to pull or remove your catheter. This will injure your urethra. It must be removed by your healthcare provider or nurse.  Follow-up care  Follow up with your healthcare provider as advised for repeat urine testing and catheter removal or replacement.  When to  seek medical advice  Call your healthcare provider right away if any of these occur:  · Fever of 100.4ºF (38ºC) or higher, or as directed by your healthcare provider  · Bladder pain or fullness  · Abdominal swelling, nausea or vomiting, or back pain  · Blood or urine leakage around the catheter  · Bloody urine coming from the catheter (if a new symptom)  · Catheter falls out  · Catheter stops draining for 6 hours  · Weakness, dizziness, or fainting  Date Last Reviewed: 10/1/2016  © 6973-3485 [employer]. [address] [place] [address]. All rights reserved. This information is not intended as a substitute for professional medical care. Always follow your healthcare professional's instructions.      Discharge Instructions: Caring for Your Leg Bag  You are going home with a urinary catheter and collection device (drainage bag) in place. One type of collection device is called a leg bag. This is a smaller drainage bag that you can wear on your leg to collect urine during the day. The bag can fit under your clothing. You can move around with greater ease when using a leg bag instead of a larger collection bag.  You were shown how to care for your catheter in the hospital. This sheet will help you remember those steps when you are at home.  Home care  · Wash your hands thoroughly before and after you care for your catheter or collection device.  · Gather your supplies:  ¨ Alcohol wipes  ¨ Soap and water  ¨ Towel and washcloth  ¨ Leg strap and leg bag  · Use soap and water to wash the area where your catheter enters your body. Rinse well.  · Secure the bag bobo to your leg:  ¨ Position the leg band high on your thigh with the product label pointing away from your leg.  ¨ Stretch the leg band in place and fasten.  ¨ Place the catheter tubing over the bag and secure it. You may secure it with a Velcro tab or other method, depending on the product you use. Be sure to leave enough loop in the  catheter above the leg band to avoid pulling on the tube.  ¨ Every 4 to 6 hours, reposition the band to prevent pressure from the elastic on your leg. You can do this by changing the bag to the other leg or by raising or lowering the leg band.  ¨ Wash the band as frequently as needed. You can hand wash and dry the leg band.  · Place the bag in the bag bobo.  · Clean the urine bag end of the catheter and your catheter port with an alcohol wipe.  · Place a towel under the bag and port to keep urine from dripping onto your leg.  · Before connecting the outlet valve at the bottom of the bag to the catheter, make sure that it is firmly closed. Flip the valve upward toward the bag; it needs to snap firmly in place. Be sure not to tug on the tubing. Be gentle.  · Attach the urine bag to the end of the catheter; insert the connector snugly into the catheter port. You can avoid dribbling urine by bending the catheter tubing just below the tip and holding it while you disconnect it from the catheter. Be careful to keep the tip clean while connecting the leg bag tubing to the catheter--this keeps germs from getting into the system.  · Drain the bag when it is full. To drain the bag, flip the clamp downward. Direct the flexible outlet tube to control the flow of urine. You dont have to disconnect the leg bag from the catheter to empty it. Raise your leg up to the edge of the toilet to reach the leg bag. Then you can empty the bag directly into the toilet. This way, you wont need to bend over, which may be uncomfortable.  · Keep the leg bag clean. Rinse daily with equal parts water and vinegar to reduce odor and keep the bag free of germs.  · Remember to keep the drainage bag below the level of your bladder for proper drainage.  Follow-up  Make a follow-up appointment as directed by your healthcare provider.  When to call your healthcare provider  Call your healthcare provider immediately if you have any of the  following:  · Redness, swelling, or warmth around the catheter entry site  · Pus draining from your catheter entry site or into the catheter tubing and bag  · Blood, clots, or floating debris in the urine  · Nausea and vomiting  · Shaking chills  · Fever above 100.4°F (38°C)  · Pain that is not relieved by medication  · Catheter that falls out or is dislodged   Date Last Reviewed: 9/26/2014  © 3813-4273 [employer]. [address] [place] [address], [place] [address]. All rights reserved. This information is not intended as a substitute for professional medical care. Always follow your healthcare professional's instructions.

## 2017-08-18 NOTE — NURSING
Pt transported to [facility] via wheelchair accompanied by PCT for discharge to home. Pt has d/c paperwork with instructions and prescriptions in hand.

## 2017-08-18 NOTE — PLAN OF CARE
Problem: Patient Care Overview  Goal: Plan of Care Review  Outcome: Outcome(s) achieved Date Met: 08/17/17  Pt free from trauma, falls, and injury. Tolerated diet well, voiding marika urine to quintana, ambulated to bathroom, passed BM.  Removed IVs, reviewed discharge paperwork, no questions at this time.  Bed in low locked position, call light within reach, awaiting transport.

## 2017-08-21 ENCOUNTER — TELEPHONE (OUTPATIENT)
Dept: INTERNAL MEDICINE | Facility: CLINIC | Age: 67
End: 2017-08-21

## 2017-08-21 ENCOUNTER — PATIENT OUTREACH (OUTPATIENT)
Dept: ADMINISTRATIVE | Facility: CLINIC | Age: 67
End: 2017-08-21

## 2017-08-21 NOTE — PROGRESS NOTES
C3 nurse attempted to contact patient. No answer. The following message was left for the patient to return the call:  Good morning I am a nurse calling on behalf of [facility] from the [facility].  This is a Transitional Care Call for Bryan Queen . When you have a moment please contact us at [phone] or [phone] Monday through Friday, between the hours of 8 am to 4 pm. We look forward to speaking with you. On behalf of [facility] have a nice day.    The patient does not have a scheduled HOSFU appointment within 7-14 days post hospital discharge date 8\17\17. Message sent to Physician staff to assist with HOSFU appointment scheduling.

## 2017-08-22 ENCOUNTER — TELEPHONE (OUTPATIENT)
Dept: INTERNAL MEDICINE | Facility: CLINIC | Age: 67
End: 2017-08-22

## 2017-08-22 ENCOUNTER — TELEPHONE (OUTPATIENT)
Dept: UROLOGY | Facility: CLINIC | Age: 67
End: 2017-08-22

## 2017-08-22 NOTE — TELEPHONE ENCOUNTER
----- Message from Leidy Jean sent at 8/22/2017 10:24 AM CDT -----  Contact: Terri Queen  x_  1st Request  _  2nd Request  _  3rd Request        Who: Terri Queen    Why: Patient's wife would like to schedule post op appt for her  on this Friday 8/25. (Next available 9/19)    What Number to Call Back: [phone]    When to Expect a call back: (Before the end of the day)   -- if call after 3:00 call back will be tomorrow.

## 2017-08-25 ENCOUNTER — OFFICE VISIT (OUTPATIENT)
Dept: UROLOGY | Facility: CLINIC | Age: 67
End: 2017-08-25
Attending: UROLOGY
Payer: COMMERCIAL

## 2017-08-25 ENCOUNTER — TELEPHONE (OUTPATIENT)
Dept: PHARMACY | Facility: CLINIC | Age: 67
End: 2017-08-25

## 2017-08-25 VITALS
HEART RATE: 81 BPM | BODY MASS INDEX: 37.67 KG/M2 | WEIGHT: 240 LBS | HEIGHT: 67 IN | DIASTOLIC BLOOD PRESSURE: 70 MMHG | SYSTOLIC BLOOD PRESSURE: 150 MMHG

## 2017-08-25 DIAGNOSIS — C61 PROSTATE CANCER: Primary | ICD-10-CM

## 2017-08-25 PROCEDURE — 99024 POSTOP FOLLOW-UP VISIT: CPT | Mod: S$GLB,,, | Performed by: UROLOGY

## 2017-08-25 RX ORDER — SILDENAFIL CITRATE 20 MG/1
20 TABLET ORAL DAILY PRN
Qty: 30 TABLET | Refills: 11 | Status: SHIPPED | OUTPATIENT
Start: 2017-08-25 | End: 2017-09-12

## 2017-08-25 NOTE — PROGRESS NOTES
"Subjective:      Bryan Queen is a 66 y.o. male who returns today regarding his     No complaints    1 week post op    The following portions of the patient's history were reviewed and updated as appropriate: allergies, current medications, past family history, past medical history, past social history, past surgical history and problem list.    Review of Systems  Pertinent items are noted in HPI.  A comprehensive multipoint review of systems was negative except as otherwise stated in the HPI.     Objective:   Vitals: BP (!) 150/70   Pulse 81   Ht 5' 7" (1.702 m)   Wt 108.9 kg (240 lb)   BMI 37.59 kg/m²     Physical Exam   General: alert and oriented, no acute distress  Respiratory: Symmetric expansion, non-labored breathing  Cardiovascular: no peripheral edema  Abdomen: soft, non distended  Skin: normal coloration and turgor, no rashes, no suspicious skin lesions noted  Neuro: no gross deficits  Psych: normal judgment and insight, normal mood/affect and non-anxious    Physical Exam    Lab Review   Urinalysis demonstrates   Lab Results   Component Value Date    WBC 5.81 08/08/2017    HGB 13.4 (L) 08/08/2017    HCT 41.9 08/08/2017    MCV 92 08/08/2017     08/08/2017     Lab Results   Component Value Date    CREATININE 1.2 08/08/2017    BUN 22 08/08/2017     FINAL PATHOLOGIC DIAGNOSIS  1. FIBROADIPOSE TISSUE: NO EVIDENCE OF MALIGNANCY.  2. PROSTATE, SEMINAL VESICLES, AND VASA DEFERENTIA (RADICAL PROSTATECTOMY):  INVASIVE PROSTATIC ADENOCARCINOMA, ZACK 6 (3, 3) INVOLVING LEFT AND RIGHT LOBES; FOCAL  PERINEURAL INVASION; NO EXTRAPROSTATIC EXTENSION; RESECTION MARGINS FREE OF  MALIGNANCY;  RIGHT AND LEFT VAS DEFERENS AND SEMINAL VESICLE: NO EVIDENCE OF MALIGNANCY.    Imaging  -  Assessment and Plan:   Prostate cancer  [place] 6 [id number]  -     Prostate Specific Antigen, Diagnostic; Future; Expected date: 09/25/2017    Other orders  -     sildenafil (REVATIO) 20 mg Tab; Take 1 tablet (20 mg total) by mouth " daily as needed.  Dispense: 30 tablet; Refill: 11      Kegels    rtc 1 month with psa to see CONCEPCIÓN Lehman

## 2017-08-25 NOTE — PATIENT INSTRUCTIONS
Treating Urinary Incontinence in Men    You cant always control the release of urine. You may leak urine. Or you may not be able to hold your urine until you can get to a bathroom. This is called urinary incontinence. The problem can be managed. Talk to your doctor about your treatment options.  Taking medications  Prescription medications may help you. They may help:  · The sphincter to work better (this is the muscle that closes to keep urine from leaking out of the bladder)  · Stop the bladder from martina too often to push urine out  · The bladder muscles contract with more force  · Relax the sphincter muscle and allow urine to flow more freely  Making changes to your routine  Certain changes in your daily routine may help. These include:  · Avoiding caffeine and alcohol  · Using timed voiding (this is following a schedule for drinking fluids and urinating)  · Doing Kegel exercises daily (these exercises involve tightening the muscles in your sphincter and around your bladder to help strengthen them)  Using a catheter  A catheter is a narrow tube that is inserted through the urethra into the bladder. It drains urine. A condom catheter covers the penis. It channels urine into a collection bag. It is worn most of the time. Intermittent catheterization means inserting a catheter to drain the bladder, then removing it. This is done on a regular schedule.  Special therapies  · Biofeedback. This technique is taught by a nurse or physical therapist. During the therapy, a small sensor is placed inside or just outside the anus. Another sensor is placed on your stomach. these sensors read signals from the pelvic floor muscles. When you contract or relax your muscles, these signals are show as images on a computer screen. Using the images, you can learn to relax or contract certain muscles. This can help you better control these muscles. And, it can help you learn pelvic floor muscle exercises.  · Electrical  stimulation. This is a painless therapy that uses a tiny amount of electrical current. It helps strengthen very weak or damaged pelvic floor muscles. The electric current is sent through the muscles of the pelvic floor and bladder. This causes the muscles to contract. In time, this helps make the muscles stronger.  · Stimulator implants. This technique is used to treat urge incontinence. A small device is implanted under the skin near the stomach. This device gives off mild electrical signals. These block extra signals that are being sent to the bladder muscles. This helps the bladder work more normally.  Having surgery  If other options dont work, surgery may be recommended. If surgery is an option, your healthcare provider can discuss it with you and explain its risks and benefits.  Healing after prostate surgery  Surgery on the prostate gland can cause incontinence. Most often, the incontinence is only for a short time. It clears up when healing is complete. Very rarely, prostate surgery can result in permanent incontinence.   Date Last Reviewed: 9/17/2014  © 0438-1996 [employer]. [address] [place] [address], [place] [address]. All rights reserved. This information is not intended as a substitute for professional medical care. Always follow your healthcare professional's instructions.

## 2017-09-01 RX ORDER — SILDENAFIL CITRATE 20 MG/1
20 TABLET ORAL DAILY PRN
Qty: 30 TABLET | Refills: 11 | Status: CANCELLED | OUTPATIENT
Start: 2017-09-01

## 2017-09-12 DIAGNOSIS — N52.9 ERECTILE DYSFUNCTION, UNSPECIFIED ERECTILE DYSFUNCTION TYPE: Primary | ICD-10-CM

## 2017-09-12 RX ORDER — SILDENAFIL CITRATE 20 MG/1
20 TABLET ORAL DAILY PRN
Qty: 30 TABLET | Refills: 11 | Status: SHIPPED | OUTPATIENT
Start: 2017-09-12 | End: 2017-10-12

## 2017-09-14 RX ORDER — AMLODIPINE BESYLATE 5 MG/1
5 TABLET ORAL DAILY
Qty: 30 TABLET | Refills: 0 | OUTPATIENT
Start: 2017-09-14 | End: 2018-09-14

## 2017-09-19 ENCOUNTER — LAB VISIT (OUTPATIENT)
Dept: LAB | Facility: HOSPITAL | Age: 67
End: 2017-09-19
Attending: UROLOGY
Payer: COMMERCIAL

## 2017-09-19 DIAGNOSIS — C61 PROSTATE CANCER: ICD-10-CM

## 2017-09-19 LAB — COMPLEXED PSA SERPL-MCNC: <0.01 NG/ML

## 2017-09-19 PROCEDURE — 36415 COLL VENOUS BLD VENIPUNCTURE: CPT

## 2017-09-19 PROCEDURE — 84153 ASSAY OF PSA TOTAL: CPT

## 2017-09-25 ENCOUNTER — TELEPHONE (OUTPATIENT)
Dept: UROLOGY | Facility: CLINIC | Age: 67
End: 2017-09-25

## 2017-09-28 ENCOUNTER — OFFICE VISIT (OUTPATIENT)
Dept: UROLOGY | Facility: CLINIC | Age: 67
End: 2017-09-28
Payer: COMMERCIAL

## 2017-09-28 VITALS
HEIGHT: 67 IN | DIASTOLIC BLOOD PRESSURE: 71 MMHG | WEIGHT: 240.06 LBS | BODY MASS INDEX: 37.68 KG/M2 | SYSTOLIC BLOOD PRESSURE: 128 MMHG | HEART RATE: 79 BPM

## 2017-09-28 DIAGNOSIS — C61 MALIGNANT NEOPLASM OF PROSTATE: Primary | ICD-10-CM

## 2017-09-28 PROCEDURE — 99024 POSTOP FOLLOW-UP VISIT: CPT | Mod: S$GLB,,, | Performed by: NURSE PRACTITIONER

## 2017-09-28 RX ORDER — SILDENAFIL CITRATE 20 MG/1
20 TABLET ORAL DAILY PRN
Qty: 60 TABLET | Refills: 11 | Status: SHIPPED | OUTPATIENT
Start: 2017-09-28 | End: 2017-12-29 | Stop reason: SDUPTHER

## 2017-09-28 NOTE — PROGRESS NOTES
Subjective:      Bryan Queen is a 67 y.o. male who returns today regarding his prostate cancer.     The patient is 1 month s/p robotic prostatectomy for his prostate cancer ([place] 6 [id number]). PSA is undetectable. The patient continues to leak urine and is wearing about 2 depends per day. He has been doing kegel exercises but is concerned that he is not doing them correctly. No spontaneous erections. Has not taken viagra yet.    The following portions of the patient's history were reviewed and updated as appropriate: allergies, current medications, past family history, past medical history, past social history, past surgical history and problem list.    Review of Systems  A comprehensive multipoint review of systems was negative except as otherwise stated in the HPI.     Objective:   Vitals:   Vitals:    09/28/17 0926   BP: 128/71   Pulse: 79       Physical Exam   General: alert and oriented, no acute distress  Respiratory: Symmetric expansion, non-labored breathing  Cardiovascular: regular rate and rhythm, no peripheral edema  Abdomen: soft, non distended  Genitourinary: not done  not indicated  Rectal: not examined   Skin: normal coloration and turgor, no rashes, no suspicious skin lesions noted  Neuro: no gross deficits  Psych: normal judgment and insight, normal mood/affect and non-anxious    Lab Review   Urinalysis demonstrates: no sample today   Lab Results   Component Value Date    WBC 5.81 08/08/2017    HGB 13.4 (L) 08/08/2017    HCT 41.9 08/08/2017    MCV 92 08/08/2017     08/08/2017     Lab Results   Component Value Date    CREATININE 1.2 08/08/2017    BUN 22 08/08/2017     Lab Results   Component Value Date    PSA 4.6 (H) 12/21/2016    PSADIAG <0.01 09/19/2017       Imaging  None    Assessment and Plan:   Bryan was seen today for establish care.    Diagnoses and all orders for this visit:    Malignant neoplasm of prostate (Martinez 6 [id number])  -     sildenafil (REVATIO) 20 mg Tab; Take 1  tablet (20 mg total) by mouth daily as needed. Take 1-5 pills as needed for erections. Take on an empty stomach -1 hour before eating or 2 hours after eating.  -     Ambulatory Referral to Physical/Occupational Therapy  -     Prostate Specific Antigen, Diagnostic; Future    Plan:  --Referral to PT for kegel exercises  --Trial of revatio for ED  --Follow up with Dr. Evans in 3 months with PSA

## 2017-10-19 ENCOUNTER — TELEPHONE (OUTPATIENT)
Dept: PHYSICAL MEDICINE AND REHAB | Facility: CLINIC | Age: 67
End: 2017-10-19

## 2017-10-19 NOTE — TELEPHONE ENCOUNTER
----- Message from Nguyễn Bryson sent at 10/19/2017 12:12 PM CDT -----  Contact: Terri ( spouse ) [phone]  Caller is calling to schedule a NP appt, last seen by Dr. Stout, pt's wishes to remain at [place], pt needs a refill on (tamsulosin (FLOMAX) 0.4 mg Cp24 ) called into     [facility]/pharmacy #[id number] - [facility] [address]  [address] [address]  Phone: [phone] Fax: [phone]

## 2017-10-27 RX ORDER — TAMSULOSIN HYDROCHLORIDE 0.4 MG/1
CAPSULE ORAL
Qty: 90 CAPSULE | Refills: 1 | Status: SHIPPED | OUTPATIENT
Start: 2017-10-27 | End: 2017-12-29 | Stop reason: ALTCHOICE

## 2017-10-27 NOTE — TELEPHONE ENCOUNTER
----- Message from Christo Frankel sent at 10/27/2017  3:34 PM CDT -----  Contact: Terri (Wife) [phone]  Terri called to speak to someone regarding getting the patient scheduled and a prescription refilled. Please contact her to discuss further.

## 2017-12-27 ENCOUNTER — LAB VISIT (OUTPATIENT)
Dept: LAB | Facility: HOSPITAL | Age: 67
End: 2017-12-27
Attending: UROLOGY
Payer: COMMERCIAL

## 2017-12-27 DIAGNOSIS — C61 MALIGNANT NEOPLASM OF PROSTATE: ICD-10-CM

## 2017-12-27 PROCEDURE — 36415 COLL VENOUS BLD VENIPUNCTURE: CPT | Mod: PO

## 2017-12-27 PROCEDURE — 84153 ASSAY OF PSA TOTAL: CPT

## 2017-12-28 LAB — COMPLEXED PSA SERPL-MCNC: <0.01 NG/ML

## 2017-12-29 ENCOUNTER — OFFICE VISIT (OUTPATIENT)
Dept: UROLOGY | Facility: CLINIC | Age: 67
End: 2017-12-29
Attending: UROLOGY
Payer: COMMERCIAL

## 2017-12-29 VITALS
SYSTOLIC BLOOD PRESSURE: 137 MMHG | HEIGHT: 67 IN | HEART RATE: 84 BPM | WEIGHT: 240.06 LBS | BODY MASS INDEX: 37.68 KG/M2 | DIASTOLIC BLOOD PRESSURE: 67 MMHG

## 2017-12-29 DIAGNOSIS — C61 PROSTATE CANCER: Primary | ICD-10-CM

## 2017-12-29 DIAGNOSIS — N39.46 MIXED INCONTINENCE: ICD-10-CM

## 2017-12-29 DIAGNOSIS — C61 MALIGNANT NEOPLASM OF PROSTATE: ICD-10-CM

## 2017-12-29 DIAGNOSIS — N52.9 ERECTILE DYSFUNCTION, UNSPECIFIED ERECTILE DYSFUNCTION TYPE: ICD-10-CM

## 2017-12-29 PROCEDURE — 99214 OFFICE O/P EST MOD 30 MIN: CPT | Mod: S$GLB,,, | Performed by: UROLOGY

## 2017-12-29 RX ORDER — SILDENAFIL CITRATE 20 MG/1
20 TABLET ORAL DAILY PRN
Qty: 60 TABLET | Refills: 11 | Status: SHIPPED | OUTPATIENT
Start: 2017-12-29 | End: 2018-02-22

## 2017-12-29 RX ORDER — OXYBUTYNIN CHLORIDE 5 MG/1
5 TABLET, EXTENDED RELEASE ORAL DAILY
Qty: 30 TABLET | Refills: 11 | Status: SHIPPED | OUTPATIENT
Start: 2017-12-29 | End: 2021-05-18

## 2017-12-29 NOTE — PROGRESS NOTES
"Subjective:      Bryan Queen is a 67 y.o. male who returns today regarding his     4 mons post op      2-3 pads daily]  Not sure if he is doing kegels correctly  Interested in PT  Some oab symptoms    Minimal erections with sildenafil 100  Not firm enough for intercourse.    The following portions of the patient's history were reviewed and updated as appropriate: allergies, current medications, past family history, past medical history, past social history, past surgical history and problem list.    Review of Systems  Pertinent items are noted in HPI.  A comprehensive multipoint review of systems was negative except as otherwise stated in the HPI.     Objective:   Vitals: /67 (BP Location: Left arm, Patient Position: Sitting, BP Method: X-Large (Automatic))   Pulse 84   Ht 5' 7" (1.702 m)   Wt 108.9 kg (240 lb 1.3 oz)   BMI 37.60 kg/m²     Physical Exam   General: alert and oriented, no acute distress  Respiratory: Symmetric expansion, non-labored breathing  Cardiovascular: no peripheral edema  Abdomen: soft, non distended  Skin: normal coloration and turgor, no rashes, no suspicious skin lesions noted  Neuro: no gross deficits  Psych: normal judgment and insight, normal mood/affect and non-anxious    Physical Exam    Lab Review   Urinalysis demonstrates no specimen    Lab Results   Component Value Date    PSA 4.6 (H) 12/21/2016    PSA 1.29 02/27/2013    PSA 1.1 07/01/2011    PSADIAG <0.01 12/27/2017    PSADIAG <0.01 09/19/2017    PSADIAG 4.6 (H) 03/03/2017       Lab Results   Component Value Date    WBC 5.81 08/08/2017    HGB 13.4 (L) 08/08/2017    HCT 41.9 08/08/2017    MCV 92 08/08/2017     08/08/2017     Lab Results   Component Value Date    CREATININE 1.2 08/08/2017    BUN 22 08/08/2017       Imaging  -  Assessment and Plan:   Prostate cancer Martinez 6 [id number]  -     Prostate Specific Antigen, Diagnostic; Future; Expected date: 03/28/2018    Erectile dysfunction, unspecified erectile " dysfunction type    Malignant neoplasm of prostate  -     sildenafil, antihypertensive, (REVATIO) 20 mg Tab; Take 1 tablet (20 mg total) by mouth daily as needed. Take 1-5 pills as needed for erections. Take on an empty stomach -1 hour before eating or 2 hours after eating.  Dispense: 60 tablet; Refill: 11    Other orders  -     oxybutynin (DITROPAN-XL) 5 MG TR24; Take 1 tablet (5 mg total) by mouth once daily.  Dispense: 30 tablet; Refill: 11    Avoid pm fluids  Avoid caffeine and alcohol  Timed voiding    PT referrral  '    rtc 3 months

## 2018-01-29 ENCOUNTER — OFFICE VISIT (OUTPATIENT)
Dept: FAMILY MEDICINE | Facility: CLINIC | Age: 68
End: 2018-01-29
Payer: COMMERCIAL

## 2018-01-29 VITALS
DIASTOLIC BLOOD PRESSURE: 94 MMHG | HEART RATE: 72 BPM | TEMPERATURE: 98 F | OXYGEN SATURATION: 97 % | SYSTOLIC BLOOD PRESSURE: 160 MMHG | RESPIRATION RATE: 17 BRPM | HEIGHT: 67 IN | BODY MASS INDEX: 40.9 KG/M2 | WEIGHT: 260.56 LBS

## 2018-01-29 DIAGNOSIS — I10 ESSENTIAL HYPERTENSION: ICD-10-CM

## 2018-01-29 DIAGNOSIS — L30.9 DERMATITIS: ICD-10-CM

## 2018-01-29 DIAGNOSIS — Z85.46 HISTORY OF PROSTATE CANCER: ICD-10-CM

## 2018-01-29 DIAGNOSIS — Z23 NEED FOR INFLUENZA VACCINATION: ICD-10-CM

## 2018-01-29 DIAGNOSIS — Z23 NEED FOR PNEUMOCOCCAL VACCINATION: ICD-10-CM

## 2018-01-29 DIAGNOSIS — Z00.00 WELL ADULT EXAM: Primary | ICD-10-CM

## 2018-01-29 DIAGNOSIS — M54.17 LUMBOSACRAL NEURITIS: ICD-10-CM

## 2018-01-29 DIAGNOSIS — M17.0 PRIMARY OSTEOARTHRITIS OF BOTH KNEES: ICD-10-CM

## 2018-01-29 PROBLEM — C61 MALIGNANT NEOPLASM OF PROSTATE: Status: RESOLVED | Noted: 2017-08-16 | Resolved: 2018-01-29

## 2018-01-29 PROCEDURE — 90471 IMMUNIZATION ADMIN: CPT | Mod: S$GLB,,, | Performed by: FAMILY MEDICINE

## 2018-01-29 PROCEDURE — 90472 IMMUNIZATION ADMIN EACH ADD: CPT | Mod: S$GLB,,, | Performed by: FAMILY MEDICINE

## 2018-01-29 PROCEDURE — 90670 PCV13 VACCINE IM: CPT | Mod: S$GLB,,, | Performed by: FAMILY MEDICINE

## 2018-01-29 PROCEDURE — 99387 INIT PM E/M NEW PAT 65+ YRS: CPT | Mod: 25,S$GLB,, | Performed by: FAMILY MEDICINE

## 2018-01-29 PROCEDURE — 90662 IIV NO PRSV INCREASED AG IM: CPT | Mod: S$GLB,,, | Performed by: FAMILY MEDICINE

## 2018-01-29 PROCEDURE — 99999 PR PBB SHADOW E&M-EST. PATIENT-LVL III: CPT | Mod: PBBFAC,,, | Performed by: FAMILY MEDICINE

## 2018-01-29 RX ORDER — AMLODIPINE BESYLATE 10 MG/1
10 TABLET ORAL DAILY
Qty: 30 TABLET | Refills: 5 | Status: SHIPPED | OUTPATIENT
Start: 2018-01-29 | End: 2018-04-04 | Stop reason: SDUPTHER

## 2018-01-29 RX ORDER — FLUOCINONIDE 0.5 MG/G
OINTMENT TOPICAL 2 TIMES DAILY
Qty: 60 G | Refills: 2 | Status: SHIPPED | OUTPATIENT
Start: 2018-01-29 | End: 2018-02-08

## 2018-01-29 RX ORDER — PREDNISOLONE ACETATE 10 MG/ML
SUSPENSION/ DROPS OPHTHALMIC
COMMUNITY
Start: 2018-01-18 | End: 2019-06-24

## 2018-01-29 RX ORDER — CLOTRIMAZOLE 1 %
CREAM (GRAM) TOPICAL
Qty: 15 G | Refills: 2 | Status: SHIPPED | OUTPATIENT
Start: 2018-01-29 | End: 2021-05-18

## 2018-01-29 RX ORDER — NAPROXEN 500 MG/1
500 TABLET ORAL 2 TIMES DAILY WITH MEALS
Qty: 60 TABLET | Refills: 5 | Status: SHIPPED | OUTPATIENT
Start: 2018-01-29 | End: 2019-02-11

## 2018-01-29 NOTE — PROGRESS NOTES
Chief Complaint   Patient presents with    [facility]    Hypertension     discuss about medication        Bryan Queen is a 67 y.o. male who presents to [facility].  Chronic medical issues, if present, have been documented.  Acute medical issues, if present have been documented in the Chief Complaint.     Hypertension   This is a chronic problem. The current episode started more than 1 year ago. The problem is unchanged. The problem is uncontrolled. Associated symptoms include neck pain. Pertinent negatives include no anxiety, blurred vision, chest pain, headaches, malaise/fatigue, orthopnea, palpitations, peripheral edema, PND, shortness of breath or sweats. There are no associated agents to hypertension. Risk factors for coronary artery disease include male gender and obesity. Past treatments include calcium channel blockers. The current treatment provides moderate improvement. Compliance problems include exercise and diet.  There is no history of angina, kidney disease, CAD/MI, CVA, heart failure, left ventricular hypertrophy, PVD, renovascular disease or retinopathy. There is no history of chronic renal disease, coarctation of the aorta, hyperaldosteronism, hypercortisolism, hyperparathyroidism, a hypertension causing med, pheochromocytoma, sleep apnea or a thyroid problem.       ROS  Review of Systems   Constitutional: Negative for activity change, appetite change, chills, fatigue, fever and malaise/fatigue.   HENT: Negative for congestion, dental problem, drooling, ear discharge, ear pain, facial swelling, hearing loss, mouth sores, postnasal drip, rhinorrhea, sinus pressure, sore throat and trouble swallowing.    Eyes: Negative for blurred vision, pain, discharge, redness and visual disturbance.   Respiratory: Negative for cough, chest tightness and shortness of breath.    Cardiovascular: Negative for chest pain, palpitations, orthopnea and PND.   Gastrointestinal: Negative for abdominal pain,  "constipation, diarrhea, nausea and vomiting.   Genitourinary: Negative for difficulty urinating, dysuria, flank pain, frequency, penile pain, penile swelling, scrotal swelling, testicular pain and urgency.   Musculoskeletal: Positive for arthralgias (both knees), back pain and neck pain. Negative for gait problem, joint swelling, myalgias and neck stiffness.   Skin: Positive for rash (under arms).   Allergic/Immunologic: Negative for environmental allergies, food allergies and immunocompromised state.   Neurological: Negative.  Negative for dizziness, weakness, light-headedness and headaches.   Psychiatric/Behavioral: Negative.        Physical Exam  Vitals:    01/29/18 0959   BP: (!) 160/94   Pulse: 72   Resp: 17   Temp: 97.9 °F (36.6 °C)    Body mass index is 40.81 kg/m².  Weight: 118.2 kg (260 lb 9.3 oz)   Height: 5' 7" (170.2 cm)     Physical Exam   Constitutional: He is oriented to person, place, and time. He appears well-developed and well-nourished. He is active and cooperative.  Non-toxic appearance. He does not have a sickly appearance. He does not appear ill. No distress.   HENT:   Head: Normocephalic and atraumatic.   Right Ear: Hearing, tympanic membrane, external ear and ear canal normal. No tenderness. No foreign bodies. No mastoid tenderness. Tympanic membrane is not injected, not scarred, not perforated, not erythematous, not retracted and not bulging. No hemotympanum. No decreased hearing is noted.   Left Ear: Hearing, tympanic membrane, external ear and ear canal normal. No tenderness. No foreign bodies. No mastoid tenderness. Tympanic membrane is not injected, not scarred, not perforated, not erythematous, not retracted and not bulging. No hemotympanum. No decreased hearing is noted.   Nose: Nose normal. No sinus tenderness, nasal deformity or septal deviation. No epistaxis.  No foreign bodies.   Mouth/Throat: Uvula is midline, oropharynx is clear and moist and mucous membranes are normal. He does " not have dentures. Normal dentition. No uvula swelling or dental caries. No oropharyngeal exudate, posterior oropharyngeal edema, posterior oropharyngeal erythema or tonsillar abscesses.   Eyes: Conjunctivae, EOM and lids are normal. Pupils are equal, round, and reactive to light. Right eye exhibits no chemosis, no discharge, no exudate and no hordeolum. No foreign body present in the right eye. Left eye exhibits no chemosis, no discharge, no exudate and no hordeolum. No foreign body present in the left eye. No scleral icterus.   Neck: Normal range of motion and full passive range of motion without pain. Neck supple. No thyroid mass and no thyromegaly present.   Cardiovascular: Normal rate, regular rhythm, S1 normal, S2 normal and normal heart sounds.  Exam reveals no gallop and no friction rub.    No murmur heard.  Pulmonary/Chest: Effort normal and breath sounds normal. He has no decreased breath sounds. He has no wheezes. He has no rhonchi. He has no rales.   Abdominal: Soft. Normal appearance and bowel sounds are normal. He exhibits no distension, no ascites and no mass. There is no hepatosplenomegaly. There is no tenderness. There is no rigidity, no rebound and no guarding. No hernia.   Musculoskeletal:        Right knee: He exhibits normal range of motion, no swelling, no effusion, no ecchymosis, no deformity, no laceration, no erythema, normal alignment, no LCL laxity, normal patellar mobility, no bony tenderness, normal meniscus and no MCL laxity. Tenderness found. Medial joint line and lateral joint line tenderness noted. No MCL, no LCL and no patellar tendon tenderness noted.        Left knee: He exhibits normal range of motion, no swelling, no effusion, no ecchymosis, no deformity, no laceration, no erythema, normal alignment, no LCL laxity, no bony tenderness, normal meniscus and no MCL laxity. Tenderness found. Medial joint line and lateral joint line tenderness noted. No MCL, no LCL and no patellar  tendon tenderness noted.        Lumbar back: He exhibits decreased range of motion, tenderness and pain. He exhibits no bony tenderness, no swelling, no edema, no deformity, no laceration, no spasm and normal pulse.   Lymphadenopathy:        Head (right side): No submental, no submandibular, no preauricular and no posterior auricular adenopathy present.        Head (left side): No submental, no submandibular, no preauricular and no posterior auricular adenopathy present.     He has no cervical adenopathy.   Neurological: He is alert and oriented to person, place, and time. He has normal strength. He displays no atrophy and no tremor. No cranial nerve deficit or sensory deficit. He exhibits normal muscle tone. He displays no seizure activity.   Skin: Skin is warm, dry and intact. No rash noted. He is not diaphoretic. No pallor.   Psychiatric: He has a normal mood and affect. His speech is normal and behavior is normal. Judgment and thought content normal. Cognition and memory are normal. He is attentive.   Vitals reviewed.      Assessment & Plan    1. Well adult exam  Discussed age and gender appropriate screenings at this visit and encouraged a healthy diet with low saturated fats, and increased physical activity.  Counseled on medically appropriate vaccines based on age and current health condition.  Screening test will be ordered and once completed, patient will be notified of results when available.  If necessary, will follow up to discuss and manage further.   - CBC auto differential; Future  - Comprehensive metabolic panel; Future  - Lipid panel; Future    2. Essential hypertension  Patient was counseled and encouraged to maintain a low sodium diet, as well as increasing physical activity.  Recommend random BP checks at home on a regular basis.  Repeat BP at end of visit was not improved. Will continue medication at this time, and follow up in 4-6 weeks, or sooner if blood pressure does not improve over time.   Patient advised to return for nurse BP check in one week.   - amLODIPine (NORVASC) 10 MG tablet; Take 1 tablet (10 mg total) by mouth once daily.  Dispense: 30 tablet; Refill: 5    3. Lumbosacral neuritis  Managed with OTC naproxen; will increase dose and recommended BID use regularly.  - naproxen (EC NAPROSYN) 500 MG EC tablet; Take 1 tablet (500 mg total) by mouth 2 (two) times daily with meals.  Dispense: 60 tablet; Refill: 5    4. Primary osteoarthritis of both knees  S/P bilateral knee replacement; will manage pain with NSAID therapy.    5. Dermatitis  The current medical regimen is effective at this time and no changes to present plan or medications will be made at this visit.     - fluocinonide (LIDEX) 0.05 % ointment; Apply topically 2 (two) times daily. DO NOT USE ON FACE  Dispense: 60 g; Refill: 2  - clotrimazole (LOTRIMIN) 1 % cream; Apply to affected area 2 times daily  Dispense: 15 g; Refill: 2    6. History of prostate cancer  Managed by Urology; s/p TURP.  Patient encouraged to restart oxybutynin.      7. Need for pneumococcal vaccination  Patient due for pneumonia vaccine; Patient agreed and vaccine was administered in clinic today without complications.   - Pneumococcal Conjugate Vaccine (13 Valent) (IM)    8. Need for influenza vaccination  Patient requested Flu vaccine, and was consented and vaccine given in office without complication.   - Influenza - High Dose (65+) (PF) (IM)      Follow up documented    ACTIVE MEDICAL ISSUES:  Documented in Problem List    PAST MEDICAL HISTORY  Documented  .  PAST SURGICAL HISTORY:  Documented    SOCIAL HISTORY:  Documented    FAMILY HISTORY:  Documented    ALLERGIES AND MEDICATIONS: updated and reviewed.  Documented    Health Maintenance       Date Due Completion Date    TETANUS VACCINE 09/17/1968 ---    Zoster Vaccine 09/17/2010 ---    Pneumococcal (65+) (1 of 2 - PCV13) 09/17/2015 ---    Influenza Vaccine 08/01/2017 1/14/2016    Override on 1/14/2016: Done     Colonoscopy 09/13/2021 9/13/2016    Lipid Panel 12/21/2021 12/21/2016

## 2018-01-29 NOTE — PROGRESS NOTES
Pneumococcal 13 and Flu vaccine administered IM to left deltoid. VIS forms given. Tolerated well. No adverse reactions noted.

## 2018-02-02 ENCOUNTER — CLINICAL SUPPORT (OUTPATIENT)
Dept: FAMILY MEDICINE | Facility: CLINIC | Age: 68
End: 2018-02-02
Payer: COMMERCIAL

## 2018-02-02 ENCOUNTER — LAB VISIT (OUTPATIENT)
Dept: LAB | Facility: HOSPITAL | Age: 68
End: 2018-02-02
Attending: FAMILY MEDICINE
Payer: COMMERCIAL

## 2018-02-02 VITALS — SYSTOLIC BLOOD PRESSURE: 138 MMHG | DIASTOLIC BLOOD PRESSURE: 88 MMHG

## 2018-02-02 DIAGNOSIS — Z00.00 WELL ADULT EXAM: ICD-10-CM

## 2018-02-02 DIAGNOSIS — I10 ESSENTIAL HYPERTENSION: Primary | ICD-10-CM

## 2018-02-02 LAB
ALBUMIN SERPL BCP-MCNC: 3.6 G/DL
ALP SERPL-CCNC: 80 U/L
ALT SERPL W/O P-5'-P-CCNC: 20 U/L
ANION GAP SERPL CALC-SCNC: 7 MMOL/L
AST SERPL-CCNC: 16 U/L
BASOPHILS # BLD AUTO: 0.08 K/UL
BASOPHILS NFR BLD: 1.4 %
BILIRUB SERPL-MCNC: 0.4 MG/DL
BUN SERPL-MCNC: 20 MG/DL
CALCIUM SERPL-MCNC: 9.2 MG/DL
CHLORIDE SERPL-SCNC: 107 MMOL/L
CHOLEST SERPL-MCNC: 154 MG/DL
CHOLEST/HDLC SERPL: 4.2 {RATIO}
CO2 SERPL-SCNC: 26 MMOL/L
CREAT SERPL-MCNC: 1.4 MG/DL
DIFFERENTIAL METHOD: ABNORMAL
EOSINOPHIL # BLD AUTO: 0.2 K/UL
EOSINOPHIL NFR BLD: 4.2 %
ERYTHROCYTE [DISTWIDTH] IN BLOOD BY AUTOMATED COUNT: 14.3 %
EST. GFR  (AFRICAN AMERICAN): 59.7 ML/MIN/1.73 M^2
EST. GFR  (NON AFRICAN AMERICAN): 51.6 ML/MIN/1.73 M^2
GLUCOSE SERPL-MCNC: 107 MG/DL
HCT VFR BLD AUTO: 42.6 %
HDLC SERPL-MCNC: 37 MG/DL
HDLC SERPL: 24 %
HGB BLD-MCNC: 13.2 G/DL
IMM GRANULOCYTES # BLD AUTO: 0.01 K/UL
IMM GRANULOCYTES NFR BLD AUTO: 0.2 %
LDLC SERPL CALC-MCNC: 101.2 MG/DL
LYMPHOCYTES # BLD AUTO: 2.5 K/UL
LYMPHOCYTES NFR BLD: 43.3 %
MCH RBC QN AUTO: 28.6 PG
MCHC RBC AUTO-ENTMCNC: 31 G/DL
MCV RBC AUTO: 92 FL
MONOCYTES # BLD AUTO: 0.6 K/UL
MONOCYTES NFR BLD: 10.6 %
NEUTROPHILS # BLD AUTO: 2.3 K/UL
NEUTROPHILS NFR BLD: 40.3 %
NONHDLC SERPL-MCNC: 117 MG/DL
NRBC BLD-RTO: 0 /100 WBC
PLATELET # BLD AUTO: 345 K/UL
PMV BLD AUTO: 9.7 FL
POTASSIUM SERPL-SCNC: 4.6 MMOL/L
PROT SERPL-MCNC: 7.2 G/DL
RBC # BLD AUTO: 4.61 M/UL
SODIUM SERPL-SCNC: 140 MMOL/L
TRIGL SERPL-MCNC: 79 MG/DL
WBC # BLD AUTO: 5.75 K/UL

## 2018-02-02 PROCEDURE — 99499 UNLISTED E&M SERVICE: CPT | Mod: S$GLB,,, | Performed by: FAMILY MEDICINE

## 2018-02-02 PROCEDURE — 36415 COLL VENOUS BLD VENIPUNCTURE: CPT | Mod: PO

## 2018-02-02 PROCEDURE — 80061 LIPID PANEL: CPT

## 2018-02-02 PROCEDURE — 85025 COMPLETE CBC W/AUTO DIFF WBC: CPT

## 2018-02-02 PROCEDURE — 99999 PR PBB SHADOW E&M-EST. PATIENT-LVL II: CPT | Mod: PBBFAC,,,

## 2018-02-02 PROCEDURE — 80053 COMPREHEN METABOLIC PANEL: CPT

## 2018-02-02 NOTE — PROGRESS NOTES
Bryan Queen 67 y.o. male is here today for Blood Pressure check.   History of HTN yes.    Review of patient's allergies indicates:   Allergen Reactions    Hydroxyzine Nausea And Vomiting and Other (See Comments)     SWEATING    Lyrica [pregabalin] Rash    Mobic [meloxicam] Nausea And Vomiting     Tinnitus    Tramadol Nausea And Vomiting     Nauseous,Sweating, Ringing in the ears     Creatinine   Date Value Ref Range Status   08/08/2017 1.2 0.5 - 1.4 mg/dL Final     Sodium   Date Value Ref Range Status   08/08/2017 139 136 - 145 mmol/L Final     Potassium   Date Value Ref Range Status   08/08/2017 4.2 3.5 - 5.1 mmol/L Final   ]  Patient verifies taking blood pressure medications on a regular basis at the same time of the day.     Current Outpatient Prescriptions:     amLODIPine (NORVASC) 10 MG tablet, Take 1 tablet (10 mg total) by mouth once daily., Disp: 30 tablet, Rfl: 5    clotrimazole (LOTRIMIN) 1 % cream, Apply to affected area 2 times daily, Disp: 15 g, Rfl: 2    docusate sodium (COLACE) 100 MG capsule, Take 1 capsule (100 mg total) by mouth 2 (two) times daily as needed for Constipation., Disp: 60 capsule, Rfl: 1    duloxetine (CYMBALTA) 60 MG capsule, Take 1 capsule (60 mg total) by mouth once daily., Disp: 90 capsule, Rfl: 3    fluocinonide (LIDEX) 0.05 % ointment, Apply topically 2 (two) times daily. DO NOT USE ON FACE, Disp: 60 g, Rfl: 2    naproxen (EC NAPROSYN) 500 MG EC tablet, Take 1 tablet (500 mg total) by mouth 2 (two) times daily with meals., Disp: 60 tablet, Rfl: 5    OMEGA 3,6,9 COMBINATION NO.7 (OMEGA DHA ORAL), Take 1 capsule by mouth 2 (two) times daily., Disp: , Rfl:     oxybutynin (DITROPAN-XL) 5 MG TR24, Take 1 tablet (5 mg total) by mouth once daily., Disp: 30 tablet, Rfl: 11    prednisoLONE acetate (PRED FORTE) 1 % DrpS, , Disp: , Rfl:     sildenafil, antihypertensive, (REVATIO) 20 mg Tab, Take 1 tablet (20 mg total) by mouth daily as needed. Take 1-5 pills as needed for  erections. Take on an empty stomach -1 hour before eating or 2 hours after eating., Disp: 60 tablet, Rfl: 11  Does patient have record of home blood pressure readings no. Readings have been averaging no readings to report.   Last dose of blood pressure medication was taken at 9:30 am 2/1/18.  Patient is asymptomatic.   Complains of no complaints.    Vitals:    02/02/18 0904   BP: 138/88   BP Location: Left arm   Patient Position: Sitting   BP Method: Medium (Manual)         Dr. Lombard notified.

## 2018-02-07 ENCOUNTER — TELEPHONE (OUTPATIENT)
Dept: UROLOGY | Facility: CLINIC | Age: 68
End: 2018-02-07

## 2018-02-07 NOTE — TELEPHONE ENCOUNTER
----- Message from Bonnie Mccarthy sent at 2/7/2018 11:02 AM CST -----  _  1st Request  _  2nd Request  _  3rd Request        Who: [facility]    Why: Requesting a call back in regards to the status of the PA on the RX of sildenafil, antihypertensive, (REVATIO) 20 mg Tab    What Number to Call Back: [phone]    When to Expect a call back: (Within 24 hours)    Please return the call at earliest convenience. Thanks!

## 2018-02-07 NOTE — TELEPHONE ENCOUNTER
I spoke to pt.  He would like to see what insurance will cover at [facility] before he gets his medication from [facility].

## 2018-02-21 ENCOUNTER — TELEPHONE (OUTPATIENT)
Dept: UROLOGY | Facility: CLINIC | Age: 68
End: 2018-02-21

## 2018-02-21 DIAGNOSIS — N52.9 ERECTILE DYSFUNCTION, UNSPECIFIED ERECTILE DYSFUNCTION TYPE: Primary | ICD-10-CM

## 2018-02-21 NOTE — TELEPHONE ENCOUNTER
Spoke with pt , he stated that he did receive your message. Pt is requesting that you do call the [facility] into [employer].

## 2018-02-21 NOTE — TELEPHONE ENCOUNTER
----- Message from Estephania Tanner sent at 2/21/2018  4:41 PM CST -----  Contact: pt  x_  1st Request  _  2nd Request  _  3rd Request      Who:pt    Why: returning call back     What Number to Call Back: [phone]    When to Expect a call back: (Before the end of the day)   -- if call after 3:00 call back will be tomorrow.

## 2018-02-21 NOTE — TELEPHONE ENCOUNTER
----- Message from Petty Lincoln sent at 2/21/2018 11:26 AM CST -----  Contact: Pharmacist  X  1st Request  _  2nd Request  _  3rd Request                                                     Prior Authorization    Who: Bryan Queen (mrn# [id number])    Medication:   SILDENAFIL  20 mg         When to Expect a call back: (Before the end of the day)   -- if the call is after 12:00, the call back will be tomorrow.

## 2018-02-22 RX ORDER — SILDENAFIL CITRATE 20 MG/1
20 TABLET ORAL
Qty: 90 TABLET | Refills: 11 | Status: SHIPPED | OUTPATIENT
Start: 2018-02-22 | End: 2021-05-18

## 2018-02-22 NOTE — TELEPHONE ENCOUNTER
New rx for sildenafil [facility], insurance would not approve.

## 2018-04-04 DIAGNOSIS — I10 ESSENTIAL HYPERTENSION: ICD-10-CM

## 2018-04-04 NOTE — TELEPHONE ENCOUNTER
Amlodipine refill. Last refill 01/29/2018. LOV 01/29/2018    ----- Message from Rubia Zhang sent at 4/4/2018 11:03 AM CDT -----  Contact: [facility] pharmacy  REFILL: amLODIPine (NORVASC) 10 MG tablet (#90 DAY SUPPLY#)    PHARMACY: [facility]/PHARMACY #[id number] - [address]

## 2018-04-05 RX ORDER — AMLODIPINE BESYLATE 10 MG/1
10 TABLET ORAL DAILY
Qty: 30 TABLET | Refills: 5 | Status: SHIPPED | OUTPATIENT
Start: 2018-04-05 | End: 2018-05-18 | Stop reason: SDUPTHER

## 2018-05-18 ENCOUNTER — OFFICE VISIT (OUTPATIENT)
Dept: FAMILY MEDICINE | Facility: CLINIC | Age: 68
End: 2018-05-18
Payer: COMMERCIAL

## 2018-05-18 VITALS
RESPIRATION RATE: 18 BRPM | SYSTOLIC BLOOD PRESSURE: 130 MMHG | TEMPERATURE: 98 F | DIASTOLIC BLOOD PRESSURE: 82 MMHG | BODY MASS INDEX: 39.58 KG/M2 | HEART RATE: 74 BPM | OXYGEN SATURATION: 97 % | WEIGHT: 252.19 LBS | HEIGHT: 67 IN

## 2018-05-18 DIAGNOSIS — M54.17 LUMBOSACRAL NEURITIS: ICD-10-CM

## 2018-05-18 DIAGNOSIS — J30.9 ALLERGIC SINUSITIS: ICD-10-CM

## 2018-05-18 DIAGNOSIS — I10 ESSENTIAL HYPERTENSION: Primary | ICD-10-CM

## 2018-05-18 DIAGNOSIS — D49.2 SKIN GROWTH: ICD-10-CM

## 2018-05-18 PROCEDURE — 3075F SYST BP GE 130 - 139MM HG: CPT | Mod: CPTII,S$GLB,, | Performed by: FAMILY MEDICINE

## 2018-05-18 PROCEDURE — 99999 PR PBB SHADOW E&M-EST. PATIENT-LVL III: CPT | Mod: PBBFAC,,, | Performed by: FAMILY MEDICINE

## 2018-05-18 PROCEDURE — 3079F DIAST BP 80-89 MM HG: CPT | Mod: CPTII,S$GLB,, | Performed by: FAMILY MEDICINE

## 2018-05-18 PROCEDURE — 99214 OFFICE O/P EST MOD 30 MIN: CPT | Mod: S$GLB,,, | Performed by: FAMILY MEDICINE

## 2018-05-18 RX ORDER — DULOXETIN HYDROCHLORIDE 60 MG/1
60 CAPSULE, DELAYED RELEASE ORAL DAILY
Qty: 90 CAPSULE | Refills: 1 | Status: SHIPPED | OUTPATIENT
Start: 2018-05-18 | End: 2018-12-11 | Stop reason: SDUPTHER

## 2018-05-18 RX ORDER — LEVOCETIRIZINE DIHYDROCHLORIDE 5 MG/1
5 TABLET, FILM COATED ORAL NIGHTLY
Qty: 30 TABLET | Refills: 1 | Status: SHIPPED | OUTPATIENT
Start: 2018-05-18 | End: 2018-07-11 | Stop reason: SDUPTHER

## 2018-05-18 RX ORDER — AMLODIPINE BESYLATE 10 MG/1
10 TABLET ORAL DAILY
Qty: 90 TABLET | Refills: 1 | Status: SHIPPED | OUTPATIENT
Start: 2018-05-18 | End: 2019-05-23

## 2018-05-18 RX ORDER — FLUOCINONIDE 0.5 MG/G
OINTMENT TOPICAL
COMMUNITY
Start: 2018-03-30 | End: 2020-12-11 | Stop reason: SDUPTHER

## 2018-05-18 NOTE — PROGRESS NOTES
Chief Complaint   Patient presents with    Hypertension       Bryan Queen is a 67 y.o. male who presents per the Chief Complaint.  Pt is known to me and was last seen by me on 1/29/2018.  All known chronic medical issues have been documented.       Hypertension   This is a chronic problem. The current episode started more than 1 year ago. The problem is unchanged. The problem is controlled. Associated symptoms include neck pain (improved). Pertinent negatives include no anxiety, blurred vision, chest pain, headaches, malaise/fatigue, orthopnea, palpitations, peripheral edema, PND, shortness of breath or sweats. There are no associated agents to hypertension. Risk factors for coronary artery disease include male gender and obesity. Past treatments include calcium channel blockers. The current treatment provides moderate improvement. Compliance problems include exercise and diet.  There is no history of angina, kidney disease, CAD/MI, CVA, heart failure, left ventricular hypertrophy, PVD or retinopathy. There is no history of chronic renal disease, coarctation of the aorta, hyperaldosteronism, hypercortisolism, hyperparathyroidism, a hypertension causing med, pheochromocytoma, renovascular disease, sleep apnea or a thyroid problem.        ROS  Review of Systems   Constitutional: Positive for fatigue. Negative for activity change, appetite change, chills, diaphoresis, fever, malaise/fatigue and unexpected weight change.   HENT: Negative.  Negative for congestion, ear pain, hearing loss, nosebleeds, postnasal drip, rhinorrhea, sinus pressure, sneezing, sore throat and trouble swallowing.    Eyes: Negative for blurred vision, pain and visual disturbance.   Respiratory: Negative for cough, choking and shortness of breath.    Cardiovascular: Negative for chest pain, palpitations, orthopnea, leg swelling and PND.   Gastrointestinal: Negative for abdominal pain, constipation, diarrhea, nausea and vomiting.  "  Genitourinary: Negative for difficulty urinating, dysuria, frequency and urgency.   Musculoskeletal: Positive for neck pain (improved). Negative for arthralgias, back pain, gait problem, joint swelling and myalgias.   Skin: Negative.    Allergic/Immunologic: Negative for environmental allergies and food allergies.   Neurological: Negative.  Negative for dizziness, seizures, syncope, weakness, light-headedness and headaches.   Psychiatric/Behavioral: Negative.  Negative for confusion, decreased concentration, dysphoric mood and sleep disturbance. The patient is not nervous/anxious.        Physical Exam  Vitals:    05/18/18 1435   BP: 130/82   Pulse: 74   Resp: 18   Temp: 98.1 °F (36.7 °C)    Body mass index is 39.5 kg/m².  Weight: 114.4 kg (252 lb 3.3 oz)   Height: 5' 7" (170.2 cm)     Physical Exam   Constitutional: He is oriented to person, place, and time. He appears well-developed and well-nourished. He is active and cooperative.  Non-toxic appearance. He does not have a sickly appearance. He does not appear ill. No distress.   HENT:   Head: Normocephalic and atraumatic.       Right Ear: Hearing and external ear normal. No decreased hearing is noted.   Left Ear: Hearing and external ear normal. No decreased hearing is noted.   Nose: Nose normal. No rhinorrhea or nasal deformity.   Mouth/Throat: Uvula is midline and oropharynx is clear and moist. He does not have dentures. Normal dentition.   Eyes: Conjunctivae, EOM and lids are normal. Pupils are equal, round, and reactive to light. Right eye exhibits no chemosis, no discharge and no exudate. No foreign body present in the right eye. Left eye exhibits no chemosis, no discharge and no exudate. No foreign body present in the left eye. No scleral icterus.   Neck: Normal range of motion and full passive range of motion without pain. Neck supple.   Cardiovascular: Normal rate, regular rhythm, S1 normal, S2 normal and normal heart sounds.  Exam reveals no gallop and " no friction rub.    No murmur heard.  Pulmonary/Chest: Effort normal and breath sounds normal. No accessory muscle usage. No respiratory distress. He has no decreased breath sounds. He has no wheezes. He has no rhonchi. He has no rales.   Abdominal: Soft. Normal appearance. He exhibits no distension. There is no hepatosplenomegaly. There is no tenderness. There is no rigidity, no rebound and no guarding.   Musculoskeletal: Normal range of motion.        Cervical back: He exhibits tenderness and pain. He exhibits normal range of motion, no bony tenderness, no swelling, no edema, no deformity, no laceration, no spasm and normal pulse.   Neurological: He is alert and oriented to person, place, and time. He has normal strength. No cranial nerve deficit or sensory deficit. He exhibits normal muscle tone. He displays no seizure activity. Coordination and gait normal.   Skin: Skin is warm, dry and intact. No rash noted. He is not diaphoretic.   Psychiatric: He has a normal mood and affect. His speech is normal and behavior is normal. Judgment and thought content normal. Cognition and memory are normal. He is attentive.       Assessment & Plan    1. Essential hypertension  Patient was counseled and encouraged to maintain a low sodium diet, as well as increasing physical activity.  Recommend random BP checks at home on a regular basis.  Repeat BP at end of visit was not necessary. Will continue medication at this time, and follow up in 3-6 months, or sooner if blood pressure begins to increase.     - amLODIPine (NORVASC) 10 MG tablet; Take 1 tablet (10 mg total) by mouth once daily.  Dispense: 90 tablet; Refill: 1    2. Lumbosacral neuritis  The current medical regimen is effective at this time and no changes to present plan or medications will be made at this visit.  Pain has been improving.  - DULoxetine (CYMBALTA) 60 MG capsule; Take 1 capsule (60 mg total) by mouth once daily.  Dispense: 90 capsule; Refill: 1    3. Skin  growth  Lesion appears benign; possible inclusion cyst or hypertrophic skin.  Referral to appropriate specialist for evaluation and management placed in [facility].    - Ambulatory referral to Dermatology    4. Allergic sinusitis  Patient was advised to remain hydrated throughout illness and to use an antihistamine like loratadine or cetirizine daily.  I advised that a multi-symptom medication like Nyquil or Tylenol Cold and Flu or a sinus decongestant like Sudafed may raise blood pressure and should be used for no more than three days to alleviate symptoms.  If symptoms worsen, patient should follow up for further evaluation.   - levocetirizine (XYZAL) 5 MG tablet; Take 1 tablet (5 mg total) by mouth every evening.  Dispense: 30 tablet; Refill: 1      Follow up documented    ACTIVE MEDICAL ISSUES:  Documented in Problem List    PAST MEDICAL HISTORY  Documented    PAST SURGICAL HISTORY:  Documented    SOCIAL HISTORY:  Documented    FAMILY HISTORY:  Documented    ALLERGIES AND MEDICATIONS: updated and reviewed.  Documented    Health Maintenance       Date Due Completion Date    TETANUS VACCINE 09/17/1968 ---    Influenza Vaccine 08/01/2018 1/29/2018    Override on 1/14/2016: Done    Pneumococcal (65+) (2 of 2 - PPSV23) 01/29/2019 1/29/2018    Lipid Panel 02/02/2019 2/2/2018    Colonoscopy 09/13/2021 9/13/2016

## 2018-07-11 DIAGNOSIS — J30.9 ALLERGIC SINUSITIS: ICD-10-CM

## 2018-07-16 RX ORDER — LEVOCETIRIZINE DIHYDROCHLORIDE 5 MG/1
5 TABLET, FILM COATED ORAL NIGHTLY
Qty: 30 TABLET | Refills: 1 | Status: SHIPPED | OUTPATIENT
Start: 2018-07-16 | End: 2021-05-18

## 2018-07-17 ENCOUNTER — INITIAL CONSULT (OUTPATIENT)
Dept: DERMATOLOGY | Facility: CLINIC | Age: 68
End: 2018-07-17
Payer: COMMERCIAL

## 2018-07-17 DIAGNOSIS — D48.9 NEOPLASM OF UNCERTAIN BEHAVIOR: Primary | ICD-10-CM

## 2018-07-17 DIAGNOSIS — L91.8 SKIN TAG: ICD-10-CM

## 2018-07-17 PROCEDURE — 99999 PR PBB SHADOW E&M-EST. PATIENT-LVL III: CPT | Mod: PBBFAC,,, | Performed by: DERMATOLOGY

## 2018-07-17 PROCEDURE — 88305 TISSUE EXAM BY PATHOLOGIST: CPT | Performed by: PATHOLOGY

## 2018-07-17 PROCEDURE — 99202 OFFICE O/P NEW SF 15 MIN: CPT | Mod: 25,S$GLB,, | Performed by: DERMATOLOGY

## 2018-07-17 PROCEDURE — 11100 PR BIOPSY OF SKIN LESION: CPT | Mod: S$GLB,,, | Performed by: DERMATOLOGY

## 2018-07-17 NOTE — PROGRESS NOTES
Subjective:       Patient ID:  Bryan Queen is a 67 y.o. male who presents for   Chief Complaint   Patient presents with    Mole     forehead/uner eye      He also notes a skin tag next to his left eye that he would like looked at.         Mole  - Initial  Affected locations: forehead and face  Duration: 10 years  Signs / symptoms: irritated  Aggravated by: nothing  Relieving factors/Treatments tried: nothing        Review of Systems   Skin: Positive for wears hat. Negative for rash, daily sunscreen use and activity-related sunscreen use.   Hematologic/Lymphatic: Does not bruise/bleed easily.        Objective:    Physical Exam   Constitutional: He appears well-developed and well-nourished. No distress.   Neurological: He is alert and oriented to person, place, and time. He is not disoriented.   Psychiatric: He has a normal mood and affect.   Skin:   Areas Examined (abnormalities noted in diagram):   Scalp / Hair Palpated and Inspected  Head / Face Inspection Performed  Neck Inspection Performed  RUE Inspected  LUE Inspection Performed              Diagram Legend     Erythematous scaling macule/papule c/w actinic keratosis       Vascular papule c/w angioma      Pigmented verrucoid papule/plaque c/w seborrheic keratosis      Yellow umbilicated papule c/w sebaceous hyperplasia      Irregularly shaped tan macule c/w lentigo     1-2 mm smooth white papules consistent with Milia      Movable subcutaneous cyst with punctum c/w epidermal inclusion cyst      Subcutaneous movable cyst c/w pilar cyst      Firm pink to brown papule c/w dermatofibroma      Pedunculated fleshy papule(s) c/w skin tag(s)      Evenly pigmented macule c/w junctional nevus     Mildly variegated pigmented, slightly irregular-bordered macule c/w mildly atypical nevus      Flesh colored to evenly pigmented papule c/w intradermal nevus       Pink pearly papule/plaque c/w basal cell carcinoma      Erythematous hyperkeratotic cursted plaque c/w SCC       Surgical scar with no sign of skin cancer recurrence      Open and closed comedones      Inflammatory papules and pustules      Verrucoid papule consistent consistent with wart     Erythematous eczematous patches and plaques     Dystrophic onycholytic nail with subungual debris c/w onychomycosis     Umbilicated papule    Erythematous-base heme-crusted tan verrucoid plaque consistent with inflamed seborrheic keratosis     Erythematous Silvery Scaling Plaque c/w Psoriasis     See annotation          Assessment / Plan:      Pathology Orders:     Normal Orders This Visit    Tissue Specimen To Pathology, Dermatology     Questions:    Directional Terms:  Other(comment)    Clinical information:  exophytic shiny irritated pink papule irritated IDN vs BCC    Specific Site:  left upper forehead        Neoplasm of uncertain behavior - left upper forehead - traumatized nevus vs angioma vs BCC advise shave removal  Shave biopsy procedure note:    Shave biopsy performed after verbal consent including risk of infection, scar, recurrence, need for additional treatment of site. Area prepped with alcohol, anesthetized with approximately 1.0cc of 1% lidocaine with epinephrine. Lesional tissue shaved with razor blade. Hemostasis achieved with application of aluminum chloride followed by hyfrecation. No complications. Dressing applied. Wound care explained.      -     Tissue Specimen To Pathology, Dermatology    If biopsy positive for malignancy, refer to Dr. Reed for Mohs surgery consultation.    Skin tag  Discussed removal is cosmetic           Follow-up if symptoms worsen or fail to improve.

## 2018-07-17 NOTE — LETTER
July 17, 2018      Azikiwe K. Lombard, MD  [address] [facility] [address]           [facility]  [address]  [place] [address]  Phone: [phone]  Fax: [phone]          Patient: Bryan Queen   MR Number: [id number]   YOB: 1950   Date of Visit: 7/17/2018       Dear Dr. Azikiwe K. Lombard:    Thank you for referring Bryan Queen to me for evaluation. Attached you will find relevant portions of my assessment and plan of care.    If you have questions, please do not hesitate to call me. I look forward to following Bryan Queen along with you.    Sincerely,    Megan Torres MD    Enclosure  CC:  No Recipients    If you would like to receive this communication electronically, please contact [email or web address] or [phone] to request more information on [employer] Link access.    For providers and/or their staff who would like to refer a patient to [facility], please contact us through our one-stop-shop provider referral line, [facility], at [phone].    If you feel you have received this communication in error or would no longer like to receive these types of communications, please e-mail [email or web address]

## 2018-09-14 ENCOUNTER — OFFICE VISIT (OUTPATIENT)
Dept: FAMILY MEDICINE | Facility: CLINIC | Age: 68
End: 2018-09-14
Payer: COMMERCIAL

## 2018-09-14 VITALS
OXYGEN SATURATION: 96 % | WEIGHT: 264.31 LBS | HEIGHT: 67 IN | DIASTOLIC BLOOD PRESSURE: 82 MMHG | BODY MASS INDEX: 41.48 KG/M2 | TEMPERATURE: 98 F | RESPIRATION RATE: 16 BRPM | HEART RATE: 71 BPM | SYSTOLIC BLOOD PRESSURE: 138 MMHG

## 2018-09-14 DIAGNOSIS — L25.5 CONTACT DERMATITIS DUE TO PLANT: Primary | ICD-10-CM

## 2018-09-14 PROCEDURE — 99999 PR PBB SHADOW E&M-EST. PATIENT-LVL IV: CPT | Mod: PBBFAC,,, | Performed by: FAMILY MEDICINE

## 2018-09-14 PROCEDURE — 3079F DIAST BP 80-89 MM HG: CPT | Mod: CPTII,S$GLB,, | Performed by: FAMILY MEDICINE

## 2018-09-14 PROCEDURE — 99214 OFFICE O/P EST MOD 30 MIN: CPT | Mod: 25,S$GLB,, | Performed by: FAMILY MEDICINE

## 2018-09-14 PROCEDURE — 3075F SYST BP GE 130 - 139MM HG: CPT | Mod: CPTII,S$GLB,, | Performed by: FAMILY MEDICINE

## 2018-09-14 PROCEDURE — 1101F PT FALLS ASSESS-DOCD LE1/YR: CPT | Mod: CPTII,S$GLB,, | Performed by: FAMILY MEDICINE

## 2018-09-14 PROCEDURE — 96372 THER/PROPH/DIAG INJ SC/IM: CPT | Mod: S$GLB,,, | Performed by: FAMILY MEDICINE

## 2018-09-14 RX ORDER — TRIAMCINOLONE ACETONIDE 1 MG/G
OINTMENT TOPICAL 2 TIMES DAILY
Qty: 30 G | Refills: 1 | Status: SHIPPED | OUTPATIENT
Start: 2018-09-14 | End: 2018-09-24

## 2018-09-14 RX ORDER — PREDNISONE 10 MG/1
TABLET ORAL
Qty: 30 TABLET | Refills: 0 | Status: SHIPPED | OUTPATIENT
Start: 2018-09-15 | End: 2018-09-25

## 2018-09-14 NOTE — PROGRESS NOTES
Solumedrol was given IM in the right gluteus anibal. Tolerated well. Instructed to remain in the clinic for 15 mins after injection for observation

## 2018-09-17 NOTE — PROGRESS NOTES
Routine Office Visit    Patient Name: Bryan Queen    : 1950  MRN: [id number]    Subjective:  Bryan is a 67 y.o. male who presents today for:   Chief Complaint   Patient presents with    Poison Ivy     neck, chest & arms       67-year-old male comes in with report of poison ivy rash.  He reports that he was at his son's house helping clear some plans.  He states that he knows is poison ivy because he has had in the past.  The patient reports that the vesicles he has along with the itchiness appeared about 4 days after exposure.  There is no fever or chills, shortness of breath or wheezing, chest pains or palpitations.  The patient reports that the vesicles he has are very itchy and for the 1st couple a days a had a clear discharge. He reports there located in the areas a that came in contact with the plants he was removing.    Past Medical History  Past Medical History:   Diagnosis Date    Arthritis     BPH (benign prostatic hypertrophy)     Cancer     prostate    Colon polyps     Elevated PSA     Groin abscess     Hypertension     Joint pain     Lumbar radiculopathy     Obstructive sleep apnea (adult) (pediatric)     CPAP hs    PONV (postoperative nausea and vomiting)     Prostate cancer     Spinal stenosis        Past Surgical History  Past Surgical History:   Procedure Laterality Date    ARTHROPLASTY-KNEE Right 2015    [facility] 2ND FLR    BLOCK, NERVE, FACET JOINT, LUMBAR Bilateral 4/3/2014    [facility] PAIN MGT    CERVICAL FUSION  2009    C3-4 fusion    COLONOSCOPY N/A 2016    Procedure: COLONOSCOPY;  Surgeon: ROWENA Ahn MD;  Location: [facility] (4TH FLR);  Service: Endoscopy;  Laterality: N/A;  Patient requested the week of . Patient reports PONV.    COLONOSCOPY N/A 2016    Performed by R[facility]hn [facility] at [facility] ENDO (4TH FLR)    INJECTION-STEROID-EPIDURAL-LUMBAR N/A 2014    Performed  by [facility] at [facility] PAIN MGT    INJECTION-STEROID-EPIDURAL-LUMBAR N/A 5/22/2014    Per[facility] at [facility] PAIN MGT    INJECTION-STEROID-EPIDURAL-TRANSFORAMINAL Bilateral 11/4/2013    [facility]    KNEE SURGERY Left 4-13-15    TK    KNEE SURGERY Right 8-19-15    TK    LAMINECTOMY, SPINE, MINIMALLY INVASIVE Left 4/1/2013    Per[facility] at [facility] 2ND FLR    LUMBAR DISCECTOMY  12/3/2009    L5-S1 microdiscex    PROSTATECTOMY  08/16/2017    REPLACEMENT-KNEE-TOTAL Left 4/13/2015    Performed [facility] OR 2ND FLR    ROBOTIC ASSISTED LAPAROSCOPIC PROSTATECTOMY; lymphadenectomy N/A 8/16/2017    Performed by [facility]ieran Evans [facility] at [facility] OR    SPINE SURGERY  04/01/13    L4/5 laminectomy    TONSILLECTOMY          Family History  Family History   Problem Relation Age of Onset    Diabetes Mother     Hypertension Mother     Diabetes Sister     Diabetes Brother     Melanoma Neg Hx        Social History  Social History     Socioeconomic History    Marital status:      Spouse name: Not on file    Number of children: Not on file    Years of education: Not on file    Highest education level: Not on file   Social Needs    Financial resource strain: Not on file    Food insecurity - worry: Not on file    Food insecurity - inability: Not on file    Transportation needs - medical: Not on file    Transportation needs - non-medical: Not on file   Occupational History    Not on file   Tobacco Use    Smoking status: Never Smoker    Smokeless tobacco: Never Used   Substance and Sexual Activity    Alcohol use: Yes     Alcohol/week: 0.6 oz     Types: 1 Glasses of wine per week     Comment: occasional    Drug use: No    Sexual activity: Yes     Partners: Female   Other Topics Concern    Not on file   Social History Narrative    Not on file       Current Medications  Current Outpatient Medications on File  "Prior to Visit   Medication Sig Dispense Refill    amLODIPine (NORVASC) 10 MG tablet Take 1 tablet (10 mg total) by mouth once daily. 90 tablet 1    clotrimazole (LOTRIMIN) 1 % cream Apply to affected area 2 times daily 15 g 2    docusate sodium (COLACE) 100 MG capsule Take 1 capsule (100 mg total) by mouth 2 (two) times daily as needed for Constipation. 60 capsule 1    DULoxetine (CYMBALTA) 60 MG capsule Take 1 capsule (60 mg total) by mouth once daily. 90 capsule 1    fluocinonide (LIDEX) 0.05 % ointment       levocetirizine (XYZAL) 5 MG tablet Take 1 tablet (5 mg total) by mouth every evening. 30 tablet 1    naproxen (EC NAPROSYN) 500 MG EC tablet Take 1 tablet (500 mg total) by mouth 2 (two) times daily with meals. 60 tablet 5    OMEGA 3,6,9 COMBINATION NO.7 (OMEGA DHA ORAL) Take 1 capsule by mouth 2 (two) times daily.      oxybutynin (DITROPAN-XL) 5 MG TR24 Take 1 tablet (5 mg total) by mouth once daily. 30 tablet 11    sildenafil, antihypertensive, (REVATIO) 20 mg Tab Take 1 tablet (20 mg total) by mouth as needed (Take 2 to 5 tablets as needed for activity). Disregard."take one tablet" 90 tablet 11    prednisoLONE acetate (PRED FORTE) 1 % DrpS        No current facility-administered medications on file prior to visit.        Allergies   Review of patient's allergies indicates:   Allergen Reactions    Hydroxyzine Nausea And Vomiting and Other (See Comments)     SWEATING    Lyrica [pregabalin] Rash    Mobic [meloxicam] Nausea And Vomiting     Tinnitus    Tramadol Nausea And Vomiting     Nauseous,Sweating, Ringing in the ears       Review of Systems   Constitutional: Negative for chills and fever.   Respiratory: Negative for cough, shortness of breath and wheezing.    Cardiovascular: Negative for chest pain and palpitations.   Skin: Positive for rash.         /82 (BP Location: Left arm, Patient Position: Sitting, BP Method: Medium (Manual))   Pulse 71   Temp 97.9 °F (36.6 °C) (Oral)   " "Resp 16   Ht 5' 7" (1.702 m)   Wt 119.9 kg (264 lb 5.3 oz)   SpO2 96%   BMI 41.40 kg/m²     Physical Exam   Constitutional: He appears well-developed. No distress.   HENT:   Mouth/Throat: Uvula is midline and oropharynx is clear and moist. No posterior oropharyngeal erythema.   Cardiovascular: Normal rate, regular rhythm, normal heart sounds and intact distal pulses.   Pulmonary/Chest: Effort normal and breath sounds normal. No stridor. No respiratory distress. He has no wheezes.   Skin:   See images                   Assessment/Plan:  Bryan was seen today for poison ivy.  He is an established patient, however, new to me with an acute concern    Diagnoses and all orders for this visit:    Contact dermatitis due to plant  -     methylPREDNISolone sod suc(PF) injection 125 mg; Inject 125 mg into the muscle one time.  -     triamcinolone acetonide 0.1% (KENALOG) 0.1 % ointment; Apply topically 2 (two) times daily. for 10 days  -     predniSONE (DELTASONE) 10 mg tablet pack; Take 5 tablets (50 mg total) by mouth once daily for 2 days, THEN 4 tablets (40 mg total) once daily for 2 days, THEN 3 tablets (30 mg total) once daily for 2 days, THEN 2 tablets (20 mg total) once daily for 2 days, THEN 1 tablet (10 mg total) once daily for 2 days.     Patient given an intramuscular steroid dose in the office today.  Starting tomorrow he is to continue the prednisone taper he was prescribed.  The he is to be re-evaluated a next week.  A topical steroid was given to him a for severe symptoms only.        This office note has been dictated.  This dictation has been generated using M[employer] Fluency Direct dictation; some phonetic errors may occur.       "

## 2018-12-11 DIAGNOSIS — M54.17 LUMBOSACRAL NEURITIS: ICD-10-CM

## 2018-12-11 RX ORDER — DULOXETIN HYDROCHLORIDE 60 MG/1
60 CAPSULE, DELAYED RELEASE ORAL DAILY
Qty: 90 CAPSULE | Refills: 1 | Status: SHIPPED | OUTPATIENT
Start: 2018-12-11 | End: 2018-12-27 | Stop reason: SDUPTHER

## 2018-12-27 DIAGNOSIS — M54.17 LUMBOSACRAL NEURITIS: ICD-10-CM

## 2018-12-28 RX ORDER — DULOXETIN HYDROCHLORIDE 60 MG/1
60 CAPSULE, DELAYED RELEASE ORAL DAILY
Qty: 90 CAPSULE | Refills: 1 | Status: SHIPPED | OUTPATIENT
Start: 2018-12-28 | End: 2019-05-23 | Stop reason: SDUPTHER

## 2019-01-11 ENCOUNTER — HOSPITAL ENCOUNTER (OUTPATIENT)
Dept: RADIOLOGY | Facility: HOSPITAL | Age: 69
Discharge: HOME | End: 2019-01-11
Attending: FAMILY MEDICINE
Payer: COMMERCIAL

## 2019-01-11 ENCOUNTER — OFFICE VISIT (OUTPATIENT)
Dept: FAMILY MEDICINE | Facility: CLINIC | Age: 69
End: 2019-01-11
Payer: COMMERCIAL

## 2019-01-11 VITALS
RESPIRATION RATE: 18 BRPM | TEMPERATURE: 99 F | DIASTOLIC BLOOD PRESSURE: 96 MMHG | HEIGHT: 67 IN | OXYGEN SATURATION: 95 % | WEIGHT: 260.38 LBS | SYSTOLIC BLOOD PRESSURE: 176 MMHG | BODY MASS INDEX: 40.87 KG/M2 | HEART RATE: 78 BPM

## 2019-01-11 DIAGNOSIS — M54.12 CERVICAL MYELOPATHY WITH CERVICAL RADICULOPATHY: ICD-10-CM

## 2019-01-11 DIAGNOSIS — M47.26 OSTEOARTHRITIS OF SPINE WITH RADICULOPATHY, LUMBAR REGION: ICD-10-CM

## 2019-01-11 DIAGNOSIS — I10 ESSENTIAL HYPERTENSION: Primary | ICD-10-CM

## 2019-01-11 DIAGNOSIS — Z23 NEED FOR INFLUENZA VACCINATION: ICD-10-CM

## 2019-01-11 DIAGNOSIS — M54.17 LUMBOSACRAL NEURITIS: ICD-10-CM

## 2019-01-11 DIAGNOSIS — G95.9 CERVICAL MYELOPATHY WITH CERVICAL RADICULOPATHY: ICD-10-CM

## 2019-01-11 DIAGNOSIS — Z00.00 WELL ADULT EXAM: ICD-10-CM

## 2019-01-11 PROCEDURE — 99999 PR PBB SHADOW E&M-EST. PATIENT-LVL III: CPT | Mod: PBBFAC,,, | Performed by: FAMILY MEDICINE

## 2019-01-11 PROCEDURE — 3288F FALL RISK ASSESSMENT DOCD: CPT | Mod: CPTII,S$GLB,, | Performed by: FAMILY MEDICINE

## 2019-01-11 PROCEDURE — 72100 X-RAY EXAM L-S SPINE 2/3 VWS: CPT | Mod: TC,FY,PO

## 2019-01-11 PROCEDURE — 99999 PR PBB SHADOW E&M-EST. PATIENT-LVL III: ICD-10-PCS | Mod: PBBFAC,,, | Performed by: FAMILY MEDICINE

## 2019-01-11 PROCEDURE — 99214 PR OFFICE/OUTPT VISIT, EST, LEVL IV, 30-39 MIN: ICD-10-PCS | Mod: 25,S$GLB,, | Performed by: FAMILY MEDICINE

## 2019-01-11 PROCEDURE — 72040 XR CERVICAL SPINE AP LATERAL: ICD-10-PCS | Mod: 26,,, | Performed by: RADIOLOGY

## 2019-01-11 PROCEDURE — 90662 IIV NO PRSV INCREASED AG IM: CPT | Mod: S$GLB,,, | Performed by: FAMILY MEDICINE

## 2019-01-11 PROCEDURE — 90471 FLU VACCINE - HIGH DOSE (65+) PRESERVATIVE FREE IM: ICD-10-PCS | Mod: S$GLB,,, | Performed by: FAMILY MEDICINE

## 2019-01-11 PROCEDURE — 1100F PTFALLS ASSESS-DOCD GE2>/YR: CPT | Mod: CPTII,S$GLB,, | Performed by: FAMILY MEDICINE

## 2019-01-11 PROCEDURE — 3080F DIAST BP >= 90 MM HG: CPT | Mod: CPTII,S$GLB,, | Performed by: FAMILY MEDICINE

## 2019-01-11 PROCEDURE — 90662 FLU VACCINE - HIGH DOSE (65+) PRESERVATIVE FREE IM: ICD-10-PCS | Mod: S$GLB,,, | Performed by: FAMILY MEDICINE

## 2019-01-11 PROCEDURE — 3077F SYST BP >= 140 MM HG: CPT | Mod: CPTII,S$GLB,, | Performed by: FAMILY MEDICINE

## 2019-01-11 PROCEDURE — 72100 XR LUMBAR SPINE AP AND LATERAL: ICD-10-PCS | Mod: 26,,, | Performed by: RADIOLOGY

## 2019-01-11 PROCEDURE — 3077F PR MOST RECENT SYSTOLIC BLOOD PRESSURE >= 140 MM HG: ICD-10-PCS | Mod: CPTII,S$GLB,, | Performed by: FAMILY MEDICINE

## 2019-01-11 PROCEDURE — 99214 OFFICE O/P EST MOD 30 MIN: CPT | Mod: 25,S$GLB,, | Performed by: FAMILY MEDICINE

## 2019-01-11 PROCEDURE — 90471 IMMUNIZATION ADMIN: CPT | Mod: S$GLB,,, | Performed by: FAMILY MEDICINE

## 2019-01-11 PROCEDURE — 1100F PR PT FALLS ASSESS DOC 2+ FALLS/FALL W/INJURY/YR: ICD-10-PCS | Mod: CPTII,S$GLB,, | Performed by: FAMILY MEDICINE

## 2019-01-11 PROCEDURE — 72040 X-RAY EXAM NECK SPINE 2-3 VW: CPT | Mod: TC,FY,PO

## 2019-01-11 PROCEDURE — 72040 X-RAY EXAM NECK SPINE 2-3 VW: CPT | Mod: 26,,, | Performed by: RADIOLOGY

## 2019-01-11 PROCEDURE — 72100 X-RAY EXAM L-S SPINE 2/3 VWS: CPT | Mod: 26,,, | Performed by: RADIOLOGY

## 2019-01-11 PROCEDURE — 3080F PR MOST RECENT DIASTOLIC BLOOD PRESSURE >= 90 MM HG: ICD-10-PCS | Mod: CPTII,S$GLB,, | Performed by: FAMILY MEDICINE

## 2019-01-11 PROCEDURE — 3288F PR FALLS RISK ASSESSMENT DOCUMENTED: ICD-10-PCS | Mod: CPTII,S$GLB,, | Performed by: FAMILY MEDICINE

## 2019-01-11 RX ORDER — GABAPENTIN 600 MG/1
600 TABLET ORAL 3 TIMES DAILY
Qty: 90 TABLET | Refills: 5 | Status: SHIPPED | OUTPATIENT
Start: 2019-01-11 | End: 2019-12-09 | Stop reason: SDUPTHER

## 2019-01-11 NOTE — PROGRESS NOTES
Chief Complaint   Patient presents with    Leg Pain     both legs     rt arm and shoulder pain     rt wrist pain    Tingling    Back Pain    Neck Pain       Bryan Queen is a 68 y.o. male who presents per the Chief Complaint.  Pt is known to me and was last seen by me on 5/18/2018.  All known chronic medical issues have been documented.       Leg Pain    The incident occurred more than 1 week ago. There was no injury mechanism. The pain is present in the left leg, left hip and left thigh. The quality of the pain is described as burning and stabbing. The pain is at a severity of 5/10. The pain is moderate. The pain has been fluctuating since onset. Pertinent negatives include no inability to bear weight, loss of motion, loss of sensation, muscle weakness, numbness or tingling. He reports no foreign bodies present.   Back Pain   This is a chronic problem. The current episode started more than 1 year ago. Associated symptoms include leg pain. Pertinent negatives include no abdominal pain, chest pain, dysuria, fever, headaches, numbness, paresis, tingling, weakness or weight loss.   Neck Pain    This is a new problem. The current episode started 1 to 4 weeks ago. The problem occurs daily. The problem has been gradually worsening. The pain is associated with nothing. The pain is present in the right side. The quality of the pain is described as aching. The pain is at a severity of 5/10. The pain is moderate. The symptoms are aggravated by position. The pain is worse during the day. Associated symptoms include leg pain. Pertinent negatives include no chest pain, fever, headaches, numbness, pain with swallowing, paresis, photophobia, syncope, tingling, trouble swallowing, visual change, weakness or weight loss.   Hypertension   This is a chronic problem. The current episode started more than 1 year ago. The problem has been gradually worsening since onset. The problem is uncontrolled. Associated symptoms include neck  pain. Pertinent negatives include no anxiety, blurred vision, chest pain, headaches, malaise/fatigue, orthopnea, palpitations, peripheral edema, PND, shortness of breath or sweats. There are no associated agents to hypertension. Risk factors for coronary artery disease include male gender and obesity. Past treatments include calcium channel blockers. The current treatment provides moderate improvement. Compliance problems include exercise and diet.  There is no history of angina, kidney disease, CAD/MI, CVA, heart failure, left ventricular hypertrophy, PVD or retinopathy. There is no history of chronic renal disease, coarctation of the aorta, hyperaldosteronism, hypercortisolism, hyperparathyroidism, a hypertension causing med, pheochromocytoma, renovascular disease, sleep apnea or a thyroid problem.   Arm Pain    The incident occurred more than 1 week ago. There was no injury mechanism. The pain is present in the upper right arm, right shoulder, right hand, right forearm and right wrist. The quality of the pain is described as shooting, stabbing and aching. The pain radiates to the right neck. The pain is at a severity of 5/10. The pain is moderate. The pain has been fluctuating since the incident. Pertinent negatives include no chest pain, muscle weakness, numbness or tingling. The symptoms are aggravated by movement.        ROS  Review of Systems   Constitutional: Negative for activity change, appetite change, chills, diaphoresis, fatigue, fever, malaise/fatigue, unexpected weight change and weight loss.   HENT: Negative.  Negative for congestion, ear pain, hearing loss, nosebleeds, postnasal drip, rhinorrhea, sinus pressure, sneezing, sore throat and trouble swallowing.    Eyes: Negative for blurred vision, photophobia, pain and visual disturbance.   Respiratory: Negative for cough, choking and shortness of breath.    Cardiovascular: Negative for chest pain, palpitations, orthopnea, leg swelling, syncope and  "PND.   Gastrointestinal: Negative for abdominal pain, constipation, diarrhea, nausea and vomiting.   Genitourinary: Negative for difficulty urinating, dysuria, frequency and urgency.   Musculoskeletal: Positive for arthralgias, back pain and neck pain. Negative for gait problem, joint swelling and myalgias.   Skin: Negative.    Allergic/Immunologic: Negative for environmental allergies and food allergies.   Neurological: Negative.  Negative for dizziness, tingling, seizures, syncope, weakness, light-headedness, numbness and headaches.   Psychiatric/Behavioral: Negative.  Negative for confusion, decreased concentration, dysphoric mood and sleep disturbance. The patient is not nervous/anxious.        Physical Exam  Vitals:    01/11/19 0957   BP: (!) 176/96   Pulse: 78   Resp: 18   Temp: 98.6 °F (37 °C)    Body mass index is 40.78 kg/m².  Weight: 118.1 kg (260 lb 5.8 oz)   Height: 5' 7" (170.2 cm)     Physical Exam   Constitutional: He is oriented to person, place, and time. He appears well-developed and well-nourished. He is active and cooperative.  Non-toxic appearance. He does not have a sickly appearance. He does not appear ill. No distress.   HENT:   Head: Normocephalic and atraumatic.   Right Ear: Hearing and external ear normal. No decreased hearing is noted.   Left Ear: Hearing and external ear normal. No decreased hearing is noted.   Nose: Nose normal. No rhinorrhea or nasal deformity.   Mouth/Throat: Uvula is midline and oropharynx is clear and moist. He does not have dentures. Normal dentition.   Eyes: Conjunctivae, EOM and lids are normal. Pupils are equal, round, and reactive to light. Right eye exhibits no chemosis, no discharge and no exudate. No foreign body present in the right eye. Left eye exhibits no chemosis, no discharge and no exudate. No foreign body present in the left eye. No scleral icterus.   Neck: Normal range of motion and full passive range of motion without pain. Neck supple. "   Cardiovascular: Normal rate, regular rhythm, S1 normal, S2 normal and normal heart sounds. Exam reveals no gallop and no friction rub.   No murmur heard.  Pulmonary/Chest: Effort normal and breath sounds normal. No accessory muscle usage. No respiratory distress. He has no decreased breath sounds. He has no wheezes. He has no rhonchi. He has no rales.   Abdominal: Soft. Normal appearance. He exhibits no distension. There is no hepatosplenomegaly. There is no tenderness. There is no rigidity, no rebound and no guarding.   Musculoskeletal:        Right shoulder: He exhibits tenderness and pain. He exhibits normal range of motion, no bony tenderness, no swelling, no effusion, no crepitus, no deformity, no laceration, no spasm, normal pulse and normal strength.        Right wrist: He exhibits decreased range of motion and tenderness. He exhibits no bony tenderness, no swelling, no effusion, no crepitus, no deformity and no laceration.        Cervical back: He exhibits tenderness and pain. He exhibits normal range of motion, no bony tenderness, no swelling, no edema, no deformity, no laceration, no spasm and normal pulse.        Lumbar back: He exhibits decreased range of motion, tenderness and pain. He exhibits no bony tenderness, no swelling, no edema, no deformity, no laceration, no spasm and normal pulse.        Left upper leg: He exhibits tenderness. He exhibits no bony tenderness, no swelling, no edema, no deformity and no laceration.   Neurological: He is alert and oriented to person, place, and time. He has normal strength. No cranial nerve deficit or sensory deficit. He exhibits normal muscle tone. He displays no seizure activity. Coordination and gait normal.   Skin: Skin is warm, dry and intact. No rash noted. He is not diaphoretic.   Psychiatric: He has a normal mood and affect. His speech is normal and behavior is normal. Judgment and thought content normal. Cognition and memory are normal. He is  attentive.       Assessment & Plan    1. Essential hypertension  Patient stopped medication over a month ago.  Patient was counseled and encouraged to maintain a low sodium diet, as well as increasing physical activity.  Recommend random BP checks at home on a regular basis.  Repeat BP at end of visit was not improved. Will continue medication at this time, and follow up in 4-6 weeks, or sooner if blood pressure does not improve over time.  Patient advised to return for nurse BP check in one week.     2. Lumbosacral neuritis  Will wean duloxetine and restart gabapentin due to worsening of symptoms.  Will order imaging to further evaluate and manage accordingly.   - gabapentin (NEURONTIN) 600 MG tablet; Take 1 tablet (600 mg total) by mouth 3 (three) times daily. 1 Tablet Oral Three times a day  Dispense: 90 tablet; Refill: 5  - X-Ray Lumbar Spine AP And Lateral; Future    3. Cervical myelopathy with cervical radiculopathy  Will order imaging to further evaluate and manage accordingly.  Restart gabapentin.  - X-Ray Cervical Spine AP And Lateral; Future  - gabapentin (NEURONTIN) 600 MG tablet; Take 1 tablet (600 mg total) by mouth 3 (three) times daily. 1 Tablet Oral Three times a day  Dispense: 90 tablet; Refill: 5    4. Osteoarthritis of spine with radiculopathy, lumbar region  Will order imaging to further evaluate and manage accordingly.   - gabapentin (NEURONTIN) 600 MG tablet; Take 1 tablet (600 mg total) by mouth 3 (three) times daily. 1 Tablet Oral Three times a day  Dispense: 90 tablet; Refill: 5  - X-Ray Lumbar Spine AP And Lateral; Future    5. Well adult exam  Screening test will be ordered and once results available patient will be notified of results and managed accordingly.    - Comprehensive metabolic panel; Future  - CBC auto differential; Future  - Lipid panel; Future  - PSA, Screening; Future    6. Need for influenza vaccination  Patient requested Flu vaccine, and was consented and vaccine given in  office without complication.   - Influenza - High Dose (65+) (PF) (IM)      Follow up documented    ACTIVE MEDICAL ISSUES:  Documented in Problem List    PAST MEDICAL HISTORY  Documented    PAST SURGICAL HISTORY:  Documented    SOCIAL HISTORY:  Documented    FAMILY HISTORY:  Documented    ALLERGIES AND MEDICATIONS: updated and reviewed.  Documented    Health Maintenance       Date Due Completion Date    TETANUS VACCINE 09/17/1968 ---    Pneumococcal Vaccine (65+ High/Highest Risk) (2 of 2 - PPSV23) 03/26/2018 1/29/2018    Influenza Vaccine 08/01/2018 1/29/2018    Override on 1/14/2016: Done    Lipid Panel 02/02/2019 2/2/2018    Colonoscopy 09/13/2021 9/13/2016

## 2019-01-12 DIAGNOSIS — G95.9 CERVICAL MYELOPATHY WITH CERVICAL RADICULOPATHY: Primary | ICD-10-CM

## 2019-01-12 DIAGNOSIS — M54.17 LUMBOSACRAL NEURITIS: ICD-10-CM

## 2019-01-12 DIAGNOSIS — M54.12 CERVICAL MYELOPATHY WITH CERVICAL RADICULOPATHY: Primary | ICD-10-CM

## 2019-01-13 NOTE — PROGRESS NOTES
Please notify patient that x-rays show arthritis but no other significant findings.  Will order MRI to evaluate further.

## 2019-01-14 ENCOUNTER — TELEPHONE (OUTPATIENT)
Dept: FAMILY MEDICINE | Facility: CLINIC | Age: 69
End: 2019-01-14

## 2019-01-14 NOTE — TELEPHONE ENCOUNTER
----- Message from Azikiwe K. Lombard, MD sent at 1/12/2019 10:10 PM CST -----  Please notify patient that x-rays show arthritis but no other significant findings.  Will order MRI to evaluate further.

## 2019-01-14 NOTE — TELEPHONE ENCOUNTER
Attempt to contact patient to notify of results. No answer. Left message to return call to clinic.

## 2019-01-15 NOTE — TELEPHONE ENCOUNTER
----- Message from Chanel Flores sent at 1/15/2019  3:55 PM CST -----  Contact: self [phone]  Pt returned a missed call to staff

## 2019-02-04 ENCOUNTER — LAB VISIT (OUTPATIENT)
Dept: LAB | Facility: HOSPITAL | Age: 69
End: 2019-02-04
Attending: FAMILY MEDICINE
Payer: COMMERCIAL

## 2019-02-04 DIAGNOSIS — Z00.00 WELL ADULT EXAM: ICD-10-CM

## 2019-02-04 LAB
ALBUMIN SERPL BCP-MCNC: 3.6 G/DL
ALP SERPL-CCNC: 75 U/L
ALT SERPL W/O P-5'-P-CCNC: 19 U/L
ANION GAP SERPL CALC-SCNC: 6 MMOL/L
AST SERPL-CCNC: 18 U/L
BASOPHILS # BLD AUTO: 0.08 K/UL
BASOPHILS NFR BLD: 1.2 %
BILIRUB SERPL-MCNC: 0.6 MG/DL
BUN SERPL-MCNC: 19 MG/DL
CALCIUM SERPL-MCNC: 9.6 MG/DL
CHLORIDE SERPL-SCNC: 106 MMOL/L
CHOLEST SERPL-MCNC: 133 MG/DL
CHOLEST/HDLC SERPL: 3.6 {RATIO}
CO2 SERPL-SCNC: 27 MMOL/L
COMPLEXED PSA SERPL-MCNC: <0.01 NG/ML
CREAT SERPL-MCNC: 1.3 MG/DL
DIFFERENTIAL METHOD: ABNORMAL
EOSINOPHIL # BLD AUTO: 0.3 K/UL
EOSINOPHIL NFR BLD: 3.6 %
ERYTHROCYTE [DISTWIDTH] IN BLOOD BY AUTOMATED COUNT: 14.1 %
EST. GFR  (AFRICAN AMERICAN): >60 ML/MIN/1.73 M^2
EST. GFR  (NON AFRICAN AMERICAN): 56.1 ML/MIN/1.73 M^2
GLUCOSE SERPL-MCNC: 102 MG/DL
HCT VFR BLD AUTO: 41.8 %
HDLC SERPL-MCNC: 37 MG/DL
HDLC SERPL: 27.8 %
HGB BLD-MCNC: 13 G/DL
IMM GRANULOCYTES # BLD AUTO: 0.03 K/UL
IMM GRANULOCYTES NFR BLD AUTO: 0.4 %
LDLC SERPL CALC-MCNC: 81 MG/DL
LYMPHOCYTES # BLD AUTO: 2.5 K/UL
LYMPHOCYTES NFR BLD: 36.1 %
MCH RBC QN AUTO: 28.8 PG
MCHC RBC AUTO-ENTMCNC: 31.1 G/DL
MCV RBC AUTO: 93 FL
MONOCYTES # BLD AUTO: 0.6 K/UL
MONOCYTES NFR BLD: 9.1 %
NEUTROPHILS # BLD AUTO: 3.5 K/UL
NEUTROPHILS NFR BLD: 49.6 %
NONHDLC SERPL-MCNC: 96 MG/DL
NRBC BLD-RTO: 0 /100 WBC
PLATELET # BLD AUTO: 393 K/UL
PMV BLD AUTO: 9.6 FL
POTASSIUM SERPL-SCNC: 4.5 MMOL/L
PROT SERPL-MCNC: 7.3 G/DL
RBC # BLD AUTO: 4.51 M/UL
SODIUM SERPL-SCNC: 139 MMOL/L
TRIGL SERPL-MCNC: 75 MG/DL
WBC # BLD AUTO: 6.95 K/UL

## 2019-02-04 PROCEDURE — 36415 COLL VENOUS BLD VENIPUNCTURE: CPT | Mod: PO

## 2019-02-04 PROCEDURE — 85025 COMPLETE CBC W/AUTO DIFF WBC: CPT

## 2019-02-04 PROCEDURE — 84153 ASSAY OF PSA TOTAL: CPT

## 2019-02-04 PROCEDURE — 80061 LIPID PANEL: CPT

## 2019-02-04 PROCEDURE — 80053 COMPREHEN METABOLIC PANEL: CPT

## 2019-02-11 ENCOUNTER — OFFICE VISIT (OUTPATIENT)
Dept: FAMILY MEDICINE | Facility: CLINIC | Age: 69
End: 2019-02-11
Payer: COMMERCIAL

## 2019-02-11 VITALS
WEIGHT: 261 LBS | HEART RATE: 92 BPM | DIASTOLIC BLOOD PRESSURE: 88 MMHG | SYSTOLIC BLOOD PRESSURE: 156 MMHG | BODY MASS INDEX: 40.97 KG/M2 | OXYGEN SATURATION: 97 % | HEIGHT: 67 IN | RESPIRATION RATE: 17 BRPM | TEMPERATURE: 99 F

## 2019-02-11 DIAGNOSIS — M54.12 CERVICAL MYELOPATHY WITH CERVICAL RADICULOPATHY: ICD-10-CM

## 2019-02-11 DIAGNOSIS — M17.0 PRIMARY OSTEOARTHRITIS OF BOTH KNEES: ICD-10-CM

## 2019-02-11 DIAGNOSIS — Z23 NEED FOR PNEUMOCOCCAL VACCINATION: ICD-10-CM

## 2019-02-11 DIAGNOSIS — G95.9 CERVICAL MYELOPATHY WITH CERVICAL RADICULOPATHY: ICD-10-CM

## 2019-02-11 DIAGNOSIS — I10 ESSENTIAL HYPERTENSION: Primary | ICD-10-CM

## 2019-02-11 DIAGNOSIS — Z85.46 HISTORY OF PROSTATE CANCER: ICD-10-CM

## 2019-02-11 DIAGNOSIS — M54.17 LUMBOSACRAL NEURITIS: ICD-10-CM

## 2019-02-11 DIAGNOSIS — N30.00 ACUTE CYSTITIS WITHOUT HEMATURIA: ICD-10-CM

## 2019-02-11 LAB
BILIRUB SERPL-MCNC: NEGATIVE MG/DL
BLOOD URINE, POC: ABNORMAL
COLOR, POC UA: YELLOW
GLUCOSE UR QL STRIP: NORMAL
KETONES UR QL STRIP: NEGATIVE
LEUKOCYTE ESTERASE URINE, POC: ABNORMAL
NITRITE, POC UA: NEGATIVE
PH, POC UA: 5
PROTEIN, POC: ABNORMAL
SPECIFIC GRAVITY, POC UA: 1.01
UROBILINOGEN, POC UA: 1

## 2019-02-11 PROCEDURE — 99999 PR PBB SHADOW E&M-EST. PATIENT-LVL IV: CPT | Mod: PBBFAC,,, | Performed by: FAMILY MEDICINE

## 2019-02-11 PROCEDURE — 87186 SC STD MICRODIL/AGAR DIL: CPT | Mod: 59

## 2019-02-11 PROCEDURE — 1101F PT FALLS ASSESS-DOCD LE1/YR: CPT | Mod: CPTII,S$GLB,, | Performed by: FAMILY MEDICINE

## 2019-02-11 PROCEDURE — 3079F PR MOST RECENT DIASTOLIC BLOOD PRESSURE 80-89 MM HG: ICD-10-PCS | Mod: CPTII,S$GLB,, | Performed by: FAMILY MEDICINE

## 2019-02-11 PROCEDURE — 87086 URINE CULTURE/COLONY COUNT: CPT

## 2019-02-11 PROCEDURE — 90471 PNEUMOCOCCAL POLYSACCHARIDE VACCINE 23-VALENT =>2YO SQ IM: ICD-10-PCS | Mod: S$GLB,,, | Performed by: FAMILY MEDICINE

## 2019-02-11 PROCEDURE — 1101F PR PT FALLS ASSESS DOC 0-1 FALLS W/OUT INJ PAST YR: ICD-10-PCS | Mod: CPTII,S$GLB,, | Performed by: FAMILY MEDICINE

## 2019-02-11 PROCEDURE — 99999 PR PBB SHADOW E&M-EST. PATIENT-LVL IV: ICD-10-PCS | Mod: PBBFAC,,, | Performed by: FAMILY MEDICINE

## 2019-02-11 PROCEDURE — 90732 PPSV23 VACC 2 YRS+ SUBQ/IM: CPT | Mod: S$GLB,,, | Performed by: FAMILY MEDICINE

## 2019-02-11 PROCEDURE — 99214 PR OFFICE/OUTPT VISIT, EST, LEVL IV, 30-39 MIN: ICD-10-PCS | Mod: 25,S$GLB,, | Performed by: FAMILY MEDICINE

## 2019-02-11 PROCEDURE — 3079F DIAST BP 80-89 MM HG: CPT | Mod: CPTII,S$GLB,, | Performed by: FAMILY MEDICINE

## 2019-02-11 PROCEDURE — 3077F PR MOST RECENT SYSTOLIC BLOOD PRESSURE >= 140 MM HG: ICD-10-PCS | Mod: CPTII,S$GLB,, | Performed by: FAMILY MEDICINE

## 2019-02-11 PROCEDURE — 81001 URINALYSIS AUTO W/SCOPE: CPT | Mod: S$GLB,,, | Performed by: FAMILY MEDICINE

## 2019-02-11 PROCEDURE — 90471 IMMUNIZATION ADMIN: CPT | Mod: S$GLB,,, | Performed by: FAMILY MEDICINE

## 2019-02-11 PROCEDURE — 99214 OFFICE O/P EST MOD 30 MIN: CPT | Mod: 25,S$GLB,, | Performed by: FAMILY MEDICINE

## 2019-02-11 PROCEDURE — 3077F SYST BP >= 140 MM HG: CPT | Mod: CPTII,S$GLB,, | Performed by: FAMILY MEDICINE

## 2019-02-11 PROCEDURE — 90732 PNEUMOCOCCAL POLYSACCHARIDE VACCINE 23-VALENT =>2YO SQ IM: ICD-10-PCS | Mod: S$GLB,,, | Performed by: FAMILY MEDICINE

## 2019-02-11 PROCEDURE — 87077 CULTURE AEROBIC IDENTIFY: CPT | Mod: 59

## 2019-02-11 PROCEDURE — 81001 POCT URINALYSIS, DIPSTICK OR TABLET REAGENT, AUTOMATED, WITH MICROSCOP: ICD-10-PCS | Mod: S$GLB,,, | Performed by: FAMILY MEDICINE

## 2019-02-11 PROCEDURE — 87088 URINE BACTERIA CULTURE: CPT

## 2019-02-11 RX ORDER — AMLODIPINE AND OLMESARTAN MEDOXOMIL 10; 40 MG/1; MG/1
1 TABLET ORAL DAILY
Qty: 30 TABLET | Refills: 5 | Status: SHIPPED | OUTPATIENT
Start: 2019-02-11 | End: 2019-06-10 | Stop reason: SDUPTHER

## 2019-02-11 RX ORDER — SULFAMETHOXAZOLE AND TRIMETHOPRIM 800; 160 MG/1; MG/1
1 TABLET ORAL 2 TIMES DAILY
Qty: 6 TABLET | Refills: 0 | Status: SHIPPED | OUTPATIENT
Start: 2019-02-11 | End: 2019-02-14

## 2019-02-11 RX ORDER — NAPROXEN 375 MG/1
375 TABLET ORAL 2 TIMES DAILY WITH MEALS
Qty: 60 TABLET | Refills: 5 | Status: SHIPPED | OUTPATIENT
Start: 2019-02-11 | End: 2019-05-23

## 2019-02-11 NOTE — PROGRESS NOTES
Chief Complaint   Patient presents with    Annual Exam       Bryan Queen is a 68 y.o. male who presents per the Chief Complaint.  Pt is known to me and was last seen by me on 1/11/2019.  All known chronic medical issues have been documented.       Leg Pain    The incident occurred more than 1 week ago. There was no injury mechanism. The pain is present in the left leg, left hip and left thigh. The quality of the pain is described as burning and stabbing. The pain is at a severity of 5/10. The pain is moderate. The pain has been fluctuating since onset. Pertinent negatives include no inability to bear weight, loss of motion, loss of sensation, muscle weakness, numbness or tingling. He reports no foreign bodies present. The symptoms are aggravated by movement. He has tried NSAIDs for the symptoms. The treatment provided mild relief.   Back Pain   This is a chronic problem. The current episode started more than 1 year ago. The problem occurs daily. The problem is unchanged. The pain is present in the lumbar spine. The quality of the pain is described as aching. The pain radiates to the right thigh and left thigh. The pain is at a severity of 5/10. The pain is moderate. The pain is worse during the day. The symptoms are aggravated by position, standing and sitting. Associated symptoms include leg pain. Pertinent negatives include no abdominal pain, bladder incontinence, bowel incontinence, chest pain, dysuria, fever, headaches, numbness, paresis, paresthesias, pelvic pain, perianal numbness, tingling, weakness or weight loss. Risk factors include lack of exercise, obesity and sedentary lifestyle. He has tried NSAIDs and heat for the symptoms. The treatment provided mild relief.   Neck Pain    This is a new problem. The current episode started 1 to 4 weeks ago. The problem occurs daily. The problem has been gradually worsening. The pain is associated with nothing. The pain is present in the right side. The quality of  the pain is described as aching. The pain is at a severity of 5/10. The pain is moderate. The symptoms are aggravated by position. The pain is worse during the day. Associated symptoms include leg pain. Pertinent negatives include no chest pain, fever, headaches, numbness, pain with swallowing, paresis, photophobia, syncope, tingling, trouble swallowing, visual change, weakness or weight loss.   Hypertension   This is a chronic problem. The current episode started more than 1 year ago. The problem has been gradually worsening since onset. The problem is uncontrolled. Associated symptoms include neck pain. Pertinent negatives include no anxiety, blurred vision, chest pain, headaches, malaise/fatigue, orthopnea, palpitations, peripheral edema, PND, shortness of breath or sweats. There are no associated agents to hypertension. Risk factors for coronary artery disease include male gender and obesity. Past treatments include calcium channel blockers. The current treatment provides moderate improvement. Compliance problems include exercise and diet.  There is no history of angina, kidney disease, CAD/MI, CVA, heart failure, left ventricular hypertrophy, PVD or retinopathy. There is no history of chronic renal disease, coarctation of the aorta, hyperaldosteronism, hypercortisolism, hyperparathyroidism, a hypertension causing med, pheochromocytoma, renovascular disease, sleep apnea or a thyroid problem.        ROS  Review of Systems   Constitutional: Negative for activity change, appetite change, chills, fatigue, fever, malaise/fatigue and weight loss.   HENT: Negative for congestion, dental problem, drooling, ear discharge, ear pain, facial swelling, hearing loss, mouth sores, postnasal drip, rhinorrhea, sinus pressure, sore throat and trouble swallowing.    Eyes: Negative for blurred vision, photophobia, pain, discharge, redness and visual disturbance.   Respiratory: Negative for cough, chest tightness and shortness of  "breath.    Cardiovascular: Negative for chest pain, palpitations, orthopnea, syncope and PND.   Gastrointestinal: Negative for abdominal pain, bowel incontinence, constipation, diarrhea, nausea and vomiting.   Genitourinary: Positive for frequency. Negative for bladder incontinence, decreased urine volume, difficulty urinating, discharge, dysuria, enuresis, flank pain, genital sores, hematuria, pelvic pain, penile pain, penile swelling, scrotal swelling, testicular pain and urgency.   Musculoskeletal: Positive for arthralgias (both knees), back pain and neck pain. Negative for gait problem, joint swelling, myalgias and neck stiffness.   Skin: Negative.  Negative for color change, pallor, rash and wound.   Allergic/Immunologic: Negative for environmental allergies, food allergies and immunocompromised state.   Neurological: Negative.  Negative for dizziness, tingling, weakness, light-headedness, numbness, headaches and paresthesias.   Psychiatric/Behavioral: Negative.        Physical Exam  Vitals:    02/11/19 0919   BP: (!) 156/88   Pulse: 92   Resp: 17   Temp: 98.5 °F (36.9 °C)    Body mass index is 40.88 kg/m².  Weight: 118.4 kg (261 lb 0.4 oz)   Height: 5' 7" (170.2 cm)     Physical Exam   Constitutional: He is oriented to person, place, and time. He appears well-developed and well-nourished. He is active and cooperative.  Non-toxic appearance. He does not have a sickly appearance. He does not appear ill. No distress.   HENT:   Head: Normocephalic and atraumatic.   Right Ear: Hearing, tympanic membrane, external ear and ear canal normal. No tenderness. No foreign bodies. No mastoid tenderness. Tympanic membrane is not injected, not scarred, not perforated, not erythematous, not retracted and not bulging. No hemotympanum. No decreased hearing is noted.   Left Ear: Hearing, tympanic membrane, external ear and ear canal normal. No tenderness. No foreign bodies. No mastoid tenderness. Tympanic membrane is not " injected, not scarred, not perforated, not erythematous, not retracted and not bulging. No hemotympanum. No decreased hearing is noted.   Nose: Nose normal. No sinus tenderness, nasal deformity or septal deviation. No epistaxis.  No foreign bodies.   Mouth/Throat: Uvula is midline, oropharynx is clear and moist and mucous membranes are normal. He does not have dentures. Normal dentition. No uvula swelling or dental caries. No oropharyngeal exudate, posterior oropharyngeal edema, posterior oropharyngeal erythema or tonsillar abscesses.   Eyes: Conjunctivae, EOM and lids are normal. Pupils are equal, round, and reactive to light. Right eye exhibits no chemosis, no discharge, no exudate and no hordeolum. No foreign body present in the right eye. Left eye exhibits no chemosis, no discharge, no exudate and no hordeolum. No foreign body present in the left eye. No scleral icterus.   Neck: Normal range of motion and full passive range of motion without pain. Neck supple. No thyroid mass and no thyromegaly present.   Cardiovascular: Normal rate, regular rhythm, S1 normal, S2 normal and normal heart sounds. Exam reveals no gallop and no friction rub.   No murmur heard.  Pulmonary/Chest: Effort normal and breath sounds normal. He has no decreased breath sounds. He has no wheezes. He has no rhonchi. He has no rales.   Abdominal: Soft. Normal appearance and bowel sounds are normal. He exhibits no distension, no ascites and no mass. There is no hepatosplenomegaly. There is no tenderness. There is no rigidity, no rebound and no guarding. No hernia.   Musculoskeletal:        Right knee: He exhibits normal range of motion, no swelling, no effusion, no ecchymosis, no deformity, no laceration, no erythema, normal alignment, no LCL laxity, normal patellar mobility, no bony tenderness, normal meniscus and no MCL laxity. Tenderness found. Medial joint line and lateral joint line tenderness noted. No MCL, no LCL and no patellar tendon  tenderness noted.        Left knee: He exhibits normal range of motion, no swelling, no effusion, no ecchymosis, no deformity, no laceration, no erythema, normal alignment, no LCL laxity, no bony tenderness, normal meniscus and no MCL laxity. Tenderness found. Medial joint line and lateral joint line tenderness noted. No MCL, no LCL and no patellar tendon tenderness noted.        Cervical back: He exhibits tenderness and pain. He exhibits normal range of motion, no bony tenderness, no swelling, no edema, no deformity, no laceration, no spasm and normal pulse.        Lumbar back: He exhibits decreased range of motion, tenderness and pain. He exhibits no bony tenderness, no swelling, no edema, no deformity, no laceration, no spasm and normal pulse.        Right upper leg: He exhibits tenderness.        Left upper leg: He exhibits tenderness.   Lymphadenopathy:        Head (right side): No submental, no submandibular, no preauricular and no posterior auricular adenopathy present.        Head (left side): No submental, no submandibular, no preauricular and no posterior auricular adenopathy present.     He has no cervical adenopathy.   Neurological: He is alert and oriented to person, place, and time. He has normal strength. He displays no atrophy and no tremor. No cranial nerve deficit or sensory deficit. He exhibits normal muscle tone. He displays no seizure activity.   Skin: Skin is warm, dry and intact. No rash noted. He is not diaphoretic. No pallor.   Psychiatric: He has a normal mood and affect. His speech is normal and behavior is normal. Judgment and thought content normal. Cognition and memory are normal. He is attentive.   Vitals reviewed.      Assessment & Plan    1. Essential hypertension  Patient was counseled and encouraged to maintain a low sodium diet, as well as increasing physical activity.  Recommend random BP checks at home on a regular basis.  Repeat BP at end of visit was not improved. Will  continue medication at this time, and follow up in 4-6 weeks, or sooner if blood pressure does not improve over time.  ARB added.  Patient advised to return for nurse BP check in one week.   - amlodipine-olmesartan (JUDY) 10-40 mg per tablet; Take 1 tablet by mouth once daily.  Dispense: 30 tablet; Refill: 5    2. Cervical myelopathy with cervical radiculopathy  Patient is advised that arthritis is a result of regular daily use, and is caused by inflammation in the joint.  Patient was encouraged to exercise as tolerated, and attempt weight loss with sensible diet and lifestyle changes.  Patient was recommended to continue NSAID therapy to reduce inflammation, and to incorporate stretching into a daily routine.  Pain medication will be prescribed as necessary.  Patient should follow up if pain is not well controlled.   - naproxen (NAPROSYN) 375 MG tablet; Take 1 tablet (375 mg total) by mouth 2 (two) times daily with meals.  Dispense: 60 tablet; Refill: 5    3. Primary osteoarthritis of both knees  Patient is advised that arthritis is a result of regular daily use, and is caused by inflammation in the joint.  Patient was encouraged to exercise as tolerated, and attempt weight loss with sensible diet and lifestyle changes.  Patient was recommended to continue NSAID therapy to reduce inflammation, and to incorporate stretching into a daily routine.  Pain medication will be prescribed as necessary.  Patient should follow up if pain is not well controlled.   - naproxen (NAPROSYN) 375 MG tablet; Take 1 tablet (375 mg total) by mouth 2 (two) times daily with meals.  Dispense: 60 tablet; Refill: 5    4. Lumbosacral neuritis  Patient was advised that pain is most likely a muscle injury or strain and to apply a heating pad to affected area up to twice daily for relief.  Also, medication was prescribed for symptom relief.  Additionally, patient was encouraged to try stretching the muscles as tolerated as well.  Patient was  advised to follow up if pain not improving, or if symptoms are worsening.   - naproxen (NAPROSYN) 375 MG tablet; Take 1 tablet (375 mg total) by mouth 2 (two) times daily with meals.  Dispense: 60 tablet; Refill: 5    5. Acute cystitis without hematuria  POCT UA with current symptoms support diagnosis of UTI; start antibiotic therapy and follow up as needed  Referral to appropriate specialist for evaluation and management placed in [facility].   - Ambulatory referral to Urology  - POCT urinalysis, dipstick or tablet reag  - Urine culture  - sulfamethoxazole-trimethoprim 800-160mg (BACTRIM DS) 800-160 mg Tab; Take 1 tablet by mouth 2 (two) times daily. for 3 days  Dispense: 6 tablet; Refill: 0    6. History of prostate cancer  Referral to appropriate specialist for evaluation and management placed in [facility].   - Ambulatory referral to Urology    7. Need for pneumococcal vaccination  Patient due for pneumonia vaccine; Patient agreed and vaccine was administered in clinic today without complications.   - Pneumococcal Polysaccharide Vaccine (23 Valent) (SQ/IM)      Follow up documented    ACTIVE MEDICAL ISSUES:  Documented in Problem List    PAST MEDICAL HISTORY  Documented    PAST SURGICAL HISTORY:  Documented    SOCIAL HISTORY:  Documented    FAMILY HISTORY:  Documented    ALLERGIES AND MEDICATIONS: updated and reviewed.  Documented    Health Maintenance       Date Due Completion Date    TETANUS VACCINE 09/17/1968 ---    Pneumococcal Vaccine (65+ High/Highest Risk) (2 of 2 - PPSV23) 03/26/2018 1/29/2018    Lipid Panel 02/04/2020 2/4/2019    Colonoscopy 09/13/2021 9/13/2016         details… detailed exam

## 2019-02-11 NOTE — PROGRESS NOTES
Pneumococcal 23 vaccine administered IM to right deltoid. VIS form given. Tolerated well. No adverse reaction noted.

## 2019-02-13 LAB — BACTERIA UR CULT: NORMAL

## 2019-02-21 ENCOUNTER — TELEPHONE (OUTPATIENT)
Dept: FAMILY MEDICINE | Facility: CLINIC | Age: 69
End: 2019-02-21

## 2019-02-21 NOTE — LETTER
February 21, 2019    Bryan MEDHAT Bret  [address] [facility] [address]             [facility]  [address] [place], [place] [address] [address]  Phone: [phone]  Fax: [phone] Dear Natasha Bret:    Lakisha we were unable to contact you to schedule your Urology appointment. Please give the referral department a call at [phone].        If you have any questions or concerns, please don't hesitate to call.    Sincerely,        Shellie Drake MA

## 2019-04-23 ENCOUNTER — TELEPHONE (OUTPATIENT)
Dept: FAMILY MEDICINE | Facility: CLINIC | Age: 69
End: 2019-04-23

## 2019-04-23 NOTE — TELEPHONE ENCOUNTER
Received call from spouse stating since pt started taking amlodipine-olmesartan his blood sugars have been elevated and he is not diabetic; spouse is diabetic and she checks pts blood sugars at times; she states he usually runs in the 80's and since he has been taking amlodipine-olmesaratn it has been running 102-107; I tried to explain to her that this is still normal range but she requested message be sent to Dr Lombard; I also scheduled OV for 5/23 to discuss (this is earliest appt slot unless you want to put in urgent spot)

## 2019-05-08 NOTE — TELEPHONE ENCOUNTER
----- Message from Nita Roblero sent at 7/11/2018 11:37 AM CDT -----  Contact: Joslyn with [facility]/ [phone]  Refill 90 day supply of levocetirizine (XYZAL) 5 MG tablet. Thank you.  .  [facility]/pharmacy #[id number] - [facility] [address] [facility]  [address] [facility] [address]  Phone: [phone] Fax: [phone]   hold orals  aiss fs tid ac

## 2019-05-23 ENCOUNTER — OFFICE VISIT (OUTPATIENT)
Dept: FAMILY MEDICINE | Facility: CLINIC | Age: 69
End: 2019-05-23
Payer: COMMERCIAL

## 2019-05-23 ENCOUNTER — HOSPITAL ENCOUNTER (OUTPATIENT)
Dept: RADIOLOGY | Facility: HOSPITAL | Age: 69
Discharge: HOME | End: 2019-05-23
Attending: FAMILY MEDICINE
Payer: COMMERCIAL

## 2019-05-23 VITALS
HEIGHT: 67 IN | HEART RATE: 80 BPM | DIASTOLIC BLOOD PRESSURE: 66 MMHG | OXYGEN SATURATION: 97 % | RESPIRATION RATE: 18 BRPM | TEMPERATURE: 99 F | BODY MASS INDEX: 40.38 KG/M2 | SYSTOLIC BLOOD PRESSURE: 116 MMHG | WEIGHT: 257.25 LBS

## 2019-05-23 DIAGNOSIS — Z23 NEED FOR TETANUS BOOSTER: ICD-10-CM

## 2019-05-23 DIAGNOSIS — M54.17 LUMBOSACRAL NEURITIS: ICD-10-CM

## 2019-05-23 DIAGNOSIS — M25.511 ACUTE PAIN OF RIGHT SHOULDER: ICD-10-CM

## 2019-05-23 DIAGNOSIS — I10 ESSENTIAL HYPERTENSION: Primary | ICD-10-CM

## 2019-05-23 DIAGNOSIS — M79.671 ACUTE PAIN OF RIGHT FOOT: ICD-10-CM

## 2019-05-23 DIAGNOSIS — M17.0 PRIMARY OSTEOARTHRITIS OF BOTH KNEES: ICD-10-CM

## 2019-05-23 DIAGNOSIS — M47.26 OSTEOARTHRITIS OF SPINE WITH RADICULOPATHY, LUMBAR REGION: ICD-10-CM

## 2019-05-23 PROCEDURE — 90471 TDAP VACCINE GREATER THAN OR EQUAL TO 7YO IM: ICD-10-PCS | Mod: 59,S$GLB,, | Performed by: FAMILY MEDICINE

## 2019-05-23 PROCEDURE — 99214 PR OFFICE/OUTPT VISIT, EST, LEVL IV, 30-39 MIN: ICD-10-PCS | Mod: 25,S$GLB,, | Performed by: FAMILY MEDICINE

## 2019-05-23 PROCEDURE — 99999 PR PBB SHADOW E&M-EST. PATIENT-LVL III: CPT | Mod: PBBFAC,,, | Performed by: FAMILY MEDICINE

## 2019-05-23 PROCEDURE — 73030 XR SHOULDER COMPLETE 2 OR MORE VIEWS RIGHT: ICD-10-PCS | Mod: 26,RT,, | Performed by: RADIOLOGY

## 2019-05-23 PROCEDURE — 73630 XR FOOT COMPLETE 3 VIEW RIGHT: ICD-10-PCS | Mod: 26,RT,, | Performed by: RADIOLOGY

## 2019-05-23 PROCEDURE — 1101F PR PT FALLS ASSESS DOC 0-1 FALLS W/OUT INJ PAST YR: ICD-10-PCS | Mod: CPTII,S$GLB,, | Performed by: FAMILY MEDICINE

## 2019-05-23 PROCEDURE — 3074F PR MOST RECENT SYSTOLIC BLOOD PRESSURE < 130 MM HG: ICD-10-PCS | Mod: CPTII,S$GLB,, | Performed by: FAMILY MEDICINE

## 2019-05-23 PROCEDURE — 90471 IMMUNIZATION ADMIN: CPT | Mod: 59,S$GLB,, | Performed by: FAMILY MEDICINE

## 2019-05-23 PROCEDURE — 90715 TDAP VACCINE 7 YRS/> IM: CPT | Mod: S$GLB,,, | Performed by: FAMILY MEDICINE

## 2019-05-23 PROCEDURE — 73030 X-RAY EXAM OF SHOULDER: CPT | Mod: 26,RT,, | Performed by: RADIOLOGY

## 2019-05-23 PROCEDURE — 99214 OFFICE O/P EST MOD 30 MIN: CPT | Mod: 25,S$GLB,, | Performed by: FAMILY MEDICINE

## 2019-05-23 PROCEDURE — 3078F DIAST BP <80 MM HG: CPT | Mod: CPTII,S$GLB,, | Performed by: FAMILY MEDICINE

## 2019-05-23 PROCEDURE — 73630 X-RAY EXAM OF FOOT: CPT | Mod: 26,RT,, | Performed by: RADIOLOGY

## 2019-05-23 PROCEDURE — 99999 PR PBB SHADOW E&M-EST. PATIENT-LVL III: ICD-10-PCS | Mod: PBBFAC,,, | Performed by: FAMILY MEDICINE

## 2019-05-23 PROCEDURE — 1101F PT FALLS ASSESS-DOCD LE1/YR: CPT | Mod: CPTII,S$GLB,, | Performed by: FAMILY MEDICINE

## 2019-05-23 PROCEDURE — 3078F PR MOST RECENT DIASTOLIC BLOOD PRESSURE < 80 MM HG: ICD-10-PCS | Mod: CPTII,S$GLB,, | Performed by: FAMILY MEDICINE

## 2019-05-23 PROCEDURE — 90715 TDAP VACCINE GREATER THAN OR EQUAL TO 7YO IM: ICD-10-PCS | Mod: S$GLB,,, | Performed by: FAMILY MEDICINE

## 2019-05-23 PROCEDURE — 73630 X-RAY EXAM OF FOOT: CPT | Mod: TC,FY,RT

## 2019-05-23 PROCEDURE — 73030 X-RAY EXAM OF SHOULDER: CPT | Mod: TC,FY,RT

## 2019-05-23 PROCEDURE — 3074F SYST BP LT 130 MM HG: CPT | Mod: CPTII,S$GLB,, | Performed by: FAMILY MEDICINE

## 2019-05-23 RX ORDER — DULOXETIN HYDROCHLORIDE 60 MG/1
60 CAPSULE, DELAYED RELEASE ORAL DAILY
Qty: 90 CAPSULE | Refills: 1 | Status: SHIPPED | OUTPATIENT
Start: 2019-05-23 | End: 2019-12-18 | Stop reason: SDUPTHER

## 2019-05-23 RX ORDER — MELOXICAM 15 MG/1
15 TABLET ORAL DAILY
Qty: 30 TABLET | Refills: 5 | Status: SHIPPED | OUTPATIENT
Start: 2019-05-23 | End: 2019-11-15 | Stop reason: SDUPTHER

## 2019-05-23 NOTE — PROGRESS NOTES
Chief Complaint   Patient presents with    Hypertension       Bryan Queen is a 68 y.o. male who presents per the Chief Complaint.  Pt is known to me and was last seen by me on 2/11/2019.  All known chronic medical issues have been documented.  Patient reports severe pain in his right foot since Sunday and denies any unusual activity or injury.     Hypertension   This is a chronic problem. The current episode started more than 1 year ago. The problem has been gradually worsening since onset. The problem is uncontrolled. Pertinent negatives include no anxiety, blurred vision, chest pain, headaches, malaise/fatigue, neck pain, orthopnea, palpitations, peripheral edema, PND, shortness of breath or sweats. There are no associated agents to hypertension. Risk factors for coronary artery disease include male gender and obesity. Past treatments include calcium channel blockers. The current treatment provides moderate improvement. Compliance problems include exercise and diet.  There is no history of angina, kidney disease, CAD/MI, CVA, heart failure, left ventricular hypertrophy, PVD or retinopathy. There is no history of chronic renal disease, coarctation of the aorta, hyperaldosteronism, hypercortisolism, hyperparathyroidism, a hypertension causing med, pheochromocytoma, renovascular disease, sleep apnea or a thyroid problem.   Leg Pain    The incident occurred more than 1 week ago. There was no injury mechanism. The pain is present in the left leg, left hip and left thigh. The quality of the pain is described as burning and stabbing. The pain is at a severity of 5/10. The pain is moderate. The pain has been fluctuating since onset. Pertinent negatives include no inability to bear weight, loss of motion, loss of sensation, muscle weakness, numbness or tingling. He reports no foreign bodies present. The symptoms are aggravated by movement. He has tried NSAIDs for the symptoms. The treatment provided mild relief.   Back  Pain   This is a chronic problem. The current episode started more than 1 year ago. The problem occurs daily. The problem is unchanged. The pain is present in the lumbar spine. The quality of the pain is described as aching. The pain radiates to the right thigh and left thigh. The pain is at a severity of 5/10. The pain is moderate. The pain is worse during the day. The symptoms are aggravated by position, standing and sitting. Associated symptoms include leg pain. Pertinent negatives include no abdominal pain, bladder incontinence, bowel incontinence, chest pain, dysuria, fever, headaches, numbness, paresis, paresthesias, pelvic pain, perianal numbness, tingling, weakness or weight loss. Risk factors include lack of exercise, obesity and sedentary lifestyle. He has tried NSAIDs and heat for the symptoms. The treatment provided mild relief.   Neck Pain    This is a new problem. The current episode started 1 to 4 weeks ago. The problem occurs daily. The problem has been gradually worsening. The pain is associated with nothing. The pain is present in the right side. The quality of the pain is described as aching. The pain is at a severity of 5/10. The pain is moderate. The symptoms are aggravated by position. The pain is worse during the day. Associated symptoms include leg pain. Pertinent negatives include no chest pain, fever, headaches, numbness, pain with swallowing, paresis, photophobia, syncope, tingling, trouble swallowing, visual change, weakness or weight loss.        ROS  Review of Systems   Constitutional: Negative for activity change, appetite change, chills, fatigue, fever, malaise/fatigue and weight loss.   HENT: Negative for congestion, dental problem, drooling, ear discharge, ear pain, facial swelling, hearing loss, mouth sores, postnasal drip, rhinorrhea, sinus pressure, sore throat and trouble swallowing.    Eyes: Negative for blurred vision, photophobia, pain, discharge, redness and visual  "disturbance.   Respiratory: Negative for cough, chest tightness and shortness of breath.    Cardiovascular: Negative for chest pain, palpitations, orthopnea, syncope and PND.   Gastrointestinal: Negative for abdominal pain, bowel incontinence, constipation, diarrhea, nausea and vomiting.   Genitourinary: Positive for frequency. Negative for bladder incontinence, decreased urine volume, difficulty urinating, discharge, dysuria, enuresis, flank pain, genital sores, hematuria, pelvic pain, penile pain, penile swelling, scrotal swelling, testicular pain and urgency.   Musculoskeletal: Positive for arthralgias (fight foot, right shoulder), back pain and gait problem. Negative for joint swelling, myalgias, neck pain and neck stiffness.   Skin: Negative.  Negative for color change, pallor, rash and wound.   Allergic/Immunologic: Negative for environmental allergies, food allergies and immunocompromised state.   Neurological: Negative for dizziness, tingling, weakness, light-headedness, numbness, headaches and paresthesias.   Psychiatric/Behavioral: Negative.        Physical Exam  Vitals:    05/23/19 1107   BP: 116/66   Pulse: 80   Resp: 18   Temp: 98.6 °F (37 °C)    Body mass index is 40.3 kg/m².  Weight: 116.7 kg (257 lb 4.4 oz)   Height: 5' 7" (170.2 cm)     Physical Exam   Constitutional: He is oriented to person, place, and time. He appears well-developed and well-nourished. He is active and cooperative.  Non-toxic appearance. He does not have a sickly appearance. He does not appear ill. No distress.   HENT:   Head: Normocephalic and atraumatic.   Right Ear: Hearing, tympanic membrane, external ear and ear canal normal. No tenderness. No foreign bodies. No mastoid tenderness. Tympanic membrane is not injected, not scarred, not perforated, not erythematous, not retracted and not bulging. No hemotympanum. No decreased hearing is noted.   Left Ear: Hearing, tympanic membrane, external ear and ear canal normal. No " tenderness. No foreign bodies. No mastoid tenderness. Tympanic membrane is not injected, not scarred, not perforated, not erythematous, not retracted and not bulging. No hemotympanum. No decreased hearing is noted.   Nose: Nose normal. No sinus tenderness, nasal deformity or septal deviation. No epistaxis.  No foreign bodies.   Mouth/Throat: Uvula is midline, oropharynx is clear and moist and mucous membranes are normal. He does not have dentures. Normal dentition. No uvula swelling or dental caries. No oropharyngeal exudate, posterior oropharyngeal edema, posterior oropharyngeal erythema or tonsillar abscesses.   Eyes: Pupils are equal, round, and reactive to light. Conjunctivae, EOM and lids are normal. Right eye exhibits no chemosis, no discharge, no exudate and no hordeolum. No foreign body present in the right eye. Left eye exhibits no chemosis, no discharge, no exudate and no hordeolum. No foreign body present in the left eye. No scleral icterus.   Neck: Normal range of motion and full passive range of motion without pain. Neck supple. No thyroid mass and no thyromegaly present.   Cardiovascular: Normal rate, regular rhythm, S1 normal, S2 normal and normal heart sounds. Exam reveals no gallop and no friction rub.   No murmur heard.  Pulmonary/Chest: Effort normal and breath sounds normal. He has no decreased breath sounds. He has no wheezes. He has no rhonchi. He has no rales.   Abdominal: Soft. Normal appearance and bowel sounds are normal. He exhibits no distension, no ascites and no mass. There is no hepatosplenomegaly. There is no tenderness. There is no rigidity, no rebound and no guarding. No hernia.   Musculoskeletal:        Right shoulder: He exhibits tenderness, pain and decreased strength. He exhibits normal range of motion, no bony tenderness, no swelling, no effusion, no crepitus, no deformity, no laceration, no spasm and normal pulse.        Right knee: He exhibits normal range of motion, no  swelling, no effusion, no ecchymosis, no deformity, no laceration, no erythema, normal alignment, no LCL laxity, normal patellar mobility, no bony tenderness, normal meniscus and no MCL laxity. Tenderness found. Medial joint line and lateral joint line tenderness noted. No MCL, no LCL and no patellar tendon tenderness noted.        Left knee: He exhibits normal range of motion, no swelling, no effusion, no ecchymosis, no deformity, no laceration, no erythema, normal alignment, no LCL laxity, no bony tenderness, normal meniscus and no MCL laxity. No tenderness found. No medial joint line, no lateral joint line, no MCL, no LCL and no patellar tendon tenderness noted.        Cervical back: He exhibits tenderness and pain. He exhibits normal range of motion, no bony tenderness, no swelling, no edema, no deformity, no laceration, no spasm and normal pulse.        Lumbar back: He exhibits decreased range of motion, tenderness and pain. He exhibits no bony tenderness, no swelling, no edema, no deformity, no laceration, no spasm and normal pulse.        Right foot: There is tenderness and bony tenderness. There is normal range of motion, no swelling, normal capillary refill, no crepitus, no deformity and no laceration.   Lymphadenopathy:        Head (right side): No submental, no submandibular, no preauricular and no posterior auricular adenopathy present.        Head (left side): No submental, no submandibular, no preauricular and no posterior auricular adenopathy present.     He has no cervical adenopathy.   Neurological: He is alert and oriented to person, place, and time. He has normal strength. He displays no atrophy and no tremor. No cranial nerve deficit or sensory deficit. He exhibits normal muscle tone. He displays no seizure activity.   Skin: Skin is warm, dry and intact. No rash noted. He is not diaphoretic. No pallor.   Psychiatric: He has a normal mood and affect. His speech is normal and behavior is normal.  Judgment and thought content normal. Cognition and memory are normal. He is attentive.   Vitals reviewed.      Assessment & Plan    Discussion of plan of care including treatment options regarding health and wellness were reviewed and discussed with patient.  Any changes to medication or treatment plan, as well as any screening blood test, imaging, or referrals to specialist, are documented.  Follow up as indicated.     1. Essential hypertension  Patient was counseled and encouraged to maintain a low sodium diet, as well as increasing physical activity.  Recommend random BP checks at home on a regular basis.  Repeat BP at end of visit was not necessary. Will continue medication at this time, and follow up in 3-6 months, or sooner if blood pressure begins to increase.       2. Lumbosacral neuritis  Stable; no therapeutic changes at this time.  The current medical regimen is effective at this time and no changes to present plan or medications will be made at this visit.     - DULoxetine (CYMBALTA) 60 MG capsule; Take 1 capsule (60 mg total) by mouth once daily.  Dispense: 90 capsule; Refill: 1  - meloxicam (MOBIC) 15 MG tablet; Take 1 tablet (15 mg total) by mouth once daily.  Dispense: 30 tablet; Refill: 5    3. Osteoarthritis of spine with radiculopathy, lumbar region  Patient is advised that arthritis is a result of regular daily use, and is caused by inflammation in the joint.  Patient was encouraged to exercise as tolerated, and attempt weight loss with sensible diet and lifestyle changes.  Patient was recommended to continue NSAID therapy to reduce inflammation, and to incorporate stretching into a daily routine.  Pain medication will be prescribed as necessary.  Patient should follow up if pain is not well controlled.   - meloxicam (MOBIC) 15 MG tablet; Take 1 tablet (15 mg total) by mouth once daily.  Dispense: 30 tablet; Refill: 5    4. Primary osteoarthritis of both knees  Patient is advised that arthritis  is a result of regular daily use, and is caused by inflammation in the joint.  Patient was encouraged to exercise as tolerated, and attempt weight loss with sensible diet and lifestyle changes.  Patient was recommended to continue NSAID therapy to reduce inflammation, and to incorporate stretching into a daily routine.  Pain medication will be prescribed as necessary.  Patient should follow up if pain is not well controlled.   - meloxicam (MOBIC) 15 MG tablet; Take 1 tablet (15 mg total) by mouth once daily.  Dispense: 30 tablet; Refill: 5    5. Acute pain of right foot  Will order imaging to further evaluate and manage accordingly.  Likely acute tendonitis or arthritis with no reported injury.  - X-Ray Foot Complete Right; Future    6. Acute pain of right shoulder  Will order imaging to further evaluate and manage accordingly.   - X-ray Shoulder 2 or More Views Right; Future    7. Need for tetanus booster  Recommended vaccines were given to boost immunity and prevent spread of communicable diseases.   - (In Office Administered) Tdap Vaccine       Follow up in about 3 months (around 8/23/2019), or if symptoms worsen or fail to improve.        ACTIVE MEDICAL ISSUES:  Documented in Problem List    PAST MEDICAL HISTORY  Documented    PAST SURGICAL HISTORY:  Documented    SOCIAL HISTORY:  Documented    FAMILY HISTORY:  Documented    ALLERGIES AND MEDICATIONS: updated and reviewed.  Documented    Health Maintenance       Date Due Completion Date    TETANUS VACCINE 09/17/1968 ---    Shingles Vaccine (1 of 2) 09/17/2000 ---    Influenza Vaccine 08/01/2019 1/11/2019    Override on 1/14/2016: Done    Lipid Panel 02/04/2020 2/4/2019    Colonoscopy 09/13/2021 9/13/2016

## 2019-06-10 DIAGNOSIS — I10 ESSENTIAL HYPERTENSION: ICD-10-CM

## 2019-06-11 RX ORDER — AMLODIPINE AND OLMESARTAN MEDOXOMIL 10; 40 MG/1; MG/1
TABLET ORAL
Qty: 30 TABLET | Refills: 5 | Status: SHIPPED | OUTPATIENT
Start: 2019-06-11 | End: 2019-08-08

## 2019-06-17 ENCOUNTER — TELEPHONE (OUTPATIENT)
Dept: FAMILY MEDICINE | Facility: CLINIC | Age: 69
End: 2019-06-17

## 2019-06-17 NOTE — TELEPHONE ENCOUNTER
Spoke to patient. States he is still having trouble with the pain in his right leg and foot. Would like a referral to Ortho. Also states he would like results from X-Ray on 5/23/2019.Please advise.

## 2019-06-17 NOTE — TELEPHONE ENCOUNTER
----- Message from Sudha Kat sent at 6/17/2019 11:15 AM CDT -----  Contact: Wife-   Type:  Patient Requesting Referral    Who Called: self     Referral to What Specialty: Ortho     Reason for Referral: Leg pain     Does the patient want the referral with a specific physician?:Orthopedic     [facility]   Would the patient rather a call back or a response via [facility]? Call     Best Call Back Number: [phone] or [phone]      Patient is asking for results for X-ray, May 23, 2019

## 2019-06-24 ENCOUNTER — OFFICE VISIT (OUTPATIENT)
Dept: ORTHOPEDICS | Facility: CLINIC | Age: 69
End: 2019-06-24
Payer: COMMERCIAL

## 2019-06-24 VITALS
WEIGHT: 250 LBS | HEART RATE: 68 BPM | DIASTOLIC BLOOD PRESSURE: 68 MMHG | BODY MASS INDEX: 39.24 KG/M2 | SYSTOLIC BLOOD PRESSURE: 114 MMHG | HEIGHT: 67 IN

## 2019-06-24 DIAGNOSIS — M76.61 TENDONITIS, ACHILLES, RIGHT: Primary | ICD-10-CM

## 2019-06-24 PROCEDURE — 1101F PT FALLS ASSESS-DOCD LE1/YR: CPT | Mod: CPTII,S$GLB,, | Performed by: ORTHOPAEDIC SURGERY

## 2019-06-24 PROCEDURE — 99213 PR OFFICE/OUTPT VISIT, EST, LEVL III, 20-29 MIN: ICD-10-PCS | Mod: S$GLB,,, | Performed by: ORTHOPAEDIC SURGERY

## 2019-06-24 PROCEDURE — 3074F SYST BP LT 130 MM HG: CPT | Mod: CPTII,S$GLB,, | Performed by: ORTHOPAEDIC SURGERY

## 2019-06-24 PROCEDURE — 1101F PR PT FALLS ASSESS DOC 0-1 FALLS W/OUT INJ PAST YR: ICD-10-PCS | Mod: CPTII,S$GLB,, | Performed by: ORTHOPAEDIC SURGERY

## 2019-06-24 PROCEDURE — 99999 PR PBB SHADOW E&M-EST. PATIENT-LVL III: CPT | Mod: PBBFAC,,, | Performed by: ORTHOPAEDIC SURGERY

## 2019-06-24 PROCEDURE — 3074F PR MOST RECENT SYSTOLIC BLOOD PRESSURE < 130 MM HG: ICD-10-PCS | Mod: CPTII,S$GLB,, | Performed by: ORTHOPAEDIC SURGERY

## 2019-06-24 PROCEDURE — 99999 PR PBB SHADOW E&M-EST. PATIENT-LVL III: ICD-10-PCS | Mod: PBBFAC,,, | Performed by: ORTHOPAEDIC SURGERY

## 2019-06-24 PROCEDURE — 99213 OFFICE O/P EST LOW 20 MIN: CPT | Mod: S$GLB,,, | Performed by: ORTHOPAEDIC SURGERY

## 2019-06-24 PROCEDURE — 3078F DIAST BP <80 MM HG: CPT | Mod: CPTII,S$GLB,, | Performed by: ORTHOPAEDIC SURGERY

## 2019-06-24 PROCEDURE — 3078F PR MOST RECENT DIASTOLIC BLOOD PRESSURE < 80 MM HG: ICD-10-PCS | Mod: CPTII,S$GLB,, | Performed by: ORTHOPAEDIC SURGERY

## 2019-06-24 NOTE — PROGRESS NOTES
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 The pain has been present for 6-8 weeks. There is no history of trauma. The patient describes the pain as achy.  The pain is worse with activity and in the mornings and improved by rest. There is no associated numbness and tingling.  Prior treatments have included meloxicam, which has helped. Had an open ankle fracture treated 25 years ago.      PAST MEDICAL/SURGICAL HISTORY:  Past Medical History:   Diagnosis Date    Arthritis     BPH (benign prostatic hypertrophy)     Cancer     prostate    Colon polyps     Elevated PSA     Groin abscess     Hypertension     Joint pain     Lumbar radiculopathy     Obstructive sleep apnea (adult) (pediatric)     CPAP hs    PONV (postoperative nausea and vomiting)     Prostate cancer     Spinal stenosis      Past Surgical History:   Procedure Laterality Date    ARTHROPLASTY-KNEE Right 8/19/2015    [facility] OR 2ND FLR    BLOCK, NERVE, FACET JOINT, LUMBAR Bilateral 4/3/2014    [facility]    CERVICAL FUSION  1/27/2009    C3-4 fusion    COLONOSCOPY N/A 9/13/2016    Performed by ROWENA Ahn MD at [facility] ENDO (4TH FLR)    INJECTION-STEROID-EPIDURAL-LUMBAR N/A 12/4/2014    [facility]    INJECTION-STEROID-EPIDURAL-LUMBAR N/A 5/22/2014    [facility]    INJECTION-STEROID-EPIDURAL-TRANSFORAMINAL Bilateral 11/4/2013    [facility]    KNEE SURGERY Left 4-13-15    [facility]    KNEE SURGERY Right 8-19-15    [facility]    LAMINECTOMY, SPINE, MINIMALLY INVASIVE Left 4/1/2013    Performed by Pradeep [facility]o [facility] at [facility] OR 2ND FLR    LUMBAR DISCECTOMY  12/3/2009    L5-S1 microdiscex     "PROSTATECTOMY  08/16/2017    REPLACEMENT-KNEE-TOTAL Left 4/13/2015    [facility]    ROBOTIC ASSISTED LAPAROSCOPIC PROSTATECTOMY; lymphadenectomy N/A 8/16/2017    [facility]    SPINE SURGERY  04/01/13    L4/5 laminectomy    TONSILLECTOMY         Current Medications:   Current Outpatient Medications:     amlodipine-olmesartan (JUDY) 10-40 mg per tablet, TAKE 1 TABLET BY MOUTH EVERY DAY, Disp: 30 tablet, Rfl: 5    clotrimazole (LOTRIMIN) 1 % cream, Apply to affected area 2 times daily, Disp: 15 g, Rfl: 2    docusate sodium (COLACE) 100 MG capsule, Take 1 capsule (100 mg total) by mouth 2 (two) times daily as needed for Constipation., Disp: 60 capsule, Rfl: 1    DULoxetine (CYMBALTA) 60 MG capsule, Take 1 capsule (60 mg total) by mouth once daily., Disp: 90 capsule, Rfl: 1    fluocinonide (LIDEX) 0.05 % ointment, , Disp: , Rfl:     gabapentin (NEURONTIN) 600 MG tablet, Take 1 tablet (600 mg total) by mouth 3 (three) times daily. 1 Tablet Oral Three times a day, Disp: 90 tablet, Rfl: 5    levocetirizine (XYZAL) 5 MG tablet, Take 1 tablet (5 mg total) by mouth every evening., Disp: 30 tablet, Rfl: 1    meloxicam (MOBIC) 15 MG tablet, Take 1 tablet (15 mg total) by mouth once daily., Disp: 30 tablet, Rfl: 5    OMEGA 3,6,9 COMBINATION NO.7 (OMEGA DHA ORAL), Take 1 capsule by mouth 2 (two) times daily., Disp: , Rfl:     oxybutynin (DITROPAN-XL) 5 MG TR24, Take 1 tablet (5 mg total) by mouth once daily., Disp: 30 tablet, Rfl: 11    sildenafil, antihypertensive, (REVATIO) 20 mg Tab, Take 1 tablet (20 mg total) by mouth as needed (Take 2 to 5 tablets as needed for activity). Disregard."take one tablet", Disp: 90 tablet, Rfl: 11    Social History:   Social History     Socioeconomic History    Marital status:      Spouse name: Not on file    Number of children: Not on file    Years of education: Not on file    Highest education " "level: Not on file   Occupational History    Not on file   Social Needs    Financial resource strain: Not on file    Food insecurity:     Worry: Not on file     Inability: Not on file    Transportation needs:     Medical: Not on file     Non-medical: Not on file   Tobacco Use    Smoking status: Never Smoker    Smokeless tobacco: Never Used   Substance and Sexual Activity    Alcohol use: Yes     Alcohol/week: 0.6 oz     Types: 1 Glasses of wine per week     Comment: occasional    Drug use: No    Sexual activity: Yes     Partners: Female   Lifestyle    Physical activity:     Days per week: Not on file     Minutes per session: Not on file    Stress: Not on file   Relationships    Social connections:     Talks on phone: Not on file     Gets together: Not on file     Attends Islam service: Not on file     Active member of club or organization: Not on file     Attends meetings of clubs or organizations: Not on file     Relationship status: Not on file   Other Topics Concern    Not on file   Social History Narrative    Not on file       REVIEW OF SYSTEMS:  Constitution: Negative. Negative for chills, fever and night sweats.   Cardiovascular: Negative for chest pain and syncope.   Respiratory: Negative for cough and shortness of breath.   Gastrointestinal: See HPI. Negative for nausea/vomiting. Negative for abdominal pain.  Genitourinary: See HPI. Negative for discoloration or dysuria.  Skin: Negative for dry skin, itching and rash.   Hematologic/Lymphatic: Negative for bleeding problem. Does not bruise/bleed easily.   Musculoskeletal: Negative for falls and muscle weakness.   Neurological: See HPI. No seizures.   Endocrine: Negative for polydipsia, polyphagia and polyuria.   Allergic/Immunologic: Negative for hives and persistent infections.    PHYSICAL EXAMINATION:    /68 (BP Location: Right arm, Patient Position: Sitting, BP Method: Large (Automatic))   Pulse 68   Ht 5' 7" (1.702 m)   Wt 113.4 " "kg (250 lb)   BMI 39.16 kg/m²     General: The patient is a 68 y.o. male in no apparent distress, the patient is orientatied to person, place and time.   Psych: Normal mood and affect  HEENT:  NCAT  Lungs:  Respirations are equal and unlabored.  CV:  2+ bilateral upper and lower extremity pulses.  Skin:  Intact throughout.    MSK:  RLE:  Prior surgical and traumatic scars to right medial distal tibia  Tenderness over dorsal distal metatarsals 3 and 4  Tenderness over Achilles insertion  Pain over Achilles insertion with plantarflexion of foot  Mild limitation in ankle and subtalar motion compared to contralateral, painless ankle and subtalar motion    IMAGING:      Prior radiographs of the right foot were personally reviewed with the patient today.  They show mild osteoarthritis changes of ankle and subtalar joint.     ASSESSMENT/PLAN:    Diagnoses and all orders for this visit:    Tendonitis, Achilles, right      - Boot immobilization  - Continue meloxicam  - Forefoot pain may be secondary to compensation for Achilles tendonitis  - Follow-up in 4 weeks with x-rays of right ankle  - If better, will likely start PT for "weakness" in ankle    I have personally taken the history and examined this patient and agree with the residents note as stated above.      "

## 2019-07-22 ENCOUNTER — OFFICE VISIT (OUTPATIENT)
Dept: ORTHOPEDICS | Facility: CLINIC | Age: 69
End: 2019-07-22
Payer: MEDICARE

## 2019-07-22 ENCOUNTER — HOSPITAL ENCOUNTER (OUTPATIENT)
Dept: RADIOLOGY | Facility: HOSPITAL | Age: 69
Discharge: HOME | End: 2019-07-22
Attending: ORTHOPAEDIC SURGERY
Payer: MEDICARE

## 2019-07-22 DIAGNOSIS — M77.41 METATARSALGIA OF RIGHT FOOT: ICD-10-CM

## 2019-07-22 DIAGNOSIS — M76.61 TENDONITIS, ACHILLES, RIGHT: Primary | ICD-10-CM

## 2019-07-22 DIAGNOSIS — M19.079 ANKLE ARTHRITIS: ICD-10-CM

## 2019-07-22 DIAGNOSIS — M76.61 TENDONITIS, ACHILLES, RIGHT: ICD-10-CM

## 2019-07-22 PROCEDURE — 73610 X-RAY EXAM OF ANKLE: CPT | Mod: 26,RT,, | Performed by: RADIOLOGY

## 2019-07-22 PROCEDURE — 99213 PR OFFICE/OUTPT VISIT, EST, LEVL III, 20-29 MIN: ICD-10-PCS | Mod: S$GLB,,, | Performed by: ORTHOPAEDIC SURGERY

## 2019-07-22 PROCEDURE — 73610 XR ANKLE COMPLETE 3 VIEW RIGHT: ICD-10-PCS | Mod: 26,RT,, | Performed by: RADIOLOGY

## 2019-07-22 PROCEDURE — 99213 OFFICE O/P EST LOW 20 MIN: CPT | Mod: S$GLB,,, | Performed by: ORTHOPAEDIC SURGERY

## 2019-07-22 PROCEDURE — 99999 PR PBB SHADOW E&M-EST. PATIENT-LVL II: ICD-10-PCS | Mod: PBBFAC,,, | Performed by: ORTHOPAEDIC SURGERY

## 2019-07-22 PROCEDURE — 99999 PR PBB SHADOW E&M-EST. PATIENT-LVL II: CPT | Mod: PBBFAC,,, | Performed by: ORTHOPAEDIC SURGERY

## 2019-07-22 PROCEDURE — 1101F PR PT FALLS ASSESS DOC 0-1 FALLS W/OUT INJ PAST YR: ICD-10-PCS | Mod: CPTII,S$GLB,, | Performed by: ORTHOPAEDIC SURGERY

## 2019-07-22 PROCEDURE — 73610 X-RAY EXAM OF ANKLE: CPT | Mod: TC,RT

## 2019-07-22 PROCEDURE — 1101F PT FALLS ASSESS-DOCD LE1/YR: CPT | Mod: CPTII,S$GLB,, | Performed by: ORTHOPAEDIC SURGERY

## 2019-07-22 NOTE — PROGRESS NOTES
Bryan Queen  Returns today for follow-up.  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 He was noted to have limited ankle motion related to his previous trauma but did not really complain of any pain related to his ankle. I felt the had some tendinitis and x-rays revealed some calcification near the insertion of his Achilles tendon. X-rays of his right foot did not show any obvious abnormalities in his forefoot or midfoot.  I recommended a course of boot immobilization to see if his Achilles tendon symptoms will cool down.  He reports no significant change in his symptoms in the back of his leg nor in his forefoot.  He states when he was wearing the boot it did feel better.    Examination:  The right foot and ankle reveals significant decreased motion of the right ankle and dorsiflexes only slightly past neutral. He is tender over the right Achilles tendon near the insertion as well as across the ball of his right forefoot.  He is neurovascularly intact. He has no tenderness about his ankle.    X-ray:  I ordered an x-ray of his ankle today which reveal significant joint space narrowing especially anteriorly.  This would account for his decreased dorsiflexion.    Impression:  1.  Right Achilles tendinitis                       2.  Right posttraumatic ankle arthritis, no significant symptoms but decreased dorsiflexion                       3.  Metatarsalgia    Recommendation:  I explained to him that due to his decreased dorsiflexion he is putting added stress on the ball of his foot resulting in his forefoot pain and I suggested getting some soft cushion orthotics.  I want to obtain an MRI scan to evaluate the Achilles tendon. If there is minimal tendinosis I would recommend a course of physical therapy to address  his symptoms and try to loosen up his tendon a bit.  He will return to see me with results of the MRI

## 2019-07-29 ENCOUNTER — HOSPITAL ENCOUNTER (OUTPATIENT)
Dept: RADIOLOGY | Facility: HOSPITAL | Age: 69
Discharge: HOME | End: 2019-07-29
Attending: ORTHOPAEDIC SURGERY
Payer: MEDICARE

## 2019-07-29 DIAGNOSIS — M76.61 TENDONITIS, ACHILLES, RIGHT: ICD-10-CM

## 2019-07-29 PROCEDURE — 73721 MRI ANKLE WITHOUT CONTRAST RIGHT: ICD-10-PCS | Mod: 26,RT,, | Performed by: RADIOLOGY

## 2019-07-29 PROCEDURE — 73721 MRI JNT OF LWR EXTRE W/O DYE: CPT | Mod: TC,RT

## 2019-07-29 PROCEDURE — 73721 MRI JNT OF LWR EXTRE W/O DYE: CPT | Mod: 26,RT,, | Performed by: RADIOLOGY

## 2019-08-08 ENCOUNTER — HOSPITAL ENCOUNTER (EMERGENCY)
Facility: HOSPITAL | Age: 69
Discharge: HOME | End: 2019-08-08
Attending: EMERGENCY MEDICINE
Payer: MEDICARE

## 2019-08-08 VITALS
BODY MASS INDEX: 40.02 KG/M2 | HEIGHT: 67 IN | WEIGHT: 255 LBS | TEMPERATURE: 98 F | OXYGEN SATURATION: 100 % | SYSTOLIC BLOOD PRESSURE: 119 MMHG | HEART RATE: 75 BPM | RESPIRATION RATE: 18 BRPM | DIASTOLIC BLOOD PRESSURE: 65 MMHG

## 2019-08-08 DIAGNOSIS — I95.9 HYPOTENSION, UNSPECIFIED HYPOTENSION TYPE: ICD-10-CM

## 2019-08-08 DIAGNOSIS — R42 DIZZINESS: ICD-10-CM

## 2019-08-08 DIAGNOSIS — R42 VERTIGO: Primary | ICD-10-CM

## 2019-08-08 DIAGNOSIS — N17.9 AKI (ACUTE KIDNEY INJURY): ICD-10-CM

## 2019-08-08 LAB
ALBUMIN SERPL BCP-MCNC: 3.9 G/DL (ref 3.5–5.2)
ALP SERPL-CCNC: 73 U/L (ref 55–135)
ALT SERPL W/O P-5'-P-CCNC: 15 U/L (ref 10–44)
ANION GAP SERPL CALC-SCNC: 8 MMOL/L (ref 8–16)
AST SERPL-CCNC: 12 U/L (ref 10–40)
BACTERIA #/AREA URNS HPF: ABNORMAL /HPF
BASOPHILS # BLD AUTO: 0.05 K/UL (ref 0–0.2)
BASOPHILS NFR BLD: 0.5 % (ref 0–1.9)
BILIRUB SERPL-MCNC: 0.6 MG/DL (ref 0.1–1)
BILIRUB UR QL STRIP: NEGATIVE
BUN SERPL-MCNC: 19 MG/DL (ref 8–23)
CALCIUM SERPL-MCNC: 9.3 MG/DL (ref 8.7–10.5)
CHLORIDE SERPL-SCNC: 101 MMOL/L (ref 95–110)
CLARITY UR: ABNORMAL
CO2 SERPL-SCNC: 27 MMOL/L (ref 23–29)
COLOR UR: ABNORMAL
CREAT SERPL-MCNC: 1.8 MG/DL (ref 0.5–1.4)
DIFFERENTIAL METHOD: ABNORMAL
EOSINOPHIL # BLD AUTO: 0.1 K/UL (ref 0–0.5)
EOSINOPHIL NFR BLD: 0.9 % (ref 0–8)
ERYTHROCYTE [DISTWIDTH] IN BLOOD BY AUTOMATED COUNT: 14 % (ref 11.5–14.5)
EST. GFR  (AFRICAN AMERICAN): 44 ML/MIN/1.73 M^2
EST. GFR  (NON AFRICAN AMERICAN): 38 ML/MIN/1.73 M^2
GLUCOSE SERPL-MCNC: 102 MG/DL (ref 70–110)
GLUCOSE SERPL-MCNC: 104 MG/DL (ref 70–110)
GLUCOSE UR QL STRIP: NEGATIVE
HCT VFR BLD AUTO: 38.5 % (ref 40–54)
HGB BLD-MCNC: 12.4 G/DL (ref 14–18)
HGB UR QL STRIP: NEGATIVE
HYALINE CASTS #/AREA URNS LPF: 10 /LPF
KETONES UR QL STRIP: NEGATIVE
LACTATE SERPL-SCNC: 0.8 MMOL/L (ref 0.5–2.2)
LEUKOCYTE ESTERASE UR QL STRIP: ABNORMAL
LYMPHOCYTES # BLD AUTO: 2.4 K/UL (ref 1–4.8)
LYMPHOCYTES NFR BLD: 25.1 % (ref 18–48)
MCH RBC QN AUTO: 29.8 PG (ref 27–31)
MCHC RBC AUTO-ENTMCNC: 32.2 G/DL (ref 32–36)
MCV RBC AUTO: 93 FL (ref 82–98)
MICROSCOPIC COMMENT: ABNORMAL
MONOCYTES # BLD AUTO: 1.5 K/UL (ref 0.3–1)
MONOCYTES NFR BLD: 15.6 % (ref 4–15)
NEUTROPHILS # BLD AUTO: 5.6 K/UL (ref 1.8–7.7)
NEUTROPHILS NFR BLD: 58.2 % (ref 38–73)
NITRITE UR QL STRIP: NEGATIVE
PH UR STRIP: 6 [PH] (ref 5–8)
PLATELET # BLD AUTO: 319 K/UL (ref 150–350)
PMV BLD AUTO: 9.1 FL (ref 9.2–12.9)
POCT GLUCOSE: 104 MG/DL (ref 70–110)
POTASSIUM SERPL-SCNC: 4.3 MMOL/L (ref 3.5–5.1)
PROT SERPL-MCNC: 7.8 G/DL (ref 6–8.4)
PROT UR QL STRIP: ABNORMAL
RBC # BLD AUTO: 4.16 M/UL (ref 4.6–6.2)
RBC #/AREA URNS HPF: 3 /HPF (ref 0–4)
SODIUM SERPL-SCNC: 136 MMOL/L (ref 136–145)
SP GR UR STRIP: 1.02 (ref 1–1.03)
TROPONIN I SERPL DL<=0.01 NG/ML-MCNC: <0.006 NG/ML (ref 0–0.03)
URN SPEC COLLECT METH UR: ABNORMAL
UROBILINOGEN UR STRIP-ACNC: ABNORMAL EU/DL
WBC # BLD AUTO: 9.68 K/UL (ref 3.9–12.7)
WBC #/AREA URNS HPF: 35 /HPF (ref 0–5)

## 2019-08-08 PROCEDURE — 63600175 PHARM REV CODE 636 W HCPCS: Performed by: NURSE PRACTITIONER

## 2019-08-08 PROCEDURE — 87088 URINE BACTERIA CULTURE: CPT

## 2019-08-08 PROCEDURE — 87077 CULTURE AEROBIC IDENTIFY: CPT

## 2019-08-08 PROCEDURE — 96360 HYDRATION IV INFUSION INIT: CPT

## 2019-08-08 PROCEDURE — 85025 COMPLETE CBC W/AUTO DIFF WBC: CPT

## 2019-08-08 PROCEDURE — 81000 URINALYSIS NONAUTO W/SCOPE: CPT

## 2019-08-08 PROCEDURE — 63600175 PHARM REV CODE 636 W HCPCS: Performed by: EMERGENCY MEDICINE

## 2019-08-08 PROCEDURE — 87086 URINE CULTURE/COLONY COUNT: CPT

## 2019-08-08 PROCEDURE — 80053 COMPREHEN METABOLIC PANEL: CPT

## 2019-08-08 PROCEDURE — 82962 GLUCOSE BLOOD TEST: CPT

## 2019-08-08 PROCEDURE — 83605 ASSAY OF LACTIC ACID: CPT

## 2019-08-08 PROCEDURE — 87186 SC STD MICRODIL/AGAR DIL: CPT

## 2019-08-08 PROCEDURE — 93010 EKG 12-LEAD: ICD-10-PCS | Mod: ,,, | Performed by: INTERNAL MEDICINE

## 2019-08-08 PROCEDURE — 84484 ASSAY OF TROPONIN QUANT: CPT

## 2019-08-08 PROCEDURE — 96361 HYDRATE IV INFUSION ADD-ON: CPT

## 2019-08-08 PROCEDURE — 99285 EMERGENCY DEPT VISIT HI MDM: CPT | Mod: 25

## 2019-08-08 PROCEDURE — 93010 ELECTROCARDIOGRAM REPORT: CPT | Mod: ,,, | Performed by: INTERNAL MEDICINE

## 2019-08-08 PROCEDURE — 93005 ELECTROCARDIOGRAM TRACING: CPT

## 2019-08-08 RX ORDER — ONDANSETRON 2 MG/ML
4 INJECTION INTRAMUSCULAR; INTRAVENOUS
Status: DISCONTINUED | OUTPATIENT
Start: 2019-08-08 | End: 2019-08-08

## 2019-08-08 RX ORDER — SODIUM CHLORIDE 9 MG/ML
1000 INJECTION, SOLUTION INTRAVENOUS
Status: COMPLETED | OUTPATIENT
Start: 2019-08-08 | End: 2019-08-08

## 2019-08-08 RX ORDER — MECLIZINE HCL 12.5 MG 12.5 MG/1
12.5 TABLET ORAL 3 TIMES DAILY PRN
Qty: 20 TABLET | Refills: 0 | Status: SHIPPED | OUTPATIENT
Start: 2019-08-08 | End: 2021-05-18

## 2019-08-08 RX ORDER — AMLODIPINE AND OLMESARTAN MEDOXOMIL 5; 20 MG/1; MG/1
1 TABLET ORAL DAILY
Qty: 15 TABLET | Refills: 0 | Status: SHIPPED | OUTPATIENT
Start: 2019-08-08 | End: 2019-08-24 | Stop reason: SDUPTHER

## 2019-08-08 RX ADMIN — SODIUM CHLORIDE 1000 ML: 0.9 INJECTION, SOLUTION INTRAVENOUS at 06:08

## 2019-08-08 RX ADMIN — SODIUM CHLORIDE 1000 ML: 0.9 INJECTION, SOLUTION INTRAVENOUS at 05:08

## 2019-08-08 NOTE — ED PROVIDER NOTES
Encounter Date: 8/8/2019    SCRIBE #1 NOTE: I, Arely Brock, am scribing for, and in the presence of,  Guilherme Tsang MD. I have scribed the following portions of the note - Other sections scribed: HPI, ROS, PE.       History     Chief Complaint   Patient presents with    Dizziness     Pt reports dizziness that began on yesterday with nausea and vomiting earlier today.      CC: Dizziness    68 year old male  has a past medical history of Arthritis, prostate Cancer, HTN, presents to the ED for evaluation of dizziness, nausea, and tinnitus that began at rest yesterday. Pt took his daughter's Dramamine which provide transient relief yesterday. Pt reports his symptoms worsened throughout the day yesterday. He also reports a subjective fever/chills last night and diaphoresis and nausea prior to attempted to eat dinner last night. He did not eat dinner last night but ate a small breakfast this morning. Pt reports a history of similar symptoms when he had an inner ear infection 20 years ago. Pt's dizziness is alleviated w/ laying down and exacerbated w/ standing up. Pt denies chest pain, shortness of breath, sore throat, headache, changes in vision, abdominal pain, diarrhea, and dysuria. Pt reports he believes he has not been drinking enough water throughout the day. Pt is compliant w/ medications. No other symptoms reported.    The history is provided by the patient. No  was used.     Review of patient's allergies indicates:   Allergen Reactions    Hydroxyzine Nausea And Vomiting and Other (See Comments)     SWEATING    Lyrica [pregabalin] Rash    Mobic [meloxicam] Nausea And Vomiting     Tinnitus    Tramadol Nausea And Vomiting     Nauseous,Sweating, Ringing in the ears    Vicodin  [hydrocodone-acetaminophen]      Other reaction(s): Rash     Past Medical History:   Diagnosis Date    Arthritis     BPH (benign prostatic hypertrophy)     Cancer     prostate    Colon polyps     Elevated PSA      Groin abscess     Hypertension     Joint pain     Lumbar radiculopathy     Obstructive sleep apnea (adult) (pediatric)     CPAP hs    PONV (postoperative nausea and vomiting)     Prostate cancer     Spinal stenosis      Past Surgical History:   Procedure Laterality Date    ARTHROPLASTY-KNEE Right 8/19/2015    [facility] OR 2ND FLR    BLOCK, NERVE, FACET JOINT, LUMBAR Bilateral 4/3/2014    Performed by Annalisa Posadas [facility] at [facility] PAIN MGT    CERVICAL FUSION  1/27/2009    C3-4 fusion    COLONOSCOPY N/A 9/13/2016    Performed by ROWENA Ahn MD at [facility] ENDO (4TH FLR)    INJECTION-STEROID-EPIDURAL-LUMBAR N/A 12/4/2014    Performed by [facility]isa [facility]adas [facility] at [facility] PAIN MGT    INJECTION-STEROID-EPIDURAL-LUMBAR N/A 5/22/2014    Performed by [facility] at [facility] PAIN MGT    INJECTION-STEROID-EPIDURAL-TRANSFORAMINAL Bilateral 11/4/2013    [facility]    KNEE SURGERY Left 4-13-15    TK    KNEE SURGERY Right 8-19-15    TK    LAMINECTOMY, SPINE, MINIMALLY INVASIVE Left 4/1/2013    Performed by Pradeep [facility]royo MD at [facility] OR 2ND FLR    LUMBAR DISCECTOMY  12/3/2009    L5-S1 microdiscex    PROSTATECTOMY  08/16/2017    REPLACEMENT-KNEE-TOTAL Left 4/13/2015    [facility] OR 2ND FLR    ROBOTIC ASSISTED LAPAROSCOPIC PROSTATECTOMY; lymphadenectomy N/A 8/16/2017    Performed by Kieran Evans MD at [facility] OR    SPINE SURGERY  04/01/13    L4/5 laminectomy    TONSILLECTOMY       Family History   Problem Relation Age of Onset    Diabetes Mother     Hypertension Mother     Diabetes Sister     Diabetes Brother     Melanoma Neg Hx      Social History     Tobacco Use    Smoking status: Never Smoker    Smokeless tobacco: Never Used   Substance Use Topics    Alcohol use: Yes     Comment: occasional    Drug use: No     Review of Systems   Constitutional: Positive for chills and fever (subjective).   HENT:  Positive for tinnitus. Negative for rhinorrhea and sore throat.    Eyes: Negative for redness.   Respiratory: Negative for cough.    Cardiovascular: Negative for chest pain.   Gastrointestinal: Positive for nausea. Negative for abdominal pain, diarrhea and vomiting.   Genitourinary: Negative for dysuria.   Musculoskeletal: Negative for back pain.   Skin: Negative for color change.   Neurological: Positive for dizziness. Negative for syncope and weakness.   Psychiatric/Behavioral: The patient is not nervous/anxious.        Physical Exam     Initial Vitals [08/08/19 1606]   BP Pulse Resp Temp SpO2   (!) 97/56 75 19 98.2 °F (36.8 °C) 95 %      MAP       --         Physical Exam    Nursing note and vitals reviewed.  Constitutional: He is not diaphoretic. No distress.   HENT:   Head: Normocephalic and atraumatic.   Right Ear: Tympanic membrane normal.   Left Ear: Tympanic membrane normal.   Mouth/Throat: Oropharynx is clear and moist.   Eyes: Conjunctivae and EOM are normal. Pupils are equal, round, and reactive to light. No scleral icterus. Right eye exhibits no nystagmus. Left eye exhibits no nystagmus.   Neck: Normal range of motion. Neck supple. No JVD present.   Cardiovascular: Normal rate, regular rhythm and intact distal pulses.   Pulmonary/Chest: Breath sounds normal. No stridor. No respiratory distress.   Abdominal: Soft. Bowel sounds are normal. He exhibits no distension. There is no tenderness.   Musculoskeletal: Normal range of motion. He exhibits edema (symmetrical lower extremity). He exhibits no tenderness.   Neurological: He is alert and oriented to person, place, and time. He has normal strength. No cranial nerve deficit or sensory deficit.   Skin: Skin is warm and dry. No rash noted.   Psychiatric: He has a normal mood and affect.         ED Course   Procedures  Labs Reviewed   CBC W/ AUTO DIFFERENTIAL - Abnormal; Notable for the following components:       Result Value    RBC 4.16 (*)     Hemoglobin  12.4 (*)     Hematocrit 38.5 (*)     MPV 9.1 (*)     Mono # 1.5 (*)     Mono% 15.6 (*)     All other components within normal limits   COMPREHENSIVE METABOLIC PANEL - Abnormal; Notable for the following components:    Creatinine 1.8 (*)     eGFR if  44 (*)     eGFR if non  38 (*)     All other components within normal limits   URINALYSIS, REFLEX TO URINE CULTURE - Abnormal; Notable for the following components:    Appearance, UA Cloudy (*)     Protein, UA 1+ (*)     Urobilinogen, UA 4.0-6.0 (*)     Leukocytes, UA 2+ (*)     All other components within normal limits   URINALYSIS MICROSCOPIC - Abnormal; Notable for the following components:    WBC, UA 35 (*)     Bacteria Few (*)     Hyaline Casts, UA 10 (*)     All other components within normal limits   CULTURE, URINE   LACTIC ACID, PLASMA   TROPONIN I   POCT GLUCOSE   POCT GLUCOSE MONITORING CONTINUOUS     EKG Readings: (Independently Interpreted)   Initial Reading: No STEMI. Rhythm: Normal Sinus Rhythm. Heart Rate: 69. Conduction: RBBB.     ECG Results          EKG 12-lead (In process)  Result time 08/08/19 17:25:13    In process by Interface, Lab In [facility] (08/08/19 17:25:13)                 Narrative:    Test Reason : R42,    Vent. Rate : 069 BPM     Atrial Rate : 069 BPM     P-R Int : 136 ms          QRS Dur : 144 ms      QT Int : 404 ms       P-R-T Axes : 073 -06 049 degrees     QTc Int : 432 ms    Normal sinus rhythm  Right bundle branch block  Abnormal ECG  When compared with ECG of 08-AUG-2017 11:08,  Significant changes have occurred    Referred By: [email or web address]   SELF           Confirmed By:                   In process by Interface, Lab In [facility] (08/08/19 17:15:17)                 Narrative:    Test Reason : R42,    Vent. Rate : 069 BPM     Atrial Rate : 069 BPM     P-R Int : 136 ms          QRS Dur : 144 ms      QT Int : 404 ms       P-R-T Axes : 073 -06 049 degrees     QTc Int : 432 ms    Normal sinus rhythm  Right bundle  branch block  Abnormal ECG  When compared with ECG of 08-AUG-2017 11:08,  Significant changes have occurred    Referred By: RENETTA   SELF           Confirmed By:                   In process by Interface, Lab In [facility] (08/08/19 17:12:33)                 Narrative:    Test Reason : R42,    Vent. Rate : 069 BPM     Atrial Rate : 069 BPM     P-R Int : 136 ms          QRS Dur : 144 ms      QT Int : 404 ms       P-R-T Axes : 073 -06 049 degrees     QTc Int : 432 ms    Normal sinus rhythm  Right bundle branch block  Abnormal ECG  When compared with ECG of 08-AUG-2017 11:08,  Significant changes have occurred    Referred By: AAAREFSARAH   SELF           Confirmed By:                             Imaging Results          CT Head Without Contrast (Final result)  Result time 08/08/19 18:50:11    Final result by Viktor Lora MD (08/08/19 18:50:11)                 Impression:      1. No acute intracranial abnormalities, noting sequela of chronic microvascular ischemic change and senescent change.      Electronically signed by: Viktor Lora MD  Date:    08/08/2019  Time:    18:50             Narrative:    EXAMINATION:  CT HEAD WITHOUT CONTRAST    CLINICAL HISTORY:  Dizziness;    TECHNIQUE:  Low dose axial images were obtained through the head.  Coronal and sagittal reformations were also performed. Contrast was not administered.    COMPARISON:  MRI 11/30/2004    FINDINGS:  There is generalized cerebral volume loss.  There is hypoattenuation in a periventricular fashion, likely sequela of chronic microvascular ischemic change.  There is no evidence of acute major vascular territory infarct, hemorrhage, or mass.  There is no hydrocephalus.  There are no abnormal extra-axial fluid collections.  The paranasal sinuses and mastoid air cells are clear, and there is no evidence of calvarial fracture.  The visualized soft tissues are unremarkable.                               X-Ray Chest PA And Lateral (Final result)  Result  time 08/08/19 16:55:34    Final result by Sanjay Lawson MD (08/08/19 16:55:34)                 Impression:      No acute intrathoracic processes.      Electronically signed by: Sanjay Lawson MD  Date:    08/08/2019  Time:    16:55             Narrative:    EXAMINATION:  XR CHEST PA AND LATERAL    CLINICAL HISTORY:  Dizziness and giddiness    TECHNIQUE:  PA and lateral views of the chest were performed.    COMPARISON:  None    FINDINGS:  Heart size within normal limits.  Mediastinum is unremarkable.  There is no tracheal abnormalities.    Lungs are well inflated.  There is no lobar consolidations or pneumothorax or pulmonary vascular congestion or pleural effusions.  There are spondylotic changes in the dorsal spine.                              X-Rays:   Independently Interpreted Readings:   Chest X-Ray: No infiltrates.  No acute abnormalities.     Medical Decision Making:   History:   Old Medical Records: I decided to obtain old medical records.  Old Records Summarized: records from clinic visits.       <> Summary of Records: Recent orthopedic visit for Achilles tendinitis  Differential Diagnosis:   Dehydration, orthostasis,  medicine sdie effect, electrolyte abnormality, peripheral vertigo, CVA  Independently Interpreted Test(s):   I have ordered and independently interpreted X-rays - see prior notes.  I have ordered and independently interpreted EKG Reading(s) - see prior notes  Clinical Tests:   Lab Tests: Ordered and Reviewed  Radiological Study: Ordered and Reviewed  Medical Tests: Ordered and Reviewed  ED Management:  Patient is afebrile and in no acute distress at time history and physical.  He has no obvious focal neurological deficits.  EKG is without evidence of STEMI.  Patient is hypotensive at triage sheet.  He has IV fluids in process from EMS.  He does not have significant orthostatic changes on his valid signed after IV hydration received with EMS.  Labs without leukocytosis or granulocytosis to  suggest significant infectious process.  UA with elevated WBCs and few bacteria.  Past urine culture appears to be a contaminant with less than 100,000 colony-forming units.  Unclear if patient's current urinalysis is related to colonization or to UTI.  Urine culture sent.  Lactic acid within normal limits. Chemistry consistent with ANDRES.  Potassium is within normal limits. Troponin is negative. Reassessment patient reports some improvement in his symptoms with IV hydration.  He does report some persistent dizziness.  Patient sent for CT scan of the brain which does not demonstrate acute CVA but does no chronic microvascular ischemic changes.  On reassessment patient reports further improvement symptoms without IV hydration he reports past history of vertigo as well as tendinitis.  Patient is hemodynamically stable and fit for discharge to follow up with his primary physician for monitoring of blood pressure and renal function. Patient has been prescribed a lower dose of his blood pressure medications is he reports that his dosage was recently increased.  Patient has been referred to ENT as well as Neurology to further evaluate his tinnitus and vertigo.  Patient and family member at the bedside counseled on supportive care, appropriate medication usage, concerning symptoms for which to return to ER and the importance of follow up. Understanding and agreement with treatment plan was expressed.   This chart was completed using dictation software, as a result there may be some transcription errors.                I, Guilherme Tsang , personally performed the services described in this documentation. All medical record entries made by the scribe were at my direction and in my presence. I have reviewed the chart and agree that the record reflects my personal performance and is accurate and complete.         Clinical Impression:       ICD-10-CM ICD-9-CM   1. Vertigo R42 780.4   2. Dizziness R42 780.4   3. Hypotension,  unspecified hypotension type I95.9 458.9   4. ANDRES (acute kidney injury) N17.9 584.9         Disposition:   Disposition: Discharged  Condition: Stable                        Guilherme Tsang MD  08/08/19 2001

## 2019-08-08 NOTE — LETTER
08/13/2019      Bryan Queen  [address] [place] [id number]      Visit Date: 8/8/2019        Multiple attempts to contact you via the provided telephone have been unsuccessful. This written correspondence is to inform you that your test results from your recent visit to [facility] have abnormal results. Please return to the Emergency Department immediately or call [phone] and ask to speak with a provider for further information.        Thank you,  Emergency Department Staff  [facility] [address]  ?  [place] [id number]  ?  phone [phone] ?   ?  fax [phone]  ?  [email or web address]

## 2019-08-09 NOTE — DISCHARGE INSTRUCTIONS
Lab testing emergency department demonstrates a mild decrease in your kidney function that may be related to dehydration.  Is important that you drink adequate fluids to remain hydrated. Your blood pressure was low in the emergency department but improved with IV hydration.  You have been prescribed a short course of a lower dose of blood pressure medication.  It is important that you make an appointment to see your primary physician within the next 3-5 days for blood pressure check as well as repeat blood testing to monitor your kidney function and ensure that it is improving.  Use the prescribed meclizine as needed for dizziness.  Do not drive or operate heavy machinery while taking meclizine as it can cause you to become drowsy.  Make an appointment to see ENT as well as Neurology to further evaluate the ringing in your areas as well as your dizziness.  Return to the emergency department for fever, vision changes, numbness, weakness or any new, worsening or concerning symptoms.

## 2019-08-10 LAB — BACTERIA UR CULT: ABNORMAL

## 2019-08-24 ENCOUNTER — NURSE TRIAGE (OUTPATIENT)
Dept: ADMINISTRATIVE | Facility: CLINIC | Age: 69
End: 2019-08-24

## 2019-08-24 RX ORDER — AMLODIPINE AND OLMESARTAN MEDOXOMIL 5; 20 MG/1; MG/1
1 TABLET ORAL DAILY
Qty: 30 TABLET | Refills: 0 | Status: SHIPPED | OUTPATIENT
Start: 2019-08-24 | End: 2019-08-24

## 2019-08-24 RX ORDER — AMLODIPINE AND OLMESARTAN MEDOXOMIL 5; 20 MG/1; MG/1
1 TABLET ORAL DAILY
Qty: 30 TABLET | Refills: 0 | Status: SHIPPED | OUTPATIENT
Start: 2019-08-24 | End: 2019-08-27 | Stop reason: SDUPTHER

## 2019-08-24 NOTE — TELEPHONE ENCOUNTER
"    Reason for Disposition   [1] Request for URGENT new prescription or refill of "essential" medication (i.e., likelihood of harm to patient if not taken) AND [2] triager unable to fill per unit policy    Protocols used: MEDICATION QUESTION CALL-A-AH    Patient is out of Vee. No appt available for Dr. Lombard this week. Explained to Mrs. Queen, I would call on call provider for refill.    Called  who authorized 30 supply of the Vee.       Left Rx on the prescriber vmail.     Advised Mrs. Queen to buy a bp monitor or if they don't have one his size (large arm), check bp at the kiosk in the store. Mrs. Queen verbalized understanding.     The [place] location is closed. Called [facility] to the [facility] at [address] [place] [address].     Left the clarification of pharmacy on the vmail.   "

## 2019-08-25 NOTE — TELEPHONE ENCOUNTER
"  Reason for Disposition   Caller has medication question about med not prescribed by PCP and triager unable to answer question (e.g., compatibility with other med, storage)    Additional Information   Negative: Drug overdose and nurse unable to answer question   Negative: Caller requesting information not related to medicine   Negative: Caller requesting a prescription for Strep throat and has a positive culture result   Negative: Rash while taking a medication or within 3 days of stopping it   Negative: Immunization reaction suspected   Negative: [1] Asthma AND [2] having symptoms of asthma (cough, wheezing, etc)   Negative: MORE THAN A DOUBLE DOSE of a prescription or over-the-counter (OTC) drug   Negative: [1] DOUBLE DOSE (an extra dose or lesser amount) of over-the-counter (OTC) drug AND [2] any symptoms (e.g., dizziness, nausea, pain, sleepiness)   Negative: [1] DOUBLE DOSE (an extra dose or lesser amount) of prescription drug AND [2] any symptoms (e.g., dizziness, nausea, pain, sleepiness)   Negative: Took another person's prescription drug   Negative: [1] DOUBLE DOSE (an extra dose or lesser amount) of prescription drug AND [2] NO symptoms (Exception: a double dose of antibiotics)   Negative: Diabetes drug error or overdose (e.g., insulin or extra dose)   Negative: [1] Request for URGENT new prescription or refill of "essential" medication (i.e., likelihood of harm to patient if not taken) AND [2] triager unable to fill per unit policy   Negative: [1] Prescription not at pharmacy AND [2] was prescribed today by PCP   Negative: Pharmacy calling with prescription questions and triager unable to answer question   Negative: Caller has urgent medication question about med that PCP prescribed and triager unable to answer question   Negative: Caller has NON-URGENT medication question about med that PCP prescribed and triager unable to answer question   Negative: Caller requesting a NON-URGENT new " prescription or refill and triager unable to refill per unit policy    Protocols used: MEDICATION QUESTION CALL-A-AH  Spoke with triager re amlodipine. Wife states Med was sent to pharmacy that is closed. [facility] [place]  Called in at 906pm [place]

## 2019-08-26 ENCOUNTER — TELEPHONE (OUTPATIENT)
Dept: FAMILY MEDICINE | Facility: CLINIC | Age: 69
End: 2019-08-26

## 2019-08-26 NOTE — TELEPHONE ENCOUNTER
Patient spouse very upset on the line to schedule a follow up visit with Dr.Lombard. I was able to schedule patient on tomorrow and asked patient wife to call the clinic with a status on the patient as soon as she get home. Reported patient BP 70/40 yesterday, did not have a reading today.

## 2019-08-26 NOTE — TELEPHONE ENCOUNTER
----- Message from Mechelle Neff sent at 8/26/2019  3:36 PM CDT -----  Contact: Terri/spouse  Type: Patient Call Back    Who called: Dre    What is the request in detail: Calling for Joslyn--states she was unable to get pt's BP b/c he was not home    Can the clinic reply by [facility]? yes    Would the patient rather a call back or a response via [facility]? either    Best call back number: [phone] (home)       Thanks

## 2019-08-27 ENCOUNTER — OFFICE VISIT (OUTPATIENT)
Dept: FAMILY MEDICINE | Facility: CLINIC | Age: 69
End: 2019-08-27
Payer: MEDICARE

## 2019-08-27 ENCOUNTER — TELEPHONE (OUTPATIENT)
Dept: FAMILY MEDICINE | Facility: CLINIC | Age: 69
End: 2019-08-27

## 2019-08-27 VITALS
HEIGHT: 67 IN | SYSTOLIC BLOOD PRESSURE: 108 MMHG | HEART RATE: 83 BPM | BODY MASS INDEX: 39.83 KG/M2 | RESPIRATION RATE: 20 BRPM | WEIGHT: 253.75 LBS | DIASTOLIC BLOOD PRESSURE: 80 MMHG | OXYGEN SATURATION: 96 % | TEMPERATURE: 99 F

## 2019-08-27 DIAGNOSIS — I10 ESSENTIAL HYPERTENSION: Primary | ICD-10-CM

## 2019-08-27 PROCEDURE — 99499 UNLISTED E&M SERVICE: CPT | Mod: S$GLB,,, | Performed by: FAMILY MEDICINE

## 2019-08-27 PROCEDURE — 1101F PT FALLS ASSESS-DOCD LE1/YR: CPT | Mod: CPTII,S$GLB,, | Performed by: FAMILY MEDICINE

## 2019-08-27 PROCEDURE — 99499 RISK ADDL DX/OHS AUDIT: ICD-10-PCS | Mod: S$GLB,,, | Performed by: FAMILY MEDICINE

## 2019-08-27 PROCEDURE — 99999 PR PBB SHADOW E&M-EST. PATIENT-LVL III: ICD-10-PCS | Mod: PBBFAC,,, | Performed by: FAMILY MEDICINE

## 2019-08-27 PROCEDURE — 1101F PR PT FALLS ASSESS DOC 0-1 FALLS W/OUT INJ PAST YR: ICD-10-PCS | Mod: CPTII,S$GLB,, | Performed by: FAMILY MEDICINE

## 2019-08-27 PROCEDURE — 3074F PR MOST RECENT SYSTOLIC BLOOD PRESSURE < 130 MM HG: ICD-10-PCS | Mod: CPTII,S$GLB,, | Performed by: FAMILY MEDICINE

## 2019-08-27 PROCEDURE — 3074F SYST BP LT 130 MM HG: CPT | Mod: CPTII,S$GLB,, | Performed by: FAMILY MEDICINE

## 2019-08-27 PROCEDURE — 99214 OFFICE O/P EST MOD 30 MIN: CPT | Mod: S$GLB,,, | Performed by: FAMILY MEDICINE

## 2019-08-27 PROCEDURE — 99999 PR PBB SHADOW E&M-EST. PATIENT-LVL III: CPT | Mod: PBBFAC,,, | Performed by: FAMILY MEDICINE

## 2019-08-27 PROCEDURE — 3079F PR MOST RECENT DIASTOLIC BLOOD PRESSURE 80-89 MM HG: ICD-10-PCS | Mod: CPTII,S$GLB,, | Performed by: FAMILY MEDICINE

## 2019-08-27 PROCEDURE — 99214 PR OFFICE/OUTPT VISIT, EST, LEVL IV, 30-39 MIN: ICD-10-PCS | Mod: S$GLB,,, | Performed by: FAMILY MEDICINE

## 2019-08-27 PROCEDURE — 3079F DIAST BP 80-89 MM HG: CPT | Mod: CPTII,S$GLB,, | Performed by: FAMILY MEDICINE

## 2019-08-27 RX ORDER — SULFAMETHOXAZOLE AND TRIMETHOPRIM 800; 160 MG/1; MG/1
1 TABLET ORAL 2 TIMES DAILY
Refills: 0 | COMMUNITY
Start: 2019-08-19 | End: 2021-05-18

## 2019-08-27 RX ORDER — AMLODIPINE AND OLMESARTAN MEDOXOMIL 5; 20 MG/1; MG/1
1 TABLET ORAL DAILY
Qty: 90 TABLET | Refills: 1 | Status: SHIPPED | OUTPATIENT
Start: 2019-08-27 | End: 2020-01-07 | Stop reason: SDUPTHER

## 2019-08-27 NOTE — PROGRESS NOTES
Chief Complaint   Patient presents with    Dizziness     8/8/19 hospital follow up        Bryan Queen is a 68 y.o. male who presents per the Chief Complaint.  Pt is known to me and was last seen by me on 5/23/2019.  All known chronic medical issues have been documented.       Hypertension   This is a chronic problem. The current episode started more than 1 year ago. The problem has been gradually worsening since onset. The problem is uncontrolled. Pertinent negatives include no anxiety, blurred vision, chest pain, headaches, malaise/fatigue, neck pain, orthopnea, palpitations, peripheral edema, PND, shortness of breath or sweats. There are no associated agents to hypertension. Risk factors for coronary artery disease include male gender and obesity. Past treatments include calcium channel blockers. The current treatment provides moderate improvement. Compliance problems include exercise and diet.  There is no history of angina, kidney disease, CAD/MI, CVA, heart failure, left ventricular hypertrophy, PVD or retinopathy. There is no history of chronic renal disease, coarctation of the aorta, hyperaldosteronism, hypercortisolism, hyperparathyroidism, a hypertension causing med, pheochromocytoma, renovascular disease, sleep apnea or a thyroid problem.        ROS  Review of Systems   Constitutional: Negative.  Negative for activity change, appetite change, chills, diaphoresis, fatigue, fever, malaise/fatigue, unexpected weight change and weight loss.   HENT: Negative.  Negative for congestion, ear pain, hearing loss, nosebleeds, postnasal drip, rhinorrhea, sinus pressure, sneezing, sore throat and trouble swallowing.    Eyes: Negative for blurred vision, photophobia, pain and visual disturbance.   Respiratory: Negative for cough, choking and shortness of breath.    Cardiovascular: Negative for chest pain, palpitations, orthopnea, leg swelling, syncope and PND.   Gastrointestinal: Negative for abdominal pain, bowel  "incontinence, constipation, diarrhea, nausea and vomiting.   Genitourinary: Negative for bladder incontinence, difficulty urinating, dysuria, frequency, pelvic pain and urgency.   Musculoskeletal: Positive for back pain. Negative for arthralgias, gait problem, joint swelling, myalgias and neck pain.   Skin: Negative.    Allergic/Immunologic: Negative for environmental allergies and food allergies.   Neurological: Positive for dizziness (resolved). Negative for tingling, seizures, syncope, weakness, light-headedness, numbness, headaches and paresthesias.   Psychiatric/Behavioral: Negative.  Negative for confusion, decreased concentration, dysphoric mood and sleep disturbance. The patient is not nervous/anxious.        Physical Exam  Vitals:    08/27/19 1534   BP: 108/80   Pulse: 83   Resp: 20   Temp: 98.8 °F (37.1 °C)    Body mass index is 39.74 kg/m².  Weight: 115.1 kg (253 lb 12 oz)   Height: 5' 7" (170.2 cm)     Physical Exam   Constitutional: He is oriented to person, place, and time. He appears well-developed and well-nourished. He is active and cooperative.  Non-toxic appearance. He does not have a sickly appearance. He does not appear ill. No distress.   HENT:   Head: Normocephalic and atraumatic.   Right Ear: Hearing, tympanic membrane, external ear and ear canal normal. No tenderness. No foreign bodies. No mastoid tenderness. Tympanic membrane is not injected, not scarred, not perforated, not erythematous, not retracted and not bulging. No hemotympanum. No decreased hearing is noted.   Left Ear: Hearing, tympanic membrane, external ear and ear canal normal. No tenderness. No foreign bodies. No mastoid tenderness. Tympanic membrane is not injected, not scarred, not perforated, not erythematous, not retracted and not bulging. No hemotympanum. No decreased hearing is noted.   Nose: Nose normal. No sinus tenderness, nasal deformity or septal deviation. No epistaxis.  No foreign bodies.   Mouth/Throat: Uvula is " midline, oropharynx is clear and moist and mucous membranes are normal. He does not have dentures. Normal dentition. No uvula swelling or dental caries. No oropharyngeal exudate, posterior oropharyngeal edema, posterior oropharyngeal erythema or tonsillar abscesses.   Eyes: Pupils are equal, round, and reactive to light. Conjunctivae, EOM and lids are normal. Right eye exhibits no chemosis, no discharge, no exudate and no hordeolum. No foreign body present in the right eye. Left eye exhibits no chemosis, no discharge, no exudate and no hordeolum. No foreign body present in the left eye. No scleral icterus.   Neck: Normal range of motion and full passive range of motion without pain. Neck supple. No thyroid mass and no thyromegaly present.   Cardiovascular: Normal rate, regular rhythm, S1 normal, S2 normal and normal heart sounds. Exam reveals no gallop and no friction rub.   No murmur heard.  Pulmonary/Chest: Effort normal and breath sounds normal. He has no decreased breath sounds. He has no wheezes. He has no rhonchi. He has no rales.   Abdominal: Soft. Normal appearance and bowel sounds are normal. He exhibits no distension, no ascites and no mass. There is no hepatosplenomegaly. There is no tenderness. There is no rigidity, no rebound and no guarding. No hernia.   Musculoskeletal:        Right shoulder: He exhibits tenderness, pain and decreased strength. He exhibits normal range of motion, no bony tenderness, no swelling, no effusion, no crepitus, no deformity, no laceration, no spasm and normal pulse.        Right knee: He exhibits normal range of motion, no swelling, no effusion, no ecchymosis, no deformity, no laceration, no erythema, normal alignment, no LCL laxity, normal patellar mobility, no bony tenderness, normal meniscus and no MCL laxity. Tenderness found. Medial joint line and lateral joint line tenderness noted. No MCL, no LCL and no patellar tendon tenderness noted.        Left knee: He exhibits  normal range of motion, no swelling, no effusion, no ecchymosis, no deformity, no laceration, no erythema, normal alignment, no LCL laxity, no bony tenderness, normal meniscus and no MCL laxity. No tenderness found. No medial joint line, no lateral joint line, no MCL, no LCL and no patellar tendon tenderness noted.        Cervical back: He exhibits tenderness and pain. He exhibits normal range of motion, no bony tenderness, no swelling, no edema, no deformity, no laceration, no spasm and normal pulse.        Lumbar back: He exhibits decreased range of motion, tenderness and pain. He exhibits no bony tenderness, no swelling, no edema, no deformity, no laceration, no spasm and normal pulse.        Right foot: There is tenderness and bony tenderness. There is normal range of motion, no swelling, normal capillary refill, no crepitus, no deformity and no laceration.   Lymphadenopathy:        Head (right side): No submental, no submandibular, no preauricular and no posterior auricular adenopathy present.        Head (left side): No submental, no submandibular, no preauricular and no posterior auricular adenopathy present.     He has no cervical adenopathy.   Neurological: He is alert and oriented to person, place, and time. He has normal strength. He displays no atrophy and no tremor. No cranial nerve deficit or sensory deficit. He exhibits normal muscle tone. He displays no seizure activity.   Skin: Skin is warm, dry and intact. No rash noted. He is not diaphoretic. No pallor.   Psychiatric: He has a normal mood and affect. His speech is normal and behavior is normal. Judgment and thought content normal. Cognition and memory are normal. He is attentive.   Vitals reviewed.      Assessment & Plan    Discussion of plan of care including treatment options regarding health and wellness were reviewed and discussed with patient.  Any changes to medication or treatment plan, as well as any screening blood test, imaging, or  referrals to specialist, are documented.  Follow up as indicated.     1. Essential hypertension  Patient was counseled and encouraged to maintain a low sodium diet, as well as increasing physical activity.  Recommend random BP checks at home on a regular basis.  Repeat BP at end of visit was not necessary. Will continue medication at this time, and follow up in 3-6 months, or sooner if blood pressure begins to increase.  Medication dosage decreased; recommended skipping next dose and monitoring BP before restarting due to hypotension.  - amlodipine-olmesartan (JUDY) 5-20 mg per tablet; Take 1 tablet by mouth once daily.  Dispense: 90 tablet; Refill: 1       Follow up in about 3 months (around 11/27/2019).        ACTIVE MEDICAL ISSUES:  Documented in Problem List    PAST MEDICAL HISTORY  Documented    PAST SURGICAL HISTORY:  Documented    SOCIAL HISTORY:  Documented    FAMILY HISTORY:  Documented    ALLERGIES AND MEDICATIONS: updated and reviewed.  Documented    Health Maintenance       Date Due Completion Date    Shingles Vaccine (1 of 2) 09/17/2000 ---    Influenza Vaccine (1) 09/01/2019 1/11/2019    Lipid Panel 02/04/2020 2/4/2019    Colonoscopy 09/13/2021 9/13/2016    TETANUS VACCINE 05/23/2029 5/23/2019

## 2019-08-27 NOTE — TELEPHONE ENCOUNTER
----- Message from Martinez Gaitan sent at 8/27/2019 11:37 AM CDT -----  Contact: Wife- Terri  Type: Patient Call Back    Who called:Wife    What is the request in detail: His blood pressure yesterday was 120/71 at 6:45pm 108/67 at 10:15pm    Can the clinic reply by [facility]?No    Would the patient rather a call back or a response via [facility]? Callback    Best call back number:[phone]

## 2019-08-30 NOTE — PROGRESS NOTES
Patient, Bryan Queen (MRN #[id number]), presented with a recorded BMI of 39.74 kg/m^2 and a documented comorbidity(s):  - Hypertension  to which the severe obesity is a contributing factor. This is consistent with the definition of severe obesity (BMI 35.0-39.9) with comorbidity (ICD-10 E66.01, Z68.35). The patient's severe obesity was monitored, evaluated, addressed and/or treated. This addendum to the medical record is made on 08/30/2019.

## 2019-08-30 NOTE — PROGRESS NOTES
Patient, Bryan Queen (MRN #[id number]), presented with a recent Estimated Glumerular Filtration Rate (EGFR) between 30 and 45 consistent with the definition of chronic kidney disease stage 3 - moderate (ICD10 - N18.3).    eGFR if    Date Value Ref Range Status   08/08/2019 44 (A) >60 mL/min/1.73 m^2 Final       The patient's chronic kidney disease stage 3 was monitored, evaluated, addressed and/or treated. This addendum to the medical record is made on 08/30/2019.

## 2019-09-09 ENCOUNTER — PATIENT MESSAGE (OUTPATIENT)
Dept: FAMILY MEDICINE | Facility: CLINIC | Age: 69
End: 2019-09-09

## 2019-09-10 ENCOUNTER — CLINICAL SUPPORT (OUTPATIENT)
Dept: FAMILY MEDICINE | Facility: CLINIC | Age: 69
End: 2019-09-10
Payer: MEDICARE

## 2019-09-10 ENCOUNTER — PATIENT MESSAGE (OUTPATIENT)
Dept: FAMILY MEDICINE | Facility: CLINIC | Age: 69
End: 2019-09-10

## 2019-09-10 VITALS — SYSTOLIC BLOOD PRESSURE: 128 MMHG | DIASTOLIC BLOOD PRESSURE: 72 MMHG

## 2019-09-10 DIAGNOSIS — I10 ESSENTIAL HYPERTENSION: Primary | ICD-10-CM

## 2019-09-10 PROCEDURE — 99999 PR PBB SHADOW E&M-EST. PATIENT-LVL I: ICD-10-PCS | Mod: PBBFAC,,,

## 2019-09-10 PROCEDURE — 99999 PR PBB SHADOW E&M-EST. PATIENT-LVL I: CPT | Mod: PBBFAC,,,

## 2019-09-10 PROCEDURE — 99499 NO LOS: ICD-10-PCS | Mod: S$GLB,,, | Performed by: FAMILY MEDICINE

## 2019-09-10 PROCEDURE — 99499 UNLISTED E&M SERVICE: CPT | Mod: S$GLB,,, | Performed by: FAMILY MEDICINE

## 2019-09-10 NOTE — PROGRESS NOTES
.  Bryan Queen 68 y.o. male is here today for Blood Pressure check.   History of HTN yes.    Review of patient's allergies indicates:   Allergen Reactions    Hydroxyzine Nausea And Vomiting and Other (See Comments)     SWEATING    Lyrica [pregabalin] Rash    Mobic [meloxicam] Nausea And Vomiting     Tinnitus    Tramadol Nausea And Vomiting     Nauseous,Sweating, Ringing in the ears    Vicodin  [hydrocodone-acetaminophen]      Other reaction(s): Rash     Creatinine   Date Value Ref Range Status   08/08/2019 1.8 (H) 0.5 - 1.4 mg/dL Final     Sodium   Date Value Ref Range Status   08/08/2019 136 136 - 145 mmol/L Final     Potassium   Date Value Ref Range Status   08/08/2019 4.3 3.5 - 5.1 mmol/L Final   ]  Patient verifies taking blood pressure medications on a regular basis at the same time of the day.     Current Outpatient Medications:     amlodipine-olmesartan (JUDY) 5-20 mg per tablet, Take 1 tablet by mouth once daily., Disp: 90 tablet, Rfl: 1    clotrimazole (LOTRIMIN) 1 % cream, Apply to affected area 2 times daily, Disp: 15 g, Rfl: 2    docusate sodium (COLACE) 100 MG capsule, Take 1 capsule (100 mg total) by mouth 2 (two) times daily as needed for Constipation., Disp: 60 capsule, Rfl: 1    DULoxetine (CYMBALTA) 60 MG capsule, Take 1 capsule (60 mg total) by mouth once daily., Disp: 90 capsule, Rfl: 1    fluocinonide (LIDEX) 0.05 % ointment, , Disp: , Rfl:     gabapentin (NEURONTIN) 600 MG tablet, Take 1 tablet (600 mg total) by mouth 3 (three) times daily. 1 Tablet Oral Three times a day, Disp: 90 tablet, Rfl: 5    levocetirizine (XYZAL) 5 MG tablet, Take 1 tablet (5 mg total) by mouth every evening., Disp: 30 tablet, Rfl: 1    meclizine (ANTIVERT) 12.5 mg tablet, Take 1 tablet (12.5 mg total) by mouth 3 (three) times daily as needed for Dizziness., Disp: 20 tablet, Rfl: 0    meloxicam (MOBIC) 15 MG tablet, Take 1 tablet (15 mg total) by mouth once daily., Disp: 30 tablet, Rfl: 5    OMEGA  "3,6,9 COMBINATION NO.7 (OMEGA DHA ORAL), Take 1 capsule by mouth 2 (two) times daily., Disp: , Rfl:     oxybutynin (DITROPAN-XL) 5 MG TR24, Take 1 tablet (5 mg total) by mouth once daily., Disp: 30 tablet, Rfl: 11    sildenafil, antihypertensive, (REVATIO) 20 mg Tab, Take 1 tablet (20 mg total) by mouth as needed (Take 2 to 5 tablets as needed for activity). Disregard."take one tablet", Disp: 90 tablet, Rfl: 11    sulfamethoxazole-trimethoprim 800-160mg (BACTRIM DS) 800-160 mg Tab, Take 1 tablet by mouth 2 (two) times daily., Disp: , Rfl: 0  Does patient have record of home blood pressure readings no. Readings have been averaging N/A.   Last dose of blood pressure medication was taken at around 7 am this morning.  Patient is asymptomatic.   Complains of no symptoms at this time.    Vitals:    09/10/19 1500   BP: 128/72   BP Location: Right arm   Patient Position: Sitting   BP Method: Large (Manual)         Dr. Lombard informed of nurse visit.   "

## 2019-09-25 ENCOUNTER — TELEPHONE (OUTPATIENT)
Dept: PODIATRY | Facility: CLINIC | Age: 69
End: 2019-09-25

## 2019-09-30 ENCOUNTER — PATIENT OUTREACH (OUTPATIENT)
Dept: ADMINISTRATIVE | Facility: OTHER | Age: 69
End: 2019-09-30

## 2019-10-08 ENCOUNTER — PATIENT OUTREACH (OUTPATIENT)
Dept: ADMINISTRATIVE | Facility: OTHER | Age: 69
End: 2019-10-08

## 2019-10-10 ENCOUNTER — OFFICE VISIT (OUTPATIENT)
Dept: PODIATRY | Facility: CLINIC | Age: 69
End: 2019-10-10
Payer: MEDICARE

## 2019-10-10 VITALS
SYSTOLIC BLOOD PRESSURE: 117 MMHG | HEART RATE: 88 BPM | BODY MASS INDEX: 38.34 KG/M2 | DIASTOLIC BLOOD PRESSURE: 67 MMHG | WEIGHT: 253 LBS | HEIGHT: 68 IN

## 2019-10-10 DIAGNOSIS — M19.079 ANKLE ARTHRITIS: Primary | ICD-10-CM

## 2019-10-10 PROCEDURE — 99999 PR PBB SHADOW E&M-EST. PATIENT-LVL IV: ICD-10-PCS | Mod: PBBFAC,,, | Performed by: PODIATRIST

## 2019-10-10 PROCEDURE — 3078F PR MOST RECENT DIASTOLIC BLOOD PRESSURE < 80 MM HG: ICD-10-PCS | Mod: CPTII,S$GLB,, | Performed by: PODIATRIST

## 2019-10-10 PROCEDURE — 3074F PR MOST RECENT SYSTOLIC BLOOD PRESSURE < 130 MM HG: ICD-10-PCS | Mod: CPTII,S$GLB,, | Performed by: PODIATRIST

## 2019-10-10 PROCEDURE — 99203 PR OFFICE/OUTPT VISIT, NEW, LEVL III, 30-44 MIN: ICD-10-PCS | Mod: S$GLB,,, | Performed by: PODIATRIST

## 2019-10-10 PROCEDURE — 99203 OFFICE O/P NEW LOW 30 MIN: CPT | Mod: S$GLB,,, | Performed by: PODIATRIST

## 2019-10-10 PROCEDURE — 3074F SYST BP LT 130 MM HG: CPT | Mod: CPTII,S$GLB,, | Performed by: PODIATRIST

## 2019-10-10 PROCEDURE — 3078F DIAST BP <80 MM HG: CPT | Mod: CPTII,S$GLB,, | Performed by: PODIATRIST

## 2019-10-10 PROCEDURE — 1101F PT FALLS ASSESS-DOCD LE1/YR: CPT | Mod: CPTII,S$GLB,, | Performed by: PODIATRIST

## 2019-10-10 PROCEDURE — 99999 PR PBB SHADOW E&M-EST. PATIENT-LVL IV: CPT | Mod: PBBFAC,,, | Performed by: PODIATRIST

## 2019-10-10 PROCEDURE — 1101F PR PT FALLS ASSESS DOC 0-1 FALLS W/OUT INJ PAST YR: ICD-10-PCS | Mod: CPTII,S$GLB,, | Performed by: PODIATRIST

## 2019-10-10 RX ORDER — METHYLPREDNISOLONE 4 MG/1
TABLET ORAL
Qty: 1 PACKAGE | Refills: 0 | Status: SHIPPED | OUTPATIENT
Start: 2019-10-10 | End: 2019-10-31

## 2019-10-16 NOTE — PROGRESS NOTES
Subjective:      Patient ID: Bryan Queen is a 69 y.o. male.    Chief Complaint: Foot Pain and Follow-up    Bryan is a 69 y.o. male who presents to the podiatry clinic  with complaint of  right foot and ankle pain. Onset of the symptoms was several months ago. Precipitating event: none known. Current symptoms include: ability to bear weight, but with some pain. Aggravating factors: any weight bearing, standing and walking. Symptoms have gradually worsened. Patient has had prior foot problems. Evaluation to date: MRI: abnormal some degenerative changes noted to midfoot joints. Treatment to date: none. Patients rates pain 6/10 on pain scale.        Review of Systems   Constitution: Negative for chills, fever and malaise/fatigue.   HENT: Negative for hearing loss.    Cardiovascular: Negative for claudication.   Respiratory: Negative for shortness of breath.    Skin: Negative for flushing and rash.   Musculoskeletal: Positive for arthritis and joint pain. Negative for myalgias.   Neurological: Negative for loss of balance, numbness, paresthesias and sensory change.   Psychiatric/Behavioral: Negative for altered mental status.           Objective:      Physical Exam   Cardiovascular:   Pulses:       Dorsalis pedis pulses are 2+ on the right side, and 2+ on the left side.        Posterior tibial pulses are 2+ on the right side, and 2+ on the left side.   No edema noted to b/L LEs   Musculoskeletal:   POP to right midfoot and with inversion and eversion.        Feet:   Right Foot:   Protective Sensation: 5 sites tested. 5 sites sensed.   Left Foot:   Protective Sensation: 5 sites tested. 5 sites sensed.   Neurological:   Intact gross sensation noted to b/L LEs   Skin:   Normal skin tugor noted.   No open lesion noted b/L  Skin temp is warm to warm from proximal to distal b/L.  Webspaces clean, dry, and intact  Nails x10 short             Assessment:       Encounter Diagnosis   Name Primary?    Ankle arthritis - Right  Foot Yes         Plan:       Bryan was seen today for foot pain and follow-up.    Diagnoses and all orders for this visit:    Ankle arthritis - Right Foot    Other orders  -     methylPREDNISolone (MEDROL DOSEPACK) 4 mg tablet; use as directed      I counseled the patient on his conditions, their implications and medical management.    MRI discussed with pt, degenerative joints (arthritis likely cause of pain_  Rx medrol dose pack  Ankle brace dispensed to be worn in shoes  Call or return to clinic prn if these symptoms worsen or fail to improve as anticipated.      .

## 2019-11-15 DIAGNOSIS — M17.0 PRIMARY OSTEOARTHRITIS OF BOTH KNEES: ICD-10-CM

## 2019-11-15 DIAGNOSIS — M54.17 LUMBOSACRAL NEURITIS: ICD-10-CM

## 2019-11-15 DIAGNOSIS — M47.26 OSTEOARTHRITIS OF SPINE WITH RADICULOPATHY, LUMBAR REGION: ICD-10-CM

## 2019-11-16 RX ORDER — MELOXICAM 15 MG/1
TABLET ORAL
Qty: 30 TABLET | Refills: 2 | Status: SHIPPED | OUTPATIENT
Start: 2019-11-16 | End: 2020-01-07 | Stop reason: SDUPTHER

## 2019-11-18 ENCOUNTER — PATIENT MESSAGE (OUTPATIENT)
Dept: PODIATRY | Facility: CLINIC | Age: 69
End: 2019-11-18

## 2019-11-19 ENCOUNTER — PATIENT OUTREACH (OUTPATIENT)
Dept: ADMINISTRATIVE | Facility: OTHER | Age: 69
End: 2019-11-19

## 2019-11-22 ENCOUNTER — TELEPHONE (OUTPATIENT)
Dept: PODIATRY | Facility: CLINIC | Age: 69
End: 2019-11-22

## 2019-12-09 DIAGNOSIS — M54.12 CERVICAL MYELOPATHY WITH CERVICAL RADICULOPATHY: ICD-10-CM

## 2019-12-09 DIAGNOSIS — M47.26 OSTEOARTHRITIS OF SPINE WITH RADICULOPATHY, LUMBAR REGION: ICD-10-CM

## 2019-12-09 DIAGNOSIS — M54.17 LUMBOSACRAL NEURITIS: ICD-10-CM

## 2019-12-09 DIAGNOSIS — G95.9 CERVICAL MYELOPATHY WITH CERVICAL RADICULOPATHY: ICD-10-CM

## 2019-12-09 RX ORDER — GABAPENTIN 600 MG/1
600 TABLET ORAL 3 TIMES DAILY
Qty: 270 TABLET | Refills: 1 | Status: SHIPPED | OUTPATIENT
Start: 2019-12-09 | End: 2020-01-07 | Stop reason: SDUPTHER

## 2019-12-18 DIAGNOSIS — M54.17 LUMBOSACRAL NEURITIS: ICD-10-CM

## 2019-12-18 RX ORDER — DULOXETIN HYDROCHLORIDE 60 MG/1
CAPSULE, DELAYED RELEASE ORAL
Qty: 90 CAPSULE | Refills: 0 | Status: SHIPPED | OUTPATIENT
Start: 2019-12-18 | End: 2020-01-07 | Stop reason: SDUPTHER

## 2019-12-26 ENCOUNTER — PATIENT OUTREACH (OUTPATIENT)
Dept: ADMINISTRATIVE | Facility: OTHER | Age: 69
End: 2019-12-26

## 2020-01-02 DIAGNOSIS — M54.17 LUMBOSACRAL NEURITIS: ICD-10-CM

## 2020-01-02 DIAGNOSIS — M17.0 PRIMARY OSTEOARTHRITIS OF BOTH KNEES: ICD-10-CM

## 2020-01-02 DIAGNOSIS — M47.26 OSTEOARTHRITIS OF SPINE WITH RADICULOPATHY, LUMBAR REGION: ICD-10-CM

## 2020-01-02 RX ORDER — MELOXICAM 15 MG/1
15 TABLET ORAL DAILY
Qty: 30 TABLET | Refills: 2 | Status: CANCELLED | OUTPATIENT
Start: 2020-01-02

## 2020-01-02 NOTE — TELEPHONE ENCOUNTER
Patient would like a 90 day supply.  Last Office Visit Info:   The patient's last visit with Azikiwe K Lombard, MD was on 8/27/2019.    The patient's last visit in current department was on 9/10/2019.        Last CBC Results:   Lab Results   Component Value Date    WBC 9.68 08/08/2019    HGB 12.4 (L) 08/08/2019    HCT 38.5 (L) 08/08/2019     08/08/2019       Last CMP Results  Lab Results   Component Value Date     08/08/2019    K 4.3 08/08/2019     08/08/2019    CO2 27 08/08/2019    BUN 19 08/08/2019    CREATININE 1.8 (H) 08/08/2019    CALCIUM 9.3 08/08/2019    ALBUMIN 3.9 08/08/2019    AST 12 08/08/2019    ALT 15 08/08/2019       Last Lipids  Lab Results   Component Value Date    CHOL 133 02/04/2019    TRIG 75 02/04/2019    HDL 37 (L) 02/04/2019    LDLCALC 81.0 02/04/2019       Last A1C  Lab Results   Component Value Date    HGBA1C 5.4 12/21/2016       Last TSH  Lab Results   Component Value Date    TSH 1.029 11/07/2011         Current Med Refills  Medication List with Changes/Refills   Current Medications    AMLODIPINE-OLMESARTAN (JUDY) 5-20 MG PER TABLET    Take 1 tablet by mouth once daily.       Start Date: 8/27/2019 End Date: 9/26/2019    CLOTRIMAZOLE (LOTRIMIN) 1 % CREAM    Apply to affected area 2 times daily       Start Date: 1/29/2018 End Date: --    DOCUSATE SODIUM (COLACE) 100 MG CAPSULE    Take 1 capsule (100 mg total) by mouth 2 (two) times daily as needed for Constipation.       Start Date: 4/13/2015 End Date: --    DULOXETINE (CYMBALTA) 60 MG CAPSULE    TAKE 1 CAPSULE BY MOUTH EVERY DAY       Start Date: 12/18/2019End Date: --    FLUOCINONIDE (LIDEX) 0.05 % OINTMENT           Start Date: 3/30/2018 End Date: --    GABAPENTIN (NEURONTIN) 600 MG TABLET    TAKE 1 TABLET (600 MG TOTAL) BY MOUTH 3 (THREE) TIMES DAILY. 1 TABLET ORAL THREE TIMES A DAY       Start Date: 12/9/2019 End Date: --    LEVOCETIRIZINE (XYZAL) 5 MG TABLET    Take 1 tablet (5 mg total) by mouth every evening.        "Start Date: 7/16/2018 End Date: --    MECLIZINE (ANTIVERT) 12.5 MG TABLET    Take 1 tablet (12.5 mg total) by mouth 3 (three) times daily as needed for Dizziness.       Start Date: 8/8/2019  End Date: --    MELOXICAM (MOBIC) 15 MG TABLET    TAKE 1 TABLET BY MOUTH EVERY DAY       Start Date: 11/16/2019End Date: --    OMEGA 3,6,9 COMBINATION NO.7 (OMEGA DHA ORAL)    Take 1 capsule by mouth 2 (two) times daily.       Start Date: --        End Date: --    OXYBUTYNIN (DITROPAN-XL) 5 MG TR24    Take 1 tablet (5 mg total) by mouth once daily.       Start Date: 12/29/2017End Date: 12/29/2018    SILDENAFIL, ANTIHYPERTENSIVE, (REVATIO) 20 MG TAB    Take 1 tablet (20 mg total) by mouth as needed (Take 2 to 5 tablets as needed for activity). Disregard."take one tablet"       Start Date: 2/22/2018 End Date: 2/22/2019    SULFAMETHOXAZOLE-TRIMETHOPRIM 800-160MG (BACTRIM DS) 800-160 MG TAB    Take 1 tablet by mouth 2 (two) times daily.       Start Date: 8/19/2019 End Date: --       Order(s) placed per written order guidelines:     Please advise.              "

## 2020-01-07 DIAGNOSIS — M17.0 PRIMARY OSTEOARTHRITIS OF BOTH KNEES: ICD-10-CM

## 2020-01-07 DIAGNOSIS — I10 ESSENTIAL HYPERTENSION: ICD-10-CM

## 2020-01-07 DIAGNOSIS — M54.12 CERVICAL MYELOPATHY WITH CERVICAL RADICULOPATHY: ICD-10-CM

## 2020-01-07 DIAGNOSIS — M47.26 OSTEOARTHRITIS OF SPINE WITH RADICULOPATHY, LUMBAR REGION: ICD-10-CM

## 2020-01-07 DIAGNOSIS — G95.9 CERVICAL MYELOPATHY WITH CERVICAL RADICULOPATHY: ICD-10-CM

## 2020-01-07 DIAGNOSIS — M54.17 LUMBOSACRAL NEURITIS: ICD-10-CM

## 2020-01-09 ENCOUNTER — TELEPHONE (OUTPATIENT)
Dept: FAMILY MEDICINE | Facility: CLINIC | Age: 70
End: 2020-01-09

## 2020-01-09 NOTE — TELEPHONE ENCOUNTER
----- Message from Logan Soto sent at 1/9/2020  2:11 PM CST -----  Contact: Kiki @ [facility]  Name of Who is Calling: [facility]      What is the request in detail: Kiki @ [facility] is requesting a call back regarding a Rx request for the patient. Please contact to further advise.      Can the clinic reply by [facility]: NO      [facility] [phone]

## 2020-01-12 RX ORDER — DULOXETIN HYDROCHLORIDE 60 MG/1
60 CAPSULE, DELAYED RELEASE ORAL DAILY
Qty: 90 CAPSULE | Refills: 1 | Status: SHIPPED | OUTPATIENT
Start: 2020-01-12 | End: 2020-01-15 | Stop reason: SDUPTHER

## 2020-01-12 RX ORDER — GABAPENTIN 600 MG/1
600 TABLET ORAL 3 TIMES DAILY
Qty: 270 TABLET | Refills: 1 | Status: SHIPPED | OUTPATIENT
Start: 2020-01-12 | End: 2020-01-15 | Stop reason: SDUPTHER

## 2020-01-12 RX ORDER — MELOXICAM 15 MG/1
15 TABLET ORAL DAILY
Qty: 90 TABLET | Refills: 1 | Status: SHIPPED | OUTPATIENT
Start: 2020-01-12 | End: 2020-01-15 | Stop reason: SDUPTHER

## 2020-01-12 RX ORDER — AMLODIPINE AND OLMESARTAN MEDOXOMIL 5; 20 MG/1; MG/1
1 TABLET ORAL DAILY
Qty: 90 TABLET | Refills: 1 | Status: SHIPPED | OUTPATIENT
Start: 2020-01-12 | End: 2020-01-15 | Stop reason: SDUPTHER

## 2020-01-15 DIAGNOSIS — I10 ESSENTIAL HYPERTENSION: ICD-10-CM

## 2020-01-15 DIAGNOSIS — M54.12 CERVICAL MYELOPATHY WITH CERVICAL RADICULOPATHY: ICD-10-CM

## 2020-01-15 DIAGNOSIS — M17.0 PRIMARY OSTEOARTHRITIS OF BOTH KNEES: ICD-10-CM

## 2020-01-15 DIAGNOSIS — M47.26 OSTEOARTHRITIS OF SPINE WITH RADICULOPATHY, LUMBAR REGION: ICD-10-CM

## 2020-01-15 DIAGNOSIS — G95.9 CERVICAL MYELOPATHY WITH CERVICAL RADICULOPATHY: ICD-10-CM

## 2020-01-15 DIAGNOSIS — M54.17 LUMBOSACRAL NEURITIS: ICD-10-CM

## 2020-01-15 RX ORDER — MELOXICAM 15 MG/1
15 TABLET ORAL DAILY
Qty: 90 TABLET | Refills: 1 | Status: SHIPPED | OUTPATIENT
Start: 2020-01-15 | End: 2020-04-02 | Stop reason: SDUPTHER

## 2020-01-15 RX ORDER — DULOXETIN HYDROCHLORIDE 60 MG/1
60 CAPSULE, DELAYED RELEASE ORAL DAILY
Qty: 90 CAPSULE | Refills: 1 | Status: SHIPPED | OUTPATIENT
Start: 2020-01-15 | End: 2020-04-02 | Stop reason: SDUPTHER

## 2020-01-15 RX ORDER — AMLODIPINE AND OLMESARTAN MEDOXOMIL 5; 20 MG/1; MG/1
1 TABLET ORAL DAILY
Qty: 90 TABLET | Refills: 1 | Status: SHIPPED | OUTPATIENT
Start: 2020-01-15 | End: 2020-01-16

## 2020-01-15 RX ORDER — GABAPENTIN 600 MG/1
600 TABLET ORAL 3 TIMES DAILY
Qty: 270 TABLET | Refills: 1 | Status: SHIPPED | OUTPATIENT
Start: 2020-01-15 | End: 2020-04-02 | Stop reason: SDUPTHER

## 2020-01-15 NOTE — TELEPHONE ENCOUNTER
----- Message from Valery Palma sent at 1/15/2020 12:54 PM CST -----  Contact: Divina  Type: Patient Call Back    Who called:divina    What is the request in detail:[facility] in [place] is calling to get all pt meds transferred there. I saw they are in route from [facility] but pt does not want [facility]s going [facility].    Can the clinic reply by [facility]?no    Would the patient rather a call back or a response via [facility]? call    Best call back number:

## 2020-01-16 RX ORDER — OLMESARTAN MEDOXOMIL 20 MG/1
20 TABLET ORAL DAILY
Qty: 90 TABLET | Refills: 3 | Status: SHIPPED | OUTPATIENT
Start: 2020-01-16 | End: 2020-04-02 | Stop reason: SDUPTHER

## 2020-01-16 RX ORDER — AMLODIPINE BESYLATE 5 MG/1
5 TABLET ORAL DAILY
Qty: 90 TABLET | Refills: 3 | Status: SHIPPED | OUTPATIENT
Start: 2020-01-16 | End: 2020-04-02 | Stop reason: SDUPTHER

## 2020-01-16 NOTE — TELEPHONE ENCOUNTER
----- Message from Martinez Gaitan sent at 1/16/2020  2:35 PM CST -----  Contact: [facility]  Type:  Pharmacy Calling to Clarify an RX    Name of Caller:    Ph[facility] Name: [facility]    Prescription Name: amlodipine-olmesartan (JUDY) 5-20 mg per tablet    What do they need to clarify? Insurance will not cover combination, they two separate prescriptions sent.    Can you be contacted via [facility]?Call    Best Call Back Number: [phone]

## 2020-01-21 NOTE — TELEPHONE ENCOUNTER
Pharmacy ( [facility] ) is asking if you can send over separate rx for Amlodipine 5Mg and Olmesartan 20mg, that is the only way the [employer] company will pay is for 2 rx. Thanks.

## 2020-01-24 ENCOUNTER — PATIENT OUTREACH (OUTPATIENT)
Dept: ADMINISTRATIVE | Facility: OTHER | Age: 70
End: 2020-01-24

## 2020-01-27 ENCOUNTER — OFFICE VISIT (OUTPATIENT)
Dept: PODIATRY | Facility: CLINIC | Age: 70
End: 2020-01-27
Payer: MEDICARE

## 2020-01-27 ENCOUNTER — LAB VISIT (OUTPATIENT)
Dept: LAB | Facility: HOSPITAL | Age: 70
End: 2020-01-27
Attending: PODIATRIST
Payer: MEDICARE

## 2020-01-27 VITALS — BODY MASS INDEX: 38.35 KG/M2 | WEIGHT: 253.06 LBS | HEIGHT: 68 IN

## 2020-01-27 DIAGNOSIS — B35.3 TINEA PEDIS, UNSPECIFIED LATERALITY: ICD-10-CM

## 2020-01-27 DIAGNOSIS — M19.079 ANKLE ARTHRITIS: ICD-10-CM

## 2020-01-27 DIAGNOSIS — M19.079 ANKLE ARTHRITIS: Primary | ICD-10-CM

## 2020-01-27 DIAGNOSIS — B35.1 ONYCHOMYCOSIS DUE TO DERMATOPHYTE: ICD-10-CM

## 2020-01-27 LAB — URATE SERPL-MCNC: 4.5 MG/DL (ref 3.4–7)

## 2020-01-27 PROCEDURE — 1125F PR PAIN SEVERITY QUANTIFIED, PAIN PRESENT: ICD-10-PCS | Mod: HCNC,S$GLB,, | Performed by: PODIATRIST

## 2020-01-27 PROCEDURE — 1101F PR PT FALLS ASSESS DOC 0-1 FALLS W/OUT INJ PAST YR: ICD-10-PCS | Mod: HCNC,CPTII,S$GLB, | Performed by: PODIATRIST

## 2020-01-27 PROCEDURE — 99214 OFFICE O/P EST MOD 30 MIN: CPT | Mod: HCNC,S$GLB,, | Performed by: PODIATRIST

## 2020-01-27 PROCEDURE — 1159F MED LIST DOCD IN RCRD: CPT | Mod: HCNC,S$GLB,, | Performed by: PODIATRIST

## 2020-01-27 PROCEDURE — 84550 ASSAY OF BLOOD/URIC ACID: CPT | Mod: HCNC

## 2020-01-27 PROCEDURE — 1159F PR MEDICATION LIST DOCUMENTED IN MEDICAL RECORD: ICD-10-PCS | Mod: HCNC,S$GLB,, | Performed by: PODIATRIST

## 2020-01-27 PROCEDURE — 36415 COLL VENOUS BLD VENIPUNCTURE: CPT | Mod: HCNC,PO

## 2020-01-27 PROCEDURE — 99999 PR PBB SHADOW E&M-EST. PATIENT-LVL III: ICD-10-PCS | Mod: PBBFAC,HCNC,, | Performed by: PODIATRIST

## 2020-01-27 PROCEDURE — 1101F PT FALLS ASSESS-DOCD LE1/YR: CPT | Mod: HCNC,CPTII,S$GLB, | Performed by: PODIATRIST

## 2020-01-27 PROCEDURE — 1125F AMNT PAIN NOTED PAIN PRSNT: CPT | Mod: HCNC,S$GLB,, | Performed by: PODIATRIST

## 2020-01-27 PROCEDURE — 99999 PR PBB SHADOW E&M-EST. PATIENT-LVL III: CPT | Mod: PBBFAC,HCNC,, | Performed by: PODIATRIST

## 2020-01-27 PROCEDURE — 99214 PR OFFICE/OUTPT VISIT, EST, LEVL IV, 30-39 MIN: ICD-10-PCS | Mod: HCNC,S$GLB,, | Performed by: PODIATRIST

## 2020-01-27 RX ORDER — KETOCONAZOLE 20 MG/G
CREAM TOPICAL DAILY
Qty: 1 TUBE | Refills: 3 | Status: SHIPPED | OUTPATIENT
Start: 2020-01-27 | End: 2021-05-18

## 2020-01-27 NOTE — PROGRESS NOTES
Subjective:      Patient ID: Bryan Queen is a 69 y.o. male.    Chief Complaint: Nail Care (8/27/19 Dr Lombard PCP); Foot Pain (right top foot); and Heel Pain (right foot)    Bryan is a 69 y.o. male who presents to the podiatry clinic  with complaint of  right foot and ankle pain. Onset of the symptoms was several months ago. Precipitating event: none known. Current symptoms include: ability to bear weight, but with some pain. Aggravating factors: any weight bearing, standing and walking. Symptoms have gradually worsened. Patient has had prior foot problems. Evaluation to date: MRI: abnormal some degenerative changes noted to midfoot joints. Treatment to date: none. Patients rates pain 6/10 on pain scale.    1/27/20: F/u right ankle pain. Reports continued pain worse after getting up    CC#2: Reports elongated nails difficult to trim.     Review of Systems   Constitution: Negative for chills, fever and malaise/fatigue.   HENT: Negative for hearing loss.    Cardiovascular: Negative for claudication.   Respiratory: Negative for shortness of breath.    Skin: Negative for flushing and rash.   Musculoskeletal: Positive for arthritis and joint pain. Negative for myalgias.   Neurological: Negative for loss of balance, numbness, paresthesias and sensory change.   Psychiatric/Behavioral: Negative for altered mental status.           Objective:      Physical Exam   Cardiovascular:   Pulses:       Dorsalis pedis pulses are 2+ on the right side, and 2+ on the left side.        Posterior tibial pulses are 2+ on the right side, and 2+ on the left side.   No edema noted to b/L LEs   Musculoskeletal:   POP to right midfoot and with inversion and eversion.     Scaling dryness in a moccasin distribution is noted to the bilateral lower extremities            Feet:   Right Foot:   Protective Sensation: 5 sites tested. 5 sites sensed.   Left Foot:   Protective Sensation: 5 sites tested. 5 sites sensed.   Neurological:   Intact gross  sensation noted to b/L LEs   Skin:   Normal skin tugor noted.   No open lesion noted b/L  Skin temp is warm to warm from proximal to distal b/L.  Webspaces clean, dry, and intact    Toenails 1-5 bilaterally are elongated by 2-3 mm, thickened by 2-3 mm, discolored/yellowed, dystrophic, brittle with subungual debris.               Assessment:       Encounter Diagnoses   Name Primary?    Ankle arthritis - Right Foot Yes    Onychomycosis due to dermatophyte     Tinea pedis, unspecified laterality          Plan:       Bryan was seen today for nail care, foot pain and heel pain.    Diagnoses and all orders for this visit:    Ankle arthritis - Right Foot  -     Ambulatory Referral to Physical/Occupational Therapy  -     Uric acid; Future    Onychomycosis due to dermatophyte    Tinea pedis, unspecified laterality    Other orders  -     ketoconazole (NIZORAL) 2 % cream; Apply topically once daily.      I counseled the patient on his conditions, their implications and medical management.    MRI discussed with pt, degenerative joints (arthritis likely cause of pain_  Referral for PT placed  Continued with ankle brace.     Uric acid level ordered to r/o gout.     Ketoconazole 2% topical cream prescribed for treatment of aforementioned tinea pedis. Patient will use this medication as directed in addition to thourougly drying between toes daily, and applying powder as needed       Nails 1-5 B/L feet trimmed. Patient is aware that routine trimming of nails or calluses will not be covered service per insurance and he will fall under Proc B if this service is desired. Patient verbalized understanding of this. RTC as needed for Proc B or any other pedal complaint.     F/u 6 weeks right ankle pain. Proc B PRN nail care.     .

## 2020-02-13 ENCOUNTER — CLINICAL SUPPORT (OUTPATIENT)
Dept: REHABILITATION | Facility: HOSPITAL | Age: 70
End: 2020-02-13
Attending: PODIATRIST
Payer: MEDICARE

## 2020-02-13 DIAGNOSIS — M25.571 CHRONIC PAIN OF RIGHT ANKLE: ICD-10-CM

## 2020-02-13 DIAGNOSIS — M25.671 STIFFNESS OF ANKLE JOINT, RIGHT: ICD-10-CM

## 2020-02-13 DIAGNOSIS — R26.2 DIFFICULTY WALKING: ICD-10-CM

## 2020-02-13 DIAGNOSIS — G89.29 CHRONIC PAIN OF RIGHT ANKLE: ICD-10-CM

## 2020-02-13 PROCEDURE — 97161 PT EVAL LOW COMPLEX 20 MIN: CPT | Mod: HCNC,PN

## 2020-02-13 PROCEDURE — 97110 THERAPEUTIC EXERCISES: CPT | Mod: HCNC,PN

## 2020-02-13 NOTE — PLAN OF CARE
[facility]  Physical Therapy Initial Evaluation    Name: Bryan Queen  Clinic Number: [id number]    Therapy Diagnosis:   Encounter Diagnoses   Name Primary?    Difficulty walking     Stiffness of ankle joint, right     Chronic pain of right ankle      Physician: Kate Godoy DPM    Physician Orders: PT Eval and Treat     Medical Diagnosis from Referral:   M19.079 (ICD-10-CM) - Ankle arthritis  Evaluation Date: 2/13/2020  Plan of Care Expiration: 5/13/20    Authorization Period Expiration: 3/12/20  Visit # / Visits authorized: 1/ 1      Time In: 0900  Time Out: 1000    Total Billable Time: 60 minutes    Precautions: standard.     Subjective   Date of onset: one year    History of current condition:   Bryan  is a 69 year old male presenting with c/o right foot/ ankle pain. He reports an insidious onset a year ago.  He does states he broke his right ankle 25 years ago states he has hardware in the ankle.  He presents ambulating with a SPC. He states he has been using a cane occasionally for several years.  He states he is only able to walk a block with a cane and a 1/4 block without.  He states x-rays reveal OA.  He does report a history of falls and difficulties with his balance.      Medical History:   Past Medical History:   Diagnosis Date    Arthritis     BPH (benign prostatic hypertrophy)     Cancer     prostate    Colon polyps     Elevated PSA     Groin abscess     Hypertension     Joint pain     Lumbar radiculopathy     Obstructive sleep apnea (adult) (pediatric)     CPAP hs    PONV (postoperative nausea and vomiting)     Prostate cancer     Spinal stenosis        Surgical History:   Bryan Queen  has a past surgical history that includes Cervical fusion (1/27/2009); Lumbar discectomy (12/3/2009); Tonsillectomy; Spine surgery (04/01/13); Colonoscopy (N/A, 9/13/2016); Knee surgery (Left, 4-13-15); Knee surgery (Right, 8-19-15); and Prostatectomy  (08/16/2017).    Medications:   Bryan has a current medication list which includes the following prescription(s): amlodipine, clotrimazole, docusate sodium, duloxetine, fluocinonide, gabapentin, ketoconazole, levocetirizine, meclizine, meloxicam, olmesartan, omega 3,6,9 combination no.7, oxybutynin, sildenafil, and sulfamethoxazole-trimethoprim 800-160mg.    Allergies:   Review of patient's allergies indicates:   Allergen Reactions    Hydroxyzine Nausea And Vomiting and Other (See Comments)     SWEATING    Lyrica [pregabalin] Rash    Mobic [meloxicam] Nausea And Vomiting     Tinnitus    Tramadol Nausea And Vomiting     Nauseous,Sweating, Ringing in the ears    Vicodin  [hydrocodone-acetaminophen]      Other reaction(s): Rash        Imaging: recent MRI reveals: Examination limited by metallic artifact.  Severe degenerative changes identified tibiotalar joint with joint space narrowing, subchondral edema/sclerosis marginal osteophytosis and small joint effusion.  Soft tissue edema predominantly subcutaneous.    Prior Therapy:  Bilateral TKA, spine surgery  Social History:  lives with their family, two story home (hasn't been upstairs in months)  Occupation: Retired p[employer]  Prior Level of Function: modified independence  Current Level of Function: modified independence    Pain:  Current 8/10, worst 10/10, best 4/10   Location: right foot/ankle       Aggravating Factors: standing, walking, initial standing,   Easing Factors: rest    Pts goals: His goal is to decrease pain.  He would also like to be able to walk with less pain and difficulty.       Objective     Observation: pt ambulates with an antalgic gait, decreased stance on involved right LE with SPC. Right ASO brace.   Mild swelling globally.   Palpation: mild tenderness on palpation of distal achilles tendon and dorsal foot.     Range of Motion/Strength:      Ankle  Right  LEFT     AROM MMT AROM MMT   Dorsiflexion 0 4- 5 4   Plantar Flexion 25 4- 36 4    Inversion 20 4- 39 4   Eversion 5 4- 12 4       AROM: Bilateral LE: Grossly WFL  MMT:  Right LE: 4-   Left LE: 4      Bed Mobility:Independent  Transfers: Independent  Mod assist with donning ASO brace and footwear     CMS Impairment/Limitation/Restriction for FOTO Ankle Survey  Status Limitation G-Code CMS Severity Modifier  Intake 32% 68% Current Status CL - At least 60 percent but less than 80 percent  Predicted 48% 52% Goal Status+ CK - At least 40 percent but less than 60 percent    TREATMENT     Treatment Time In: 0930  Treatment Time Out: 1000    Total Treatment time separate from Evaluation: 30 minutes    Bryan received therapeutic exercises to develop strength, endurance, ROM, flexibility, posture and core stabilization for 10 minutes including:   -gastroc stretch vs table 30 sec  4  -alphabet x 20  -towel stretch 30 sec x 4    Bryan received the following manual therapy techniques:  were applied to the: right foot/ankle for 5 minutes, including:  -PROM, MTP mobilization    Education provided:   - HEP compliance    Written Home Exercises Provided: yes.    Exercises were reviewed and Bryan was able to demonstrate them prior to the end of the session.  Bryan demonstrated good  understanding of the education provided.     See EMR under Patient Instructions for exercises provided 2/13/2020.    Assessment     Pt presents with signs and symptoms consistent with referring diagnosis. Evaluation has determined a decrease in functional status and subjective and objective deficits that can be addressed by physical therapy intervention. Pt demonstrates pain limiting functional activities. Decreased flexibility and strength limiting normal movement patterns. Decreased segmental motion. Decreased postural strength and awareness. Positive special testing. Decreased participation in functional and recreational activities. Subjective and objective measures are addressed by goals in the plan of care.  Patient/family  are involved in the development of these goals. Patient/family are educated about current injury and treatment.       Plan of care was dicussed with patient. Pt will benefit from skilled outpatient Physical Therapy to address the deficits stated above and in the chart below, provide pt/family education, and to maximize pt's level of independence. Pt's spiritual, cultural and educational needs considered and patient is agreeable to the plan of care and goals as stated below:     Pt prognosis is Good.  Anticipated Barriers for therapy: none    Medical Necessity is demonstrated by the following  History  Co-morbidities and personal factors that may impact the plan of care Co-morbidities:   Arthritis   BPH (benign prostatic hypertrophy)   Cancer   prostate   Colon polyps   Elevated PSA   Groin abscess   Hypertension   Joint pain   Lumbar radiculopathy   Obstructive sleep apnea (adult) (pediatric)   CPAP hs   PONV (postoperative nausea and vomiting)   Prostate cancer   Spinal stenosis       Personal Factors:   no deficits     low   Examination  Body Structures and Functions, activity limitations and participation restrictions that may impact the plan of care Body Regions:   lower extremities    Body Systems:    gross symmetry  ROM  strength  balance  gait  transitions    Participation Restrictions:   Working around home, ADL's, shopping    Activity limitations:   Learning and applying knowledge  no deficits    General Tasks and Commands  no deficits    Communication  no deficits    Mobility  lifting and carrying objects  walking    Self care  dressing    Domestic Life  shopping  cooking  doing house work (cleaning house, washing dishes, laundry)    Interactions/Relationships  no deficits    Life Areas  no deficits    Community and Social Life  community life         low   Clinical Presentation stable and uncomplicated low   Decision Making/ Complexity Score: low     Goals:    Short Term Goals (4 Weeks):     1.Pt to  increase strength by a 1/2 grade of muscles test to allow for improvement in functional activities such as performing chores.  2.Pt to improve range of motion by 25% to allow for improved functional mobility to allow for improvement in IADLs.   3.Pt to report compliance with HEP and demonstrate proper exercise technique to PT to show competence with self management of condition.  4.Decrease pain by 25% during functional activities.    Long Term Goals (12 Weeks):     1. Increase ROM to allow improved joint biomechanics during functional activities.   2.Increase trunk and lower extremity strength to within normal limits during functional activities.   3. Independent with home exercise program.   4. Full return to functional activities with manageable complaints.  5. Patient to demonstrate improved posture and body mechanics.  6. Decrease pain by 75% during functional activities.    Plan     Plan of care Certification: 2/13/2020 to 5/13/20.    Recommended Treatment Plan: 2 times per week for 12 weeks with treatments to consist of:  Neuromuscular and postural re-education,  training, therapeutic exercise, therapeutic activities,balance training, gait training, manual therapy, soft tissue mobilization, ROM exercises, Cardiovascular,  Postural stabilization, manual traction, spinal mobilization, moist heat, cryotherapy, electrical stimulation, ultrasound, home exercise education and planning.    Jian Jaimes, PT

## 2020-03-11 ENCOUNTER — PATIENT OUTREACH (OUTPATIENT)
Dept: ADMINISTRATIVE | Facility: OTHER | Age: 70
End: 2020-03-11

## 2020-03-20 ENCOUNTER — DOCUMENTATION ONLY (OUTPATIENT)
Dept: REHABILITATION | Facility: HOSPITAL | Age: 70
End: 2020-03-20

## 2020-03-20 NOTE — PROGRESS NOTES
Patient: Bryan Queen  Date: 3/20/2020  Diagnosis: No diagnosis found.  MRN: [id number]    Spoke with patient due to therapy following updates regarding COVID-19 closely and taking every precaution to ensure the safety of our patients, staff and community.  In an abundance of caution and in an effort to help reduce risk and limit community spread, we have decided to temporarily postpone appointments for patients who may be at increased risk to attend in-person therapy or where therapy is not critically needed at this time. Plan of care and home exercise program were reviewed and patient has what they need to continue therapy at home. All patient questions were answered. Also stated to patient that we are exploring virtual methods of providing care and will be in touch over the next few weeks. Patient verbalized understanding to all.    Luke Jaimes PT    3/20/2020

## 2020-04-02 DIAGNOSIS — M54.17 LUMBOSACRAL NEURITIS: ICD-10-CM

## 2020-04-02 DIAGNOSIS — M17.0 PRIMARY OSTEOARTHRITIS OF BOTH KNEES: ICD-10-CM

## 2020-04-02 DIAGNOSIS — M47.26 OSTEOARTHRITIS OF SPINE WITH RADICULOPATHY, LUMBAR REGION: ICD-10-CM

## 2020-04-02 DIAGNOSIS — G95.9 CERVICAL MYELOPATHY WITH CERVICAL RADICULOPATHY: ICD-10-CM

## 2020-04-02 DIAGNOSIS — M54.12 CERVICAL MYELOPATHY WITH CERVICAL RADICULOPATHY: ICD-10-CM

## 2020-04-02 NOTE — TELEPHONE ENCOUNTER
Last Office Visit Info:   The patient's last visit with Azikiwe K Lombard, MD was on 8/27/2019.    The patient's last visit in current department was on Visit date not found.        Last CBC Results:   Lab Results   Component Value Date    WBC 9.68 08/08/2019    HGB 12.4 (L) 08/08/2019    HCT 38.5 (L) 08/08/2019     08/08/2019       Last CMP Results  Lab Results   Component Value Date     08/08/2019    K 4.3 08/08/2019     08/08/2019    CO2 27 08/08/2019    BUN 19 08/08/2019    CREATININE 1.8 (H) 08/08/2019    CALCIUM 9.3 08/08/2019    ALBUMIN 3.9 08/08/2019    AST 12 08/08/2019    ALT 15 08/08/2019       Last Lipids  Lab Results   Component Value Date    CHOL 133 02/04/2019    TRIG 75 02/04/2019    HDL 37 (L) 02/04/2019    LDLCALC 81.0 02/04/2019       Last A1C  Lab Results   Component Value Date    HGBA1C 5.4 12/21/2016       Last TSH  Lab Results   Component Value Date    TSH 1.029 11/07/2011         Current Med Refills  Medication List with Changes/Refills   Current Medications    AMLODIPINE (NORVASC) 5 MG TABLET    Take 1 tablet (5 mg total) by mouth once daily.       Start Date: 1/16/2020 End Date: 1/15/2021    CLOTRIMAZOLE (LOTRIMIN) 1 % CREAM    Apply to affected area 2 times daily       Start Date: 1/29/2018 End Date: --    DOCUSATE SODIUM (COLACE) 100 MG CAPSULE    Take 1 capsule (100 mg total) by mouth 2 (two) times daily as needed for Constipation.       Start Date: 4/13/2015 End Date: --    DULOXETINE (CYMBALTA) 60 MG CAPSULE    Take 1 capsule (60 mg total) by mouth once daily.       Start Date: 1/15/2020 End Date: --    FLUOCINONIDE (LIDEX) 0.05 % OINTMENT           Start Date: 3/30/2018 End Date: --    GABAPENTIN (NEURONTIN) 600 MG TABLET    Take 1 tablet (600 mg total) by mouth 3 (three) times daily. 1 Tablet Oral Three times a day       Start Date: 1/15/2020 End Date: --    KETOCONAZOLE (NIZORAL) 2 % CREAM    Apply topically once daily.       Start Date: 1/27/2020 End Date: --     "LEVOCETIRIZINE (XYZAL) 5 MG TABLET    Take 1 tablet (5 mg total) by mouth every evening.       Start Date: 7/16/2018 End Date: --    MECLIZINE (ANTIVERT) 12.5 MG TABLET    Take 1 tablet (12.5 mg total) by mouth 3 (three) times daily as needed for Dizziness.       Start Date: 8/8/2019  End Date: --    MELOXICAM (MOBIC) 15 MG TABLET    Take 1 tablet (15 mg total) by mouth once daily.       Start Date: 1/15/2020 End Date: --    OLMESARTAN (BENICAR) 20 MG TABLET    Take 1 tablet (20 mg total) by mouth once daily.       Start Date: 1/16/2020 End Date: 1/15/2021    OMEGA 3,6,9 COMBINATION NO.7 (OMEGA DHA ORAL)    Take 1 capsule by mouth 2 (two) times daily.       Start Date: --        End Date: --    OXYBUTYNIN (DITROPAN-XL) 5 MG TR24    Take 1 tablet (5 mg total) by mouth once daily.       Start Date: 12/29/2017End Date: 12/29/2018    SILDENAFIL, ANTIHYPERTENSIVE, (REVATIO) 20 MG TAB    Take 1 tablet (20 mg total) by mouth as needed (Take 2 to 5 tablets as needed for activity). Disregard."take one tablet"       Start Date: 2/22/2018 End Date: 2/22/2019    SULFAMETHOXAZOLE-TRIMETHOPRIM 800-160MG (BACTRIM DS) 800-160 MG TAB    Take 1 tablet by mouth 2 (two) times daily.       Start Date: 8/19/2019 End Date: --       Order(s) placed per written order guidelines:     Please advise.              "

## 2020-04-03 RX ORDER — AMLODIPINE BESYLATE 5 MG/1
5 TABLET ORAL DAILY
Qty: 90 TABLET | Refills: 3 | Status: SHIPPED | OUTPATIENT
Start: 2020-04-03 | End: 2021-04-08 | Stop reason: SDUPTHER

## 2020-04-03 RX ORDER — OLMESARTAN MEDOXOMIL 20 MG/1
20 TABLET ORAL DAILY
Qty: 90 TABLET | Refills: 3 | Status: SHIPPED | OUTPATIENT
Start: 2020-04-03 | End: 2021-04-08 | Stop reason: SDUPTHER

## 2020-04-03 RX ORDER — GABAPENTIN 600 MG/1
600 TABLET ORAL 3 TIMES DAILY
Qty: 270 TABLET | Refills: 1 | Status: SHIPPED | OUTPATIENT
Start: 2020-04-03 | End: 2021-04-12

## 2020-04-03 RX ORDER — MELOXICAM 15 MG/1
15 TABLET ORAL DAILY
Qty: 90 TABLET | Refills: 1 | Status: SHIPPED | OUTPATIENT
Start: 2020-04-03 | End: 2020-12-11 | Stop reason: SDUPTHER

## 2020-04-03 RX ORDER — DULOXETIN HYDROCHLORIDE 60 MG/1
60 CAPSULE, DELAYED RELEASE ORAL DAILY
Qty: 90 CAPSULE | Refills: 1 | Status: SHIPPED | OUTPATIENT
Start: 2020-04-03 | End: 2020-12-18

## 2020-09-29 ENCOUNTER — PATIENT MESSAGE (OUTPATIENT)
Dept: OTHER | Facility: OTHER | Age: 70
End: 2020-09-29

## 2020-10-05 ENCOUNTER — PATIENT MESSAGE (OUTPATIENT)
Dept: ADMINISTRATIVE | Facility: HOSPITAL | Age: 70
End: 2020-10-05

## 2020-12-01 DIAGNOSIS — M47.26 OSTEOARTHRITIS OF SPINE WITH RADICULOPATHY, LUMBAR REGION: ICD-10-CM

## 2020-12-01 DIAGNOSIS — M54.17 LUMBOSACRAL NEURITIS: ICD-10-CM

## 2020-12-01 DIAGNOSIS — M17.0 PRIMARY OSTEOARTHRITIS OF BOTH KNEES: ICD-10-CM

## 2020-12-02 RX ORDER — MELOXICAM 15 MG/1
TABLET ORAL
Qty: 90 TABLET | Refills: 1 | OUTPATIENT
Start: 2020-12-02

## 2020-12-09 NOTE — TELEPHONE ENCOUNTER
----- Message from Freya Flores sent at 12/9/2020  1:26 PM CST -----  Contact: Wife Terri [phone]  Type: Patient Call Back    Who called: Wife Terri    What is the request in detail: Need to speak to nurse in regards to medication, meloxicam (MOBIC) 15 MG tablet. States  needs a refill on the medication and it was denied. States that he's had spine surgery and this is the only medication that gives him relief. Need the medication ASAP! Please send to [facility] and call wife in regards to this.   .  [id number] - [place] - [address]  [address]  [place] [address]  Phone: [phone] Fax: [phone]    Would the patient rather a call back or a response via [facility]? Call back    Best call back number: [phone]

## 2020-12-10 NOTE — PROGRESS NOTES
"Subjective:       Patient ID: Bryan Queen is a pleasant 70 y.o. Black or  male patient    Chief Complaint: Follow-up and Hypertension      Patient is a new pt to me but established pt from Dr. Lombard. Last visit in 08/2019.     HPI     He comes today to f-up as not been seen for more than one year.  He has no specific complaint today except R foot and bilateral knees pain that is chronic and "aches and pain" in regard of back.   He would like to see if he can take more meloxicam.  Remains active in senior care, raising chickens among other things.    Patient Active Problem List   Diagnosis    Cervical myelopathy with cervical radiculopathy    Gait disorder    DJD (degenerative joint disease) of knee    Colon polyps    Herpes    Anemia    DDD (degenerative disc disease), lumbar    Osteoarthritis of spine with radiculopathy, lumbar region    Lumbosacral neuritis    Primary osteoarthritis of both knees    Status post total left knee replacement 4/13/2015    Status post total right knee replacement 8/19/2015    Essential hypertension    Morbid obesity due to excess calories    History of prostate cancer    Difficulty walking    Stiffness of ankle joint, right    Chronic pain of right ankle          ACTIVE MEDICAL ISSUES:  Documented in Problem List     PAST MEDICAL HISTORY  Documented     PAST SURGICAL HISTORY:  Documented     SOCIAL HISTORY:  Documented     FAMILY HISTORY:  Documented     ALLERGIES AND MEDICATIONS: updated and reviewed.  Documented    Review of Systems   Constitutional: Negative.    Respiratory: Negative.    Cardiovascular: Negative.    Gastrointestinal: Negative.    Musculoskeletal: Positive for arthralgias and back pain.   All other systems reviewed and are negative.      Objective:      Physical Exam  Vitals signs reviewed.   Constitutional:       General: He is not in acute distress.     Appearance: Normal appearance. He is well-developed. He is not " ill-appearing, toxic-appearing or diaphoretic.   HENT:      Head: Normocephalic and atraumatic.      Right Ear: Hearing, tympanic membrane, ear canal and external ear normal. No decreased hearing noted. No tenderness. No foreign body. No mastoid tenderness. No hemotympanum. Tympanic membrane is not injected, scarred, perforated, erythematous, retracted or bulging.      Left Ear: Hearing, tympanic membrane, ear canal and external ear normal. No decreased hearing noted. No tenderness. No foreign body. No mastoid tenderness. No hemotympanum. Tympanic membrane is not injected, scarred, perforated, erythematous, retracted or bulging.      Nose: Nose normal. No nasal deformity or septal deviation.      Mouth/Throat:      Dentition: Normal dentition. Does not have dentures. No dental caries.      Pharynx: Uvula midline. No oropharyngeal exudate, posterior oropharyngeal erythema or uvula swelling.      Tonsils: No tonsillar abscesses.   Eyes:      General: Lids are normal. No scleral icterus.        Right eye: No foreign body, discharge or hordeolum.         Left eye: No foreign body, discharge or hordeolum.      Conjunctiva/sclera: Conjunctivae normal.      Right eye: No chemosis or exudate.     Left eye: No chemosis or exudate.     Pupils: Pupils are equal, round, and reactive to light.   Neck:      Musculoskeletal: Full passive range of motion without pain, normal range of motion and neck supple.      Thyroid: No thyroid mass or thyromegaly.   Cardiovascular:      Rate and Rhythm: Normal rate and regular rhythm.      Heart sounds: Normal heart sounds, S1 normal and S2 normal. No murmur. No friction rub. No gallop.    Pulmonary:      Effort: Pulmonary effort is normal.      Breath sounds: Normal breath sounds. No decreased breath sounds, wheezing, rhonchi or rales.   Abdominal:      General: Bowel sounds are normal. There is no distension.      Palpations: Abdomen is soft. Abdomen is not rigid. There is no mass.       Tenderness: There is no abdominal tenderness. There is no guarding or rebound.      Hernia: No hernia is present.   Musculoskeletal:      Right shoulder: He exhibits tenderness, pain and decreased strength. He exhibits normal range of motion, no bony tenderness, no swelling, no effusion, no crepitus, no deformity, no laceration, no spasm and normal pulse.      Right knee: He exhibits normal range of motion, no swelling, no effusion, no ecchymosis, no deformity, no laceration, no erythema, normal alignment, no LCL laxity, normal patellar mobility, no bony tenderness, normal meniscus and no MCL laxity. Tenderness found. Medial joint line and lateral joint line tenderness noted. No MCL, no LCL and no patellar tendon tenderness noted.      Left knee: He exhibits normal range of motion, no swelling, no effusion, no ecchymosis, no deformity, no laceration, no erythema, normal alignment, no LCL laxity, no bony tenderness, normal meniscus and no MCL laxity. No tenderness found. No medial joint line, no lateral joint line, no MCL, no LCL and no patellar tendon tenderness noted.      Cervical back: He exhibits tenderness and pain. He exhibits normal range of motion, no bony tenderness, no swelling, no edema, no deformity, no laceration, no spasm and normal pulse.      Lumbar back: He exhibits decreased range of motion, tenderness and pain. He exhibits no bony tenderness, no swelling, no edema, no deformity, no laceration, no spasm and normal pulse.      Right foot: Normal range of motion and normal capillary refill. Tenderness and bony tenderness present. No swelling, crepitus, deformity or laceration.   Lymphadenopathy:      Head:      Right side of head: No submental, submandibular, preauricular or posterior auricular adenopathy.      Left side of head: No submental, submandibular, preauricular or posterior auricular adenopathy.      Cervical: No cervical adenopathy.   Skin:     General: Skin is warm and dry.       "Coloration: Skin is not pale.      Findings: No rash.   Neurological:      Mental Status: He is alert and oriented to person, place, and time.      Cranial Nerves: No cranial nerve deficit.      Sensory: No sensory deficit.      Motor: No tremor, atrophy, abnormal muscle tone or seizure activity.   Psychiatric:         Attention and Perception: He is attentive.         Speech: Speech normal.         Behavior: Behavior normal. Behavior is cooperative.         Thought Content: Thought content normal.         Judgment: Judgment normal.         Vitals:    12/11/20 1135   BP: 124/80   BP Location: Left arm   Patient Position: Sitting   BP Method: Large (Manual)   Pulse: 91   Resp: 16   Temp: 98.1 °F (36.7 °C)   TempSrc: Oral   SpO2: 96%   Weight: 117.9 kg (259 lb 14.8 oz)   Height: 5' 8.4" (1.737 m)     Body mass index is 39.06 kg/m².    RESULTS: Reviewed labs from last 12 months    Last Lab Results:     Lab Results   Component Value Date    WBC 5.33 12/11/2020    HGB 13.0 (L) 12/11/2020    HCT 42.1 12/11/2020     12/11/2020     12/11/2020    K 4.2 12/11/2020     12/11/2020    CO2 24 12/11/2020    BUN 18 12/11/2020    CREATININE 1.3 12/11/2020    CALCIUM 9.2 12/11/2020    ALBUMIN 3.9 12/11/2020    AST 18 12/11/2020    ALT 16 12/11/2020    CHOL 161 12/11/2020    TRIG 90 12/11/2020    HDL 36 (L) 12/11/2020    LDLCALC 107.0 12/11/2020    HGBA1C 5.5 12/11/2020    TSH 1.376 12/11/2020    PSA <0.01 02/04/2019       Assessment:       1. Essential hypertension    2. Class 2 severe obesity due to excess calories with serious comorbidity and body mass index (BMI) of 39.0 to 39.9 in adult    3. Lumbosacral neuritis    4. Osteoarthritis of spine with radiculopathy, lumbar region    5. Primary osteoarthritis of both knees    6. Skin lesion    7. Need for influenza vaccination    8. Encounter for lipid screening for cardiovascular disease    9. Elevated blood sugar        Plan:   Bryan was seen today for regular " f-up.    Diagnoses and all orders for this visit:    Essential hypertension  -     CBC Auto Differential; Future  -     Comprehensive Metabolic Panel; Future  -     TSH; Future    BP at goal, blood work fine. Same treatment.    Class 2 severe obesity due to excess calories with serious comorbidity and body mass index (BMI) of 39.0 to 39.9 in adult    With HTN. Working on lifestyle, remains active.    Lumbosacral neuritis  -     meloxicam (MOBIC) 15 MG tablet; Take 1 tablet (15 mg total) by mouth once daily.    Advised that meloxicam is max dosage. He can use APAP up to TID in the interval.    Osteoarthritis of spine with radiculopathy, lumbar region  -     meloxicam (MOBIC) 15 MG tablet; Take 1 tablet (15 mg total) by mouth once daily.    See above.    Primary osteoarthritis of both knees  -     meloxicam (MOBIC) 15 MG tablet; Take 1 tablet (15 mg total) by mouth once daily.    See above.    Skin lesion  -     fluocinonide (LIDEX) 0.05 % ointment; Apply topically daily as needed.    Occasional, above coccyx, calm.    Need for influenza vaccination  -     Influenza - Quadrivalent (Adjuvanted)    Administered today.    Encounter for lipid screening for cardiovascular disease  -     Lipid Panel; Future    Checked today, fine except low HDL.    Elevated blood sugar  -     Hemoglobin A1C; Future    A1C at goal.      Follow up in about 3 months (around 3/11/2021) for f-up HTN and blood work with Dr.Lombard.    This note was created by combination of typed  and M-Modal dictation.  Transcription errors may be present.  If there are any questions, please contact me.

## 2020-12-11 ENCOUNTER — PATIENT MESSAGE (OUTPATIENT)
Dept: OTHER | Facility: OTHER | Age: 70
End: 2020-12-11

## 2020-12-11 ENCOUNTER — LAB VISIT (OUTPATIENT)
Dept: LAB | Facility: HOSPITAL | Age: 70
End: 2020-12-11
Payer: MEDICARE

## 2020-12-11 ENCOUNTER — OFFICE VISIT (OUTPATIENT)
Dept: FAMILY MEDICINE | Facility: CLINIC | Age: 70
End: 2020-12-11
Payer: MEDICARE

## 2020-12-11 VITALS
SYSTOLIC BLOOD PRESSURE: 124 MMHG | BODY MASS INDEX: 39.4 KG/M2 | HEIGHT: 68 IN | RESPIRATION RATE: 16 BRPM | WEIGHT: 259.94 LBS | OXYGEN SATURATION: 96 % | HEART RATE: 91 BPM | TEMPERATURE: 98 F | DIASTOLIC BLOOD PRESSURE: 80 MMHG

## 2020-12-11 DIAGNOSIS — I10 ESSENTIAL HYPERTENSION: ICD-10-CM

## 2020-12-11 DIAGNOSIS — Z23 NEED FOR INFLUENZA VACCINATION: ICD-10-CM

## 2020-12-11 DIAGNOSIS — M47.26 OSTEOARTHRITIS OF SPINE WITH RADICULOPATHY, LUMBAR REGION: ICD-10-CM

## 2020-12-11 DIAGNOSIS — L98.9 SKIN LESION: ICD-10-CM

## 2020-12-11 DIAGNOSIS — Z13.6 ENCOUNTER FOR LIPID SCREENING FOR CARDIOVASCULAR DISEASE: ICD-10-CM

## 2020-12-11 DIAGNOSIS — M17.0 PRIMARY OSTEOARTHRITIS OF BOTH KNEES: ICD-10-CM

## 2020-12-11 DIAGNOSIS — Z13.220 ENCOUNTER FOR LIPID SCREENING FOR CARDIOVASCULAR DISEASE: ICD-10-CM

## 2020-12-11 DIAGNOSIS — R73.9 ELEVATED BLOOD SUGAR: ICD-10-CM

## 2020-12-11 DIAGNOSIS — M54.17 LUMBOSACRAL NEURITIS: ICD-10-CM

## 2020-12-11 DIAGNOSIS — E66.01 CLASS 2 SEVERE OBESITY DUE TO EXCESS CALORIES WITH SERIOUS COMORBIDITY AND BODY MASS INDEX (BMI) OF 39.0 TO 39.9 IN ADULT: ICD-10-CM

## 2020-12-11 LAB
ALBUMIN SERPL BCP-MCNC: 3.9 G/DL (ref 3.5–5.2)
ALP SERPL-CCNC: 74 U/L (ref 55–135)
ALT SERPL W/O P-5'-P-CCNC: 16 U/L (ref 10–44)
ANION GAP SERPL CALC-SCNC: 10 MMOL/L (ref 8–16)
AST SERPL-CCNC: 18 U/L (ref 10–40)
BASOPHILS # BLD AUTO: 0.1 K/UL (ref 0–0.2)
BASOPHILS NFR BLD: 1.9 % (ref 0–1.9)
BILIRUB SERPL-MCNC: 0.6 MG/DL (ref 0.1–1)
BUN SERPL-MCNC: 18 MG/DL (ref 8–23)
CALCIUM SERPL-MCNC: 9.2 MG/DL (ref 8.7–10.5)
CHLORIDE SERPL-SCNC: 108 MMOL/L (ref 95–110)
CHOLEST SERPL-MCNC: 161 MG/DL (ref 120–199)
CHOLEST/HDLC SERPL: 4.5 {RATIO} (ref 2–5)
CO2 SERPL-SCNC: 24 MMOL/L (ref 23–29)
CREAT SERPL-MCNC: 1.3 MG/DL (ref 0.5–1.4)
DIFFERENTIAL METHOD: ABNORMAL
EOSINOPHIL # BLD AUTO: 0.2 K/UL (ref 0–0.5)
EOSINOPHIL NFR BLD: 3.9 % (ref 0–8)
ERYTHROCYTE [DISTWIDTH] IN BLOOD BY AUTOMATED COUNT: 13.3 % (ref 11.5–14.5)
EST. GFR  (AFRICAN AMERICAN): >60 ML/MIN/1.73 M^2
EST. GFR  (NON AFRICAN AMERICAN): 55.3 ML/MIN/1.73 M^2
ESTIMATED AVG GLUCOSE: 111 MG/DL (ref 68–131)
GLUCOSE SERPL-MCNC: 100 MG/DL (ref 70–110)
HBA1C MFR BLD HPLC: 5.5 % (ref 4–5.6)
HCT VFR BLD AUTO: 42.1 % (ref 40–54)
HDLC SERPL-MCNC: 36 MG/DL (ref 40–75)
HDLC SERPL: 22.4 % (ref 20–50)
HGB BLD-MCNC: 13 G/DL (ref 14–18)
IMM GRANULOCYTES # BLD AUTO: 0.01 K/UL (ref 0–0.04)
IMM GRANULOCYTES NFR BLD AUTO: 0.2 % (ref 0–0.5)
LDLC SERPL CALC-MCNC: 107 MG/DL (ref 63–159)
LYMPHOCYTES # BLD AUTO: 2.3 K/UL (ref 1–4.8)
LYMPHOCYTES NFR BLD: 42.6 % (ref 18–48)
MCH RBC QN AUTO: 29 PG (ref 27–31)
MCHC RBC AUTO-ENTMCNC: 30.9 G/DL (ref 32–36)
MCV RBC AUTO: 94 FL (ref 82–98)
MONOCYTES # BLD AUTO: 0.5 K/UL (ref 0.3–1)
MONOCYTES NFR BLD: 9.9 % (ref 4–15)
NEUTROPHILS # BLD AUTO: 2.2 K/UL (ref 1.8–7.7)
NEUTROPHILS NFR BLD: 41.5 % (ref 38–73)
NONHDLC SERPL-MCNC: 125 MG/DL
NRBC BLD-RTO: 0 /100 WBC
PLATELET # BLD AUTO: 334 K/UL (ref 150–350)
PMV BLD AUTO: 10.1 FL (ref 9.2–12.9)
POTASSIUM SERPL-SCNC: 4.2 MMOL/L (ref 3.5–5.1)
PROT SERPL-MCNC: 7.3 G/DL (ref 6–8.4)
RBC # BLD AUTO: 4.49 M/UL (ref 4.6–6.2)
SODIUM SERPL-SCNC: 142 MMOL/L (ref 136–145)
TRIGL SERPL-MCNC: 90 MG/DL (ref 30–150)
TSH SERPL DL<=0.005 MIU/L-ACNC: 1.38 UIU/ML (ref 0.4–4)
WBC # BLD AUTO: 5.33 K/UL (ref 3.9–12.7)

## 2020-12-11 PROCEDURE — 3008F PR BODY MASS INDEX (BMI) DOCUMENTED: ICD-10-PCS | Mod: HCNC,CPTII,S$GLB, | Performed by: INTERNAL MEDICINE

## 2020-12-11 PROCEDURE — 3079F PR MOST RECENT DIASTOLIC BLOOD PRESSURE 80-89 MM HG: ICD-10-PCS | Mod: HCNC,CPTII,S$GLB, | Performed by: INTERNAL MEDICINE

## 2020-12-11 PROCEDURE — 1101F PR PT FALLS ASSESS DOC 0-1 FALLS W/OUT INJ PAST YR: ICD-10-PCS | Mod: HCNC,CPTII,S$GLB, | Performed by: INTERNAL MEDICINE

## 2020-12-11 PROCEDURE — 1159F MED LIST DOCD IN RCRD: CPT | Mod: HCNC,S$GLB,, | Performed by: INTERNAL MEDICINE

## 2020-12-11 PROCEDURE — G0008 ADMIN INFLUENZA VIRUS VAC: HCPCS | Mod: HCNC,S$GLB,, | Performed by: INTERNAL MEDICINE

## 2020-12-11 PROCEDURE — 99214 OFFICE O/P EST MOD 30 MIN: CPT | Mod: HCNC,25,S$GLB, | Performed by: INTERNAL MEDICINE

## 2020-12-11 PROCEDURE — 1125F PR PAIN SEVERITY QUANTIFIED, PAIN PRESENT: ICD-10-PCS | Mod: HCNC,S$GLB,, | Performed by: INTERNAL MEDICINE

## 2020-12-11 PROCEDURE — 1101F PT FALLS ASSESS-DOCD LE1/YR: CPT | Mod: HCNC,CPTII,S$GLB, | Performed by: INTERNAL MEDICINE

## 2020-12-11 PROCEDURE — G0008 FLU VACCINE - QUADRIVALENT - ADJUVANTED: ICD-10-PCS | Mod: HCNC,S$GLB,, | Performed by: INTERNAL MEDICINE

## 2020-12-11 PROCEDURE — 99214 PR OFFICE/OUTPT VISIT, EST, LEVL IV, 30-39 MIN: ICD-10-PCS | Mod: HCNC,25,S$GLB, | Performed by: INTERNAL MEDICINE

## 2020-12-11 PROCEDURE — 3288F PR FALLS RISK ASSESSMENT DOCUMENTED: ICD-10-PCS | Mod: HCNC,CPTII,S$GLB, | Performed by: INTERNAL MEDICINE

## 2020-12-11 PROCEDURE — 90694 VACC AIIV4 NO PRSRV 0.5ML IM: CPT | Mod: HCNC,S$GLB,, | Performed by: INTERNAL MEDICINE

## 2020-12-11 PROCEDURE — 90694 FLU VACCINE - QUADRIVALENT - ADJUVANTED: ICD-10-PCS | Mod: HCNC,S$GLB,, | Performed by: INTERNAL MEDICINE

## 2020-12-11 PROCEDURE — 99999 PR PBB SHADOW E&M-EST. PATIENT-LVL III: CPT | Mod: PBBFAC,HCNC,, | Performed by: INTERNAL MEDICINE

## 2020-12-11 PROCEDURE — 99499 UNLISTED E&M SERVICE: CPT | Mod: S$GLB,,, | Performed by: INTERNAL MEDICINE

## 2020-12-11 PROCEDURE — 80053 COMPREHEN METABOLIC PANEL: CPT | Mod: HCNC

## 2020-12-11 PROCEDURE — 83036 HEMOGLOBIN GLYCOSYLATED A1C: CPT | Mod: HCNC

## 2020-12-11 PROCEDURE — 3008F BODY MASS INDEX DOCD: CPT | Mod: HCNC,CPTII,S$GLB, | Performed by: INTERNAL MEDICINE

## 2020-12-11 PROCEDURE — 3074F PR MOST RECENT SYSTOLIC BLOOD PRESSURE < 130 MM HG: ICD-10-PCS | Mod: HCNC,CPTII,S$GLB, | Performed by: INTERNAL MEDICINE

## 2020-12-11 PROCEDURE — 80061 LIPID PANEL: CPT | Mod: HCNC

## 2020-12-11 PROCEDURE — 1159F PR MEDICATION LIST DOCUMENTED IN MEDICAL RECORD: ICD-10-PCS | Mod: HCNC,S$GLB,, | Performed by: INTERNAL MEDICINE

## 2020-12-11 PROCEDURE — 3288F FALL RISK ASSESSMENT DOCD: CPT | Mod: HCNC,CPTII,S$GLB, | Performed by: INTERNAL MEDICINE

## 2020-12-11 PROCEDURE — 36415 COLL VENOUS BLD VENIPUNCTURE: CPT | Mod: HCNC,PO

## 2020-12-11 PROCEDURE — 85025 COMPLETE CBC W/AUTO DIFF WBC: CPT | Mod: HCNC

## 2020-12-11 PROCEDURE — 1125F AMNT PAIN NOTED PAIN PRSNT: CPT | Mod: HCNC,S$GLB,, | Performed by: INTERNAL MEDICINE

## 2020-12-11 PROCEDURE — 84443 ASSAY THYROID STIM HORMONE: CPT | Mod: HCNC

## 2020-12-11 PROCEDURE — 99499 RISK ADDL DX/OHS AUDIT: ICD-10-PCS | Mod: S$GLB,,, | Performed by: INTERNAL MEDICINE

## 2020-12-11 PROCEDURE — 3079F DIAST BP 80-89 MM HG: CPT | Mod: HCNC,CPTII,S$GLB, | Performed by: INTERNAL MEDICINE

## 2020-12-11 PROCEDURE — 3074F SYST BP LT 130 MM HG: CPT | Mod: HCNC,CPTII,S$GLB, | Performed by: INTERNAL MEDICINE

## 2020-12-11 PROCEDURE — 99999 PR PBB SHADOW E&M-EST. PATIENT-LVL III: ICD-10-PCS | Mod: PBBFAC,HCNC,, | Performed by: INTERNAL MEDICINE

## 2020-12-11 RX ORDER — FLUOCINONIDE 0.5 MG/G
OINTMENT TOPICAL DAILY PRN
Qty: 30 G | Refills: 2 | Status: SHIPPED | OUTPATIENT
Start: 2020-12-11 | End: 2021-05-18 | Stop reason: SDUPTHER

## 2020-12-11 RX ORDER — MELOXICAM 15 MG/1
15 TABLET ORAL DAILY
Qty: 90 TABLET | Refills: 1 | Status: SHIPPED | OUTPATIENT
Start: 2020-12-11 | End: 2021-05-18 | Stop reason: SDUPTHER

## 2020-12-11 NOTE — PROGRESS NOTES
Health Maintenance Due   Topic Date Due    Shingles Vaccine (1 of 2)     Lipid Panel      Influenza Vaccine (1) Ok to get

## 2020-12-14 ENCOUNTER — TELEPHONE (OUTPATIENT)
Dept: FAMILY MEDICINE | Facility: CLINIC | Age: 70
End: 2020-12-14

## 2020-12-14 NOTE — TELEPHONE ENCOUNTER
----- Message from Thais Aguilar sent at 12/14/2020  9:51 AM CST -----  Regarding: Resend Refill Request  Please refill the medication(s) listed below :Patient wife called states medication was sent to wrong location needing prescription to go to pharmacy listed below.    Medication # 1 :meloxicam (MOBIC) 15 MG tablet    Medication # 2 :fluocinonide (LIDEX) 0.05 % ointment    Please call the patient when the prescription is sent to pharmacy :[phone]    Can the clinic reply in [facility] :No     Preferred Pharmacy : [facility] [id number]  [place] - [address] [phone] (Phone)  [phone] (Fax

## 2020-12-16 DIAGNOSIS — M54.17 LUMBOSACRAL NEURITIS: ICD-10-CM

## 2020-12-18 RX ORDER — DULOXETIN HYDROCHLORIDE 60 MG/1
CAPSULE, DELAYED RELEASE ORAL
Qty: 90 CAPSULE | Refills: 0 | Status: SHIPPED | OUTPATIENT
Start: 2020-12-18 | End: 2021-04-12

## 2021-04-08 DIAGNOSIS — I10 ESSENTIAL HYPERTENSION: Primary | ICD-10-CM

## 2021-04-08 RX ORDER — OLMESARTAN MEDOXOMIL 20 MG/1
TABLET ORAL
Qty: 90 TABLET | Refills: 0 | OUTPATIENT
Start: 2021-04-08

## 2021-04-08 RX ORDER — AMLODIPINE BESYLATE 5 MG/1
5 TABLET ORAL DAILY
Qty: 90 TABLET | Refills: 0 | Status: SHIPPED | OUTPATIENT
Start: 2021-04-08 | End: 2021-04-12

## 2021-04-08 RX ORDER — AMLODIPINE BESYLATE 5 MG/1
5 TABLET ORAL DAILY
Qty: 90 TABLET | Refills: 3 | OUTPATIENT
Start: 2021-04-08 | End: 2022-04-08

## 2021-04-08 RX ORDER — OLMESARTAN MEDOXOMIL 20 MG/1
20 TABLET ORAL DAILY
Qty: 90 TABLET | Refills: 0 | Status: SHIPPED | OUTPATIENT
Start: 2021-04-08 | End: 2021-04-12

## 2021-04-08 RX ORDER — OLMESARTAN MEDOXOMIL 20 MG/1
20 TABLET ORAL DAILY
Qty: 90 TABLET | Refills: 3 | OUTPATIENT
Start: 2021-04-08 | End: 2022-04-08

## 2021-04-08 RX ORDER — AMLODIPINE BESYLATE 5 MG/1
TABLET ORAL
Qty: 90 TABLET | Refills: 0 | OUTPATIENT
Start: 2021-04-08

## 2021-04-09 ENCOUNTER — TELEPHONE (OUTPATIENT)
Dept: FAMILY MEDICINE | Facility: CLINIC | Age: 71
End: 2021-04-09

## 2021-04-09 DIAGNOSIS — M54.12 CERVICAL MYELOPATHY WITH CERVICAL RADICULOPATHY: ICD-10-CM

## 2021-04-09 DIAGNOSIS — M47.26 OSTEOARTHRITIS OF SPINE WITH RADICULOPATHY, LUMBAR REGION: ICD-10-CM

## 2021-04-09 DIAGNOSIS — I10 ESSENTIAL HYPERTENSION: ICD-10-CM

## 2021-04-09 DIAGNOSIS — M54.17 LUMBOSACRAL NEURITIS: ICD-10-CM

## 2021-04-09 DIAGNOSIS — G95.9 CERVICAL MYELOPATHY WITH CERVICAL RADICULOPATHY: ICD-10-CM

## 2021-04-12 RX ORDER — AMLODIPINE BESYLATE 5 MG/1
TABLET ORAL
Qty: 90 TABLET | Refills: 0 | Status: SHIPPED | OUTPATIENT
Start: 2021-04-12 | End: 2021-05-18 | Stop reason: SDUPTHER

## 2021-04-12 RX ORDER — OLMESARTAN MEDOXOMIL 20 MG/1
TABLET ORAL
Qty: 90 TABLET | Refills: 0 | Status: SHIPPED | OUTPATIENT
Start: 2021-04-12 | End: 2021-05-18 | Stop reason: SDUPTHER

## 2021-04-12 RX ORDER — DULOXETIN HYDROCHLORIDE 60 MG/1
CAPSULE, DELAYED RELEASE ORAL
Qty: 90 CAPSULE | Refills: 0 | Status: SHIPPED | OUTPATIENT
Start: 2021-04-12 | End: 2021-05-18 | Stop reason: SDUPTHER

## 2021-04-12 RX ORDER — GABAPENTIN 600 MG/1
TABLET ORAL
Qty: 270 TABLET | Refills: 1 | Status: SHIPPED | OUTPATIENT
Start: 2021-04-12 | End: 2021-05-18 | Stop reason: SDUPTHER

## 2021-04-15 ENCOUNTER — TELEPHONE (OUTPATIENT)
Dept: SLEEP MEDICINE | Facility: CLINIC | Age: 71
End: 2021-04-15

## 2021-05-18 ENCOUNTER — OFFICE VISIT (OUTPATIENT)
Dept: FAMILY MEDICINE | Facility: CLINIC | Age: 71
End: 2021-05-18
Payer: MEDICARE

## 2021-05-18 ENCOUNTER — LAB VISIT (OUTPATIENT)
Dept: LAB | Facility: HOSPITAL | Age: 71
End: 2021-05-18
Attending: FAMILY MEDICINE
Payer: MEDICARE

## 2021-05-18 VITALS
HEART RATE: 91 BPM | TEMPERATURE: 99 F | RESPIRATION RATE: 20 BRPM | SYSTOLIC BLOOD PRESSURE: 156 MMHG | HEIGHT: 68 IN | WEIGHT: 269.19 LBS | DIASTOLIC BLOOD PRESSURE: 90 MMHG | OXYGEN SATURATION: 97 % | BODY MASS INDEX: 40.8 KG/M2

## 2021-05-18 DIAGNOSIS — M47.26 OSTEOARTHRITIS OF SPINE WITH RADICULOPATHY, LUMBAR REGION: ICD-10-CM

## 2021-05-18 DIAGNOSIS — M54.12 CERVICAL MYELOPATHY WITH CERVICAL RADICULOPATHY: ICD-10-CM

## 2021-05-18 DIAGNOSIS — L30.9 DERMATITIS: ICD-10-CM

## 2021-05-18 DIAGNOSIS — Z85.46 HISTORY OF PROSTATE CANCER: ICD-10-CM

## 2021-05-18 DIAGNOSIS — E55.9 VITAMIN D INSUFFICIENCY: ICD-10-CM

## 2021-05-18 DIAGNOSIS — M17.0 PRIMARY OSTEOARTHRITIS OF BOTH KNEES: ICD-10-CM

## 2021-05-18 DIAGNOSIS — Z86.39 PERSONAL HISTORY OF OTHER ENDOCRINE, NUTRITIONAL AND METABOLIC DISEASE: ICD-10-CM

## 2021-05-18 DIAGNOSIS — I10 ESSENTIAL HYPERTENSION: Primary | ICD-10-CM

## 2021-05-18 DIAGNOSIS — E66.01 MORBID OBESITY DUE TO EXCESS CALORIES: ICD-10-CM

## 2021-05-18 DIAGNOSIS — G95.9 CERVICAL MYELOPATHY WITH CERVICAL RADICULOPATHY: ICD-10-CM

## 2021-05-18 DIAGNOSIS — I10 ESSENTIAL HYPERTENSION: ICD-10-CM

## 2021-05-18 LAB
25(OH)D3+25(OH)D2 SERPL-MCNC: 18 NG/ML (ref 30–96)
ALBUMIN SERPL BCP-MCNC: 3.5 G/DL (ref 3.5–5.2)
ALP SERPL-CCNC: 72 U/L (ref 55–135)
ALT SERPL W/O P-5'-P-CCNC: 16 U/L (ref 10–44)
ANION GAP SERPL CALC-SCNC: 7 MMOL/L (ref 8–16)
AST SERPL-CCNC: 16 U/L (ref 10–40)
BASOPHILS # BLD AUTO: 0.1 K/UL (ref 0–0.2)
BASOPHILS NFR BLD: 1.7 % (ref 0–1.9)
BILIRUB SERPL-MCNC: 0.3 MG/DL (ref 0.1–1)
BUN SERPL-MCNC: 16 MG/DL (ref 8–23)
CALCIUM SERPL-MCNC: 8.8 MG/DL (ref 8.7–10.5)
CHLORIDE SERPL-SCNC: 107 MMOL/L (ref 95–110)
CHOLEST SERPL-MCNC: 154 MG/DL (ref 120–199)
CHOLEST/HDLC SERPL: 4.1 {RATIO} (ref 2–5)
CO2 SERPL-SCNC: 25 MMOL/L (ref 23–29)
CREAT SERPL-MCNC: 1.4 MG/DL (ref 0.5–1.4)
DIFFERENTIAL METHOD: ABNORMAL
EOSINOPHIL # BLD AUTO: 0.2 K/UL (ref 0–0.5)
EOSINOPHIL NFR BLD: 3.7 % (ref 0–8)
ERYTHROCYTE [DISTWIDTH] IN BLOOD BY AUTOMATED COUNT: 14 % (ref 11.5–14.5)
EST. GFR  (AFRICAN AMERICAN): 58.4 ML/MIN/1.73 M^2
EST. GFR  (NON AFRICAN AMERICAN): 50.5 ML/MIN/1.73 M^2
ESTIMATED AVG GLUCOSE: 114 MG/DL (ref 68–131)
GLUCOSE SERPL-MCNC: 118 MG/DL (ref 70–110)
HBA1C MFR BLD: 5.6 % (ref 4–5.6)
HCT VFR BLD AUTO: 42.6 % (ref 40–54)
HDLC SERPL-MCNC: 38 MG/DL (ref 40–75)
HDLC SERPL: 24.7 % (ref 20–50)
HGB BLD-MCNC: 13.1 G/DL (ref 14–18)
IMM GRANULOCYTES # BLD AUTO: 0.02 K/UL (ref 0–0.04)
IMM GRANULOCYTES NFR BLD AUTO: 0.3 % (ref 0–0.5)
LDLC SERPL CALC-MCNC: 97.6 MG/DL (ref 63–159)
LYMPHOCYTES # BLD AUTO: 2.5 K/UL (ref 1–4.8)
LYMPHOCYTES NFR BLD: 41.5 % (ref 18–48)
MCH RBC QN AUTO: 29.8 PG (ref 27–31)
MCHC RBC AUTO-ENTMCNC: 30.8 G/DL (ref 32–36)
MCV RBC AUTO: 97 FL (ref 82–98)
MONOCYTES # BLD AUTO: 0.5 K/UL (ref 0.3–1)
MONOCYTES NFR BLD: 8.8 % (ref 4–15)
NEUTROPHILS # BLD AUTO: 2.6 K/UL (ref 1.8–7.7)
NEUTROPHILS NFR BLD: 44 % (ref 38–73)
NONHDLC SERPL-MCNC: 116 MG/DL
NRBC BLD-RTO: 0 /100 WBC
PLATELET # BLD AUTO: 332 K/UL (ref 150–450)
PMV BLD AUTO: 9.8 FL (ref 9.2–12.9)
POTASSIUM SERPL-SCNC: 4.5 MMOL/L (ref 3.5–5.1)
PROSTATE SPECIFIC ANTIGEN, TOTAL: <0.01 NG/ML (ref 0–4)
PROT SERPL-MCNC: 7 G/DL (ref 6–8.4)
PSA FREE MFR SERPL: NORMAL %
PSA FREE SERPL-MCNC: <0.01 NG/ML (ref 0.01–1.5)
RBC # BLD AUTO: 4.39 M/UL (ref 4.6–6.2)
SODIUM SERPL-SCNC: 139 MMOL/L (ref 136–145)
T4 FREE SERPL-MCNC: 0.85 NG/DL (ref 0.71–1.51)
TRIGL SERPL-MCNC: 92 MG/DL (ref 30–150)
TSH SERPL DL<=0.005 MIU/L-ACNC: 2.85 UIU/ML (ref 0.4–4)
WBC # BLD AUTO: 5.9 K/UL (ref 3.9–12.7)

## 2021-05-18 PROCEDURE — 3077F SYST BP >= 140 MM HG: CPT | Mod: CPTII,S$GLB,, | Performed by: FAMILY MEDICINE

## 2021-05-18 PROCEDURE — 82306 VITAMIN D 25 HYDROXY: CPT | Performed by: FAMILY MEDICINE

## 2021-05-18 PROCEDURE — 3288F FALL RISK ASSESSMENT DOCD: CPT | Mod: CPTII,S$GLB,, | Performed by: FAMILY MEDICINE

## 2021-05-18 PROCEDURE — 3008F PR BODY MASS INDEX (BMI) DOCUMENTED: ICD-10-PCS | Mod: CPTII,S$GLB,, | Performed by: FAMILY MEDICINE

## 2021-05-18 PROCEDURE — 84439 ASSAY OF FREE THYROXINE: CPT | Performed by: FAMILY MEDICINE

## 2021-05-18 PROCEDURE — 84154 ASSAY OF PSA FREE: CPT | Performed by: FAMILY MEDICINE

## 2021-05-18 PROCEDURE — 3077F PR MOST RECENT SYSTOLIC BLOOD PRESSURE >= 140 MM HG: ICD-10-PCS | Mod: CPTII,S$GLB,, | Performed by: FAMILY MEDICINE

## 2021-05-18 PROCEDURE — 83036 HEMOGLOBIN GLYCOSYLATED A1C: CPT | Performed by: FAMILY MEDICINE

## 2021-05-18 PROCEDURE — 80053 COMPREHEN METABOLIC PANEL: CPT | Performed by: FAMILY MEDICINE

## 2021-05-18 PROCEDURE — 36415 COLL VENOUS BLD VENIPUNCTURE: CPT | Mod: PO | Performed by: FAMILY MEDICINE

## 2021-05-18 PROCEDURE — 99999 PR PBB SHADOW E&M-EST. PATIENT-LVL III: CPT | Mod: PBBFAC,,, | Performed by: FAMILY MEDICINE

## 2021-05-18 PROCEDURE — 99499 RISK ADDL DX/OHS AUDIT: ICD-10-PCS | Mod: S$GLB,,, | Performed by: FAMILY MEDICINE

## 2021-05-18 PROCEDURE — 85025 COMPLETE CBC W/AUTO DIFF WBC: CPT | Performed by: FAMILY MEDICINE

## 2021-05-18 PROCEDURE — 84153 ASSAY OF PSA TOTAL: CPT | Performed by: FAMILY MEDICINE

## 2021-05-18 PROCEDURE — 1126F PR PAIN SEVERITY QUANTIFIED, NO PAIN PRESENT: ICD-10-PCS | Mod: S$GLB,,, | Performed by: FAMILY MEDICINE

## 2021-05-18 PROCEDURE — 80061 LIPID PANEL: CPT | Performed by: FAMILY MEDICINE

## 2021-05-18 PROCEDURE — 3008F BODY MASS INDEX DOCD: CPT | Mod: CPTII,S$GLB,, | Performed by: FAMILY MEDICINE

## 2021-05-18 PROCEDURE — 1101F PR PT FALLS ASSESS DOC 0-1 FALLS W/OUT INJ PAST YR: ICD-10-PCS | Mod: CPTII,S$GLB,, | Performed by: FAMILY MEDICINE

## 2021-05-18 PROCEDURE — 99499 UNLISTED E&M SERVICE: CPT | Mod: S$GLB,,, | Performed by: FAMILY MEDICINE

## 2021-05-18 PROCEDURE — 3080F DIAST BP >= 90 MM HG: CPT | Mod: CPTII,S$GLB,, | Performed by: FAMILY MEDICINE

## 2021-05-18 PROCEDURE — 3080F PR MOST RECENT DIASTOLIC BLOOD PRESSURE >= 90 MM HG: ICD-10-PCS | Mod: CPTII,S$GLB,, | Performed by: FAMILY MEDICINE

## 2021-05-18 PROCEDURE — 84443 ASSAY THYROID STIM HORMONE: CPT | Performed by: FAMILY MEDICINE

## 2021-05-18 PROCEDURE — 99214 OFFICE O/P EST MOD 30 MIN: CPT | Mod: S$GLB,,, | Performed by: FAMILY MEDICINE

## 2021-05-18 PROCEDURE — 1159F PR MEDICATION LIST DOCUMENTED IN MEDICAL RECORD: ICD-10-PCS | Mod: S$GLB,,, | Performed by: FAMILY MEDICINE

## 2021-05-18 PROCEDURE — 99999 PR PBB SHADOW E&M-EST. PATIENT-LVL III: ICD-10-PCS | Mod: PBBFAC,,, | Performed by: FAMILY MEDICINE

## 2021-05-18 PROCEDURE — 1159F MED LIST DOCD IN RCRD: CPT | Mod: S$GLB,,, | Performed by: FAMILY MEDICINE

## 2021-05-18 PROCEDURE — 1101F PT FALLS ASSESS-DOCD LE1/YR: CPT | Mod: CPTII,S$GLB,, | Performed by: FAMILY MEDICINE

## 2021-05-18 PROCEDURE — 99214 PR OFFICE/OUTPT VISIT, EST, LEVL IV, 30-39 MIN: ICD-10-PCS | Mod: S$GLB,,, | Performed by: FAMILY MEDICINE

## 2021-05-18 PROCEDURE — 1126F AMNT PAIN NOTED NONE PRSNT: CPT | Mod: S$GLB,,, | Performed by: FAMILY MEDICINE

## 2021-05-18 PROCEDURE — 3288F PR FALLS RISK ASSESSMENT DOCUMENTED: ICD-10-PCS | Mod: CPTII,S$GLB,, | Performed by: FAMILY MEDICINE

## 2021-05-18 RX ORDER — KETOROLAC TROMETHAMINE 5 MG/ML
SOLUTION OPHTHALMIC
COMMUNITY
Start: 2021-04-12 | End: 2021-05-18

## 2021-05-18 RX ORDER — GABAPENTIN 600 MG/1
600 TABLET ORAL 3 TIMES DAILY
Qty: 270 TABLET | Refills: 0 | Status: SHIPPED | OUTPATIENT
Start: 2021-05-18 | End: 2021-11-03 | Stop reason: SDUPTHER

## 2021-05-18 RX ORDER — MELOXICAM 15 MG/1
15 TABLET ORAL DAILY
Qty: 90 TABLET | Refills: 1 | Status: SHIPPED | OUTPATIENT
Start: 2021-05-18 | End: 2021-11-03 | Stop reason: SDUPTHER

## 2021-05-18 RX ORDER — OLMESARTAN MEDOXOMIL 20 MG/1
20 TABLET ORAL DAILY
Qty: 90 TABLET | Refills: 1 | Status: SHIPPED | OUTPATIENT
Start: 2021-05-18 | End: 2021-11-03 | Stop reason: SDUPTHER

## 2021-05-18 RX ORDER — DICLOFENAC SODIUM 1 MG/ML
SOLUTION/ DROPS OPHTHALMIC
COMMUNITY
Start: 2021-04-16 | End: 2021-05-18

## 2021-05-18 RX ORDER — DULOXETIN HYDROCHLORIDE 60 MG/1
60 CAPSULE, DELAYED RELEASE ORAL DAILY
Qty: 90 CAPSULE | Refills: 1 | Status: SHIPPED | OUTPATIENT
Start: 2021-05-18 | End: 2021-09-22

## 2021-05-18 RX ORDER — AMLODIPINE BESYLATE 5 MG/1
5 TABLET ORAL DAILY
Qty: 90 TABLET | Refills: 1 | Status: SHIPPED | OUTPATIENT
Start: 2021-05-18 | End: 2021-11-03 | Stop reason: SDUPTHER

## 2021-05-18 RX ORDER — FLUOCINONIDE 0.5 MG/G
OINTMENT TOPICAL DAILY PRN
Qty: 30 G | Refills: 2 | Status: SHIPPED | OUTPATIENT
Start: 2021-05-18

## 2021-05-23 ENCOUNTER — PATIENT MESSAGE (OUTPATIENT)
Dept: FAMILY MEDICINE | Facility: CLINIC | Age: 71
End: 2021-05-23

## 2021-10-28 DIAGNOSIS — M54.12 CERVICAL MYELOPATHY WITH CERVICAL RADICULOPATHY: ICD-10-CM

## 2021-10-28 DIAGNOSIS — M47.26 OSTEOARTHRITIS OF SPINE WITH RADICULOPATHY, LUMBAR REGION: ICD-10-CM

## 2021-10-28 DIAGNOSIS — G95.9 CERVICAL MYELOPATHY WITH CERVICAL RADICULOPATHY: ICD-10-CM

## 2021-10-29 RX ORDER — DULOXETIN HYDROCHLORIDE 60 MG/1
60 CAPSULE, DELAYED RELEASE ORAL DAILY
Qty: 90 CAPSULE | Refills: 0 | Status: SHIPPED | OUTPATIENT
Start: 2021-10-29 | End: 2021-11-09 | Stop reason: SDUPTHER

## 2021-11-03 DIAGNOSIS — M47.26 OSTEOARTHRITIS OF SPINE WITH RADICULOPATHY, LUMBAR REGION: ICD-10-CM

## 2021-11-03 DIAGNOSIS — I10 ESSENTIAL HYPERTENSION: ICD-10-CM

## 2021-11-03 DIAGNOSIS — M17.0 PRIMARY OSTEOARTHRITIS OF BOTH KNEES: ICD-10-CM

## 2021-11-03 DIAGNOSIS — G95.9 CERVICAL MYELOPATHY WITH CERVICAL RADICULOPATHY: ICD-10-CM

## 2021-11-03 DIAGNOSIS — M54.12 CERVICAL MYELOPATHY WITH CERVICAL RADICULOPATHY: ICD-10-CM

## 2021-11-04 RX ORDER — GABAPENTIN 600 MG/1
600 TABLET ORAL 3 TIMES DAILY
Qty: 270 TABLET | Refills: 0 | Status: SHIPPED | OUTPATIENT
Start: 2021-11-04 | End: 2021-11-09 | Stop reason: SDUPTHER

## 2021-11-04 RX ORDER — AMLODIPINE BESYLATE 5 MG/1
5 TABLET ORAL DAILY
Qty: 90 TABLET | Refills: 0 | Status: SHIPPED | OUTPATIENT
Start: 2021-11-04 | End: 2021-11-09 | Stop reason: SDUPTHER

## 2021-11-04 RX ORDER — OLMESARTAN MEDOXOMIL 20 MG/1
20 TABLET ORAL DAILY
Qty: 90 TABLET | Refills: 0 | Status: SHIPPED | OUTPATIENT
Start: 2021-11-04 | End: 2021-11-09 | Stop reason: SDUPTHER

## 2021-11-04 RX ORDER — MELOXICAM 15 MG/1
15 TABLET ORAL DAILY
Qty: 90 TABLET | Refills: 0 | Status: SHIPPED | OUTPATIENT
Start: 2021-11-04 | End: 2021-11-09 | Stop reason: SDUPTHER

## 2021-11-08 ENCOUNTER — OFFICE VISIT (OUTPATIENT)
Dept: SLEEP MEDICINE | Facility: CLINIC | Age: 71
End: 2021-11-08
Payer: MEDICARE

## 2021-11-08 VITALS
HEART RATE: 82 BPM | SYSTOLIC BLOOD PRESSURE: 151 MMHG | WEIGHT: 267.06 LBS | HEIGHT: 68 IN | DIASTOLIC BLOOD PRESSURE: 81 MMHG | BODY MASS INDEX: 40.48 KG/M2

## 2021-11-08 DIAGNOSIS — I10 ESSENTIAL HYPERTENSION: ICD-10-CM

## 2021-11-08 DIAGNOSIS — Z91.199 NONCOMPLIANCE WITH CPAP TREATMENT: ICD-10-CM

## 2021-11-08 DIAGNOSIS — G47.33 OSA (OBSTRUCTIVE SLEEP APNEA): Primary | ICD-10-CM

## 2021-11-08 DIAGNOSIS — Z12.11 COLON CANCER SCREENING: Primary | ICD-10-CM

## 2021-11-08 PROCEDURE — 3288F FALL RISK ASSESSMENT DOCD: CPT | Mod: HCNC,CPTII,S$GLB, | Performed by: INTERNAL MEDICINE

## 2021-11-08 PROCEDURE — 99999 PR PBB SHADOW E&M-EST. PATIENT-LVL III: CPT | Mod: PBBFAC,HCNC,, | Performed by: INTERNAL MEDICINE

## 2021-11-08 PROCEDURE — 3077F SYST BP >= 140 MM HG: CPT | Mod: HCNC,CPTII,S$GLB, | Performed by: INTERNAL MEDICINE

## 2021-11-08 PROCEDURE — 99203 PR OFFICE/OUTPT VISIT, NEW, LEVL III, 30-44 MIN: ICD-10-PCS | Mod: HCNC,S$GLB,, | Performed by: INTERNAL MEDICINE

## 2021-11-08 PROCEDURE — 1159F MED LIST DOCD IN RCRD: CPT | Mod: HCNC,CPTII,S$GLB, | Performed by: INTERNAL MEDICINE

## 2021-11-08 PROCEDURE — 3079F DIAST BP 80-89 MM HG: CPT | Mod: HCNC,CPTII,S$GLB, | Performed by: INTERNAL MEDICINE

## 2021-11-08 PROCEDURE — 1160F RVW MEDS BY RX/DR IN RCRD: CPT | Mod: HCNC,CPTII,S$GLB, | Performed by: INTERNAL MEDICINE

## 2021-11-08 PROCEDURE — 99499 UNLISTED E&M SERVICE: CPT | Mod: HCNC,S$GLB,, | Performed by: INTERNAL MEDICINE

## 2021-11-08 PROCEDURE — 3077F PR MOST RECENT SYSTOLIC BLOOD PRESSURE >= 140 MM HG: ICD-10-PCS | Mod: HCNC,CPTII,S$GLB, | Performed by: INTERNAL MEDICINE

## 2021-11-08 PROCEDURE — 4010F ACE/ARB THERAPY RXD/TAKEN: CPT | Mod: HCNC,CPTII,S$GLB, | Performed by: INTERNAL MEDICINE

## 2021-11-08 PROCEDURE — 1159F PR MEDICATION LIST DOCUMENTED IN MEDICAL RECORD: ICD-10-PCS | Mod: HCNC,CPTII,S$GLB, | Performed by: INTERNAL MEDICINE

## 2021-11-08 PROCEDURE — 99499 RISK ADDL DX/OHS AUDIT: ICD-10-PCS | Mod: HCNC,S$GLB,, | Performed by: INTERNAL MEDICINE

## 2021-11-08 PROCEDURE — 3079F PR MOST RECENT DIASTOLIC BLOOD PRESSURE 80-89 MM HG: ICD-10-PCS | Mod: HCNC,CPTII,S$GLB, | Performed by: INTERNAL MEDICINE

## 2021-11-08 PROCEDURE — 1126F PR PAIN SEVERITY QUANTIFIED, NO PAIN PRESENT: ICD-10-PCS | Mod: HCNC,CPTII,S$GLB, | Performed by: INTERNAL MEDICINE

## 2021-11-08 PROCEDURE — 1101F PR PT FALLS ASSESS DOC 0-1 FALLS W/OUT INJ PAST YR: ICD-10-PCS | Mod: HCNC,CPTII,S$GLB, | Performed by: INTERNAL MEDICINE

## 2021-11-08 PROCEDURE — 3044F HG A1C LEVEL LT 7.0%: CPT | Mod: HCNC,CPTII,S$GLB, | Performed by: INTERNAL MEDICINE

## 2021-11-08 PROCEDURE — 1160F PR REVIEW ALL MEDS BY PRESCRIBER/CLIN PHARMACIST DOCUMENTED: ICD-10-PCS | Mod: HCNC,CPTII,S$GLB, | Performed by: INTERNAL MEDICINE

## 2021-11-08 PROCEDURE — 3288F PR FALLS RISK ASSESSMENT DOCUMENTED: ICD-10-PCS | Mod: HCNC,CPTII,S$GLB, | Performed by: INTERNAL MEDICINE

## 2021-11-08 PROCEDURE — 1101F PT FALLS ASSESS-DOCD LE1/YR: CPT | Mod: HCNC,CPTII,S$GLB, | Performed by: INTERNAL MEDICINE

## 2021-11-08 PROCEDURE — 4010F PR ACE/ARB THEARPY RXD/TAKEN: ICD-10-PCS | Mod: HCNC,CPTII,S$GLB, | Performed by: INTERNAL MEDICINE

## 2021-11-08 PROCEDURE — 99999 PR PBB SHADOW E&M-EST. PATIENT-LVL III: ICD-10-PCS | Mod: PBBFAC,HCNC,, | Performed by: INTERNAL MEDICINE

## 2021-11-08 PROCEDURE — 3008F BODY MASS INDEX DOCD: CPT | Mod: HCNC,CPTII,S$GLB, | Performed by: INTERNAL MEDICINE

## 2021-11-08 PROCEDURE — 1126F AMNT PAIN NOTED NONE PRSNT: CPT | Mod: HCNC,CPTII,S$GLB, | Performed by: INTERNAL MEDICINE

## 2021-11-08 PROCEDURE — 99203 OFFICE O/P NEW LOW 30 MIN: CPT | Mod: HCNC,S$GLB,, | Performed by: INTERNAL MEDICINE

## 2021-11-08 PROCEDURE — 3008F PR BODY MASS INDEX (BMI) DOCUMENTED: ICD-10-PCS | Mod: HCNC,CPTII,S$GLB, | Performed by: INTERNAL MEDICINE

## 2021-11-08 PROCEDURE — 3044F PR MOST RECENT HEMOGLOBIN A1C LEVEL <7.0%: ICD-10-PCS | Mod: HCNC,CPTII,S$GLB, | Performed by: INTERNAL MEDICINE

## 2021-11-09 ENCOUNTER — OFFICE VISIT (OUTPATIENT)
Dept: FAMILY MEDICINE | Facility: CLINIC | Age: 71
End: 2021-11-09
Payer: MEDICARE

## 2021-11-09 VITALS
BODY MASS INDEX: 40.43 KG/M2 | OXYGEN SATURATION: 97 % | HEART RATE: 74 BPM | TEMPERATURE: 98 F | WEIGHT: 266.75 LBS | RESPIRATION RATE: 18 BRPM | DIASTOLIC BLOOD PRESSURE: 82 MMHG | SYSTOLIC BLOOD PRESSURE: 150 MMHG | HEIGHT: 68 IN

## 2021-11-09 DIAGNOSIS — I10 ESSENTIAL HYPERTENSION: Primary | ICD-10-CM

## 2021-11-09 DIAGNOSIS — M47.26 OSTEOARTHRITIS OF SPINE WITH RADICULOPATHY, LUMBAR REGION: ICD-10-CM

## 2021-11-09 DIAGNOSIS — G95.9 CERVICAL MYELOPATHY WITH CERVICAL RADICULOPATHY: ICD-10-CM

## 2021-11-09 DIAGNOSIS — H57.89 EYE IRRITATION: ICD-10-CM

## 2021-11-09 DIAGNOSIS — E78.89 ELEVATED HDL: ICD-10-CM

## 2021-11-09 DIAGNOSIS — Z12.11 COLON CANCER SCREENING: ICD-10-CM

## 2021-11-09 DIAGNOSIS — E55.9 VITAMIN D INSUFFICIENCY: ICD-10-CM

## 2021-11-09 DIAGNOSIS — M54.12 CERVICAL MYELOPATHY WITH CERVICAL RADICULOPATHY: ICD-10-CM

## 2021-11-09 DIAGNOSIS — M17.0 PRIMARY OSTEOARTHRITIS OF BOTH KNEES: ICD-10-CM

## 2021-11-09 PROCEDURE — 1126F AMNT PAIN NOTED NONE PRSNT: CPT | Mod: HCNC,CPTII,S$GLB, | Performed by: FAMILY MEDICINE

## 2021-11-09 PROCEDURE — 3288F PR FALLS RISK ASSESSMENT DOCUMENTED: ICD-10-PCS | Mod: HCNC,CPTII,S$GLB, | Performed by: FAMILY MEDICINE

## 2021-11-09 PROCEDURE — 3008F PR BODY MASS INDEX (BMI) DOCUMENTED: ICD-10-PCS | Mod: HCNC,CPTII,S$GLB, | Performed by: FAMILY MEDICINE

## 2021-11-09 PROCEDURE — 1160F PR REVIEW ALL MEDS BY PRESCRIBER/CLIN PHARMACIST DOCUMENTED: ICD-10-PCS | Mod: HCNC,CPTII,S$GLB, | Performed by: FAMILY MEDICINE

## 2021-11-09 PROCEDURE — 3044F HG A1C LEVEL LT 7.0%: CPT | Mod: HCNC,CPTII,S$GLB, | Performed by: FAMILY MEDICINE

## 2021-11-09 PROCEDURE — 4010F ACE/ARB THERAPY RXD/TAKEN: CPT | Mod: HCNC,CPTII,S$GLB, | Performed by: FAMILY MEDICINE

## 2021-11-09 PROCEDURE — 1126F PR PAIN SEVERITY QUANTIFIED, NO PAIN PRESENT: ICD-10-PCS | Mod: HCNC,CPTII,S$GLB, | Performed by: FAMILY MEDICINE

## 2021-11-09 PROCEDURE — 1160F RVW MEDS BY RX/DR IN RCRD: CPT | Mod: HCNC,CPTII,S$GLB, | Performed by: FAMILY MEDICINE

## 2021-11-09 PROCEDURE — 1159F MED LIST DOCD IN RCRD: CPT | Mod: HCNC,CPTII,S$GLB, | Performed by: FAMILY MEDICINE

## 2021-11-09 PROCEDURE — 3077F PR MOST RECENT SYSTOLIC BLOOD PRESSURE >= 140 MM HG: ICD-10-PCS | Mod: HCNC,CPTII,S$GLB, | Performed by: FAMILY MEDICINE

## 2021-11-09 PROCEDURE — 99214 PR OFFICE/OUTPT VISIT, EST, LEVL IV, 30-39 MIN: ICD-10-PCS | Mod: HCNC,S$GLB,, | Performed by: FAMILY MEDICINE

## 2021-11-09 PROCEDURE — 99214 OFFICE O/P EST MOD 30 MIN: CPT | Mod: HCNC,S$GLB,, | Performed by: FAMILY MEDICINE

## 2021-11-09 PROCEDURE — 3044F PR MOST RECENT HEMOGLOBIN A1C LEVEL <7.0%: ICD-10-PCS | Mod: HCNC,CPTII,S$GLB, | Performed by: FAMILY MEDICINE

## 2021-11-09 PROCEDURE — 99999 PR PBB SHADOW E&M-EST. PATIENT-LVL IV: CPT | Mod: PBBFAC,HCNC,, | Performed by: FAMILY MEDICINE

## 2021-11-09 PROCEDURE — 4010F PR ACE/ARB THEARPY RXD/TAKEN: ICD-10-PCS | Mod: HCNC,CPTII,S$GLB, | Performed by: FAMILY MEDICINE

## 2021-11-09 PROCEDURE — 1159F PR MEDICATION LIST DOCUMENTED IN MEDICAL RECORD: ICD-10-PCS | Mod: HCNC,CPTII,S$GLB, | Performed by: FAMILY MEDICINE

## 2021-11-09 PROCEDURE — 3079F PR MOST RECENT DIASTOLIC BLOOD PRESSURE 80-89 MM HG: ICD-10-PCS | Mod: HCNC,CPTII,S$GLB, | Performed by: FAMILY MEDICINE

## 2021-11-09 PROCEDURE — 99999 PR PBB SHADOW E&M-EST. PATIENT-LVL IV: ICD-10-PCS | Mod: PBBFAC,HCNC,, | Performed by: FAMILY MEDICINE

## 2021-11-09 PROCEDURE — 3079F DIAST BP 80-89 MM HG: CPT | Mod: HCNC,CPTII,S$GLB, | Performed by: FAMILY MEDICINE

## 2021-11-09 PROCEDURE — 3288F FALL RISK ASSESSMENT DOCD: CPT | Mod: HCNC,CPTII,S$GLB, | Performed by: FAMILY MEDICINE

## 2021-11-09 PROCEDURE — 1101F PR PT FALLS ASSESS DOC 0-1 FALLS W/OUT INJ PAST YR: ICD-10-PCS | Mod: HCNC,CPTII,S$GLB, | Performed by: FAMILY MEDICINE

## 2021-11-09 PROCEDURE — 3008F BODY MASS INDEX DOCD: CPT | Mod: HCNC,CPTII,S$GLB, | Performed by: FAMILY MEDICINE

## 2021-11-09 PROCEDURE — 3077F SYST BP >= 140 MM HG: CPT | Mod: HCNC,CPTII,S$GLB, | Performed by: FAMILY MEDICINE

## 2021-11-09 PROCEDURE — 1101F PT FALLS ASSESS-DOCD LE1/YR: CPT | Mod: HCNC,CPTII,S$GLB, | Performed by: FAMILY MEDICINE

## 2021-11-09 RX ORDER — MELOXICAM 15 MG/1
15 TABLET ORAL DAILY
Qty: 90 TABLET | Refills: 3 | Status: SHIPPED | OUTPATIENT
Start: 2022-01-10 | End: 2021-11-11 | Stop reason: SDUPTHER

## 2021-11-09 RX ORDER — DULOXETIN HYDROCHLORIDE 60 MG/1
60 CAPSULE, DELAYED RELEASE ORAL DAILY
Qty: 90 CAPSULE | Refills: 3 | Status: SHIPPED | OUTPATIENT
Start: 2022-01-10 | End: 2021-11-11 | Stop reason: SDUPTHER

## 2021-11-09 RX ORDER — AMLODIPINE BESYLATE 5 MG/1
5 TABLET ORAL DAILY
Qty: 90 TABLET | Refills: 3 | Status: SHIPPED | OUTPATIENT
Start: 2022-01-10 | End: 2021-11-11 | Stop reason: SDUPTHER

## 2021-11-09 RX ORDER — GABAPENTIN 600 MG/1
600 TABLET ORAL 3 TIMES DAILY
Qty: 270 TABLET | Refills: 3 | Status: SHIPPED | OUTPATIENT
Start: 2022-01-10 | End: 2021-11-12 | Stop reason: SDUPTHER

## 2021-11-09 RX ORDER — OLMESARTAN MEDOXOMIL 20 MG/1
20 TABLET ORAL DAILY
Qty: 90 TABLET | Refills: 3 | Status: SHIPPED | OUTPATIENT
Start: 2022-01-10 | End: 2021-11-11 | Stop reason: SDUPTHER

## 2021-11-09 RX ORDER — KETOROLAC TROMETHAMINE 5 MG/ML
SOLUTION OPHTHALMIC
Qty: 5 ML | Refills: 2 | Status: SHIPPED | OUTPATIENT
Start: 2021-11-09

## 2021-11-11 DIAGNOSIS — I10 ESSENTIAL HYPERTENSION: ICD-10-CM

## 2021-11-11 DIAGNOSIS — G95.9 CERVICAL MYELOPATHY WITH CERVICAL RADICULOPATHY: ICD-10-CM

## 2021-11-11 DIAGNOSIS — M54.12 CERVICAL MYELOPATHY WITH CERVICAL RADICULOPATHY: ICD-10-CM

## 2021-11-11 DIAGNOSIS — M17.0 PRIMARY OSTEOARTHRITIS OF BOTH KNEES: ICD-10-CM

## 2021-11-11 DIAGNOSIS — M47.26 OSTEOARTHRITIS OF SPINE WITH RADICULOPATHY, LUMBAR REGION: ICD-10-CM

## 2021-11-11 RX ORDER — OLMESARTAN MEDOXOMIL 20 MG/1
20 TABLET ORAL DAILY
Qty: 90 TABLET | Refills: 3 | Status: SHIPPED | OUTPATIENT
Start: 2022-01-10 | End: 2022-03-21 | Stop reason: SDUPTHER

## 2021-11-11 RX ORDER — AMLODIPINE BESYLATE 5 MG/1
5 TABLET ORAL DAILY
Qty: 90 TABLET | Refills: 3 | Status: SHIPPED | OUTPATIENT
Start: 2022-01-10 | End: 2022-02-01 | Stop reason: SDUPTHER

## 2021-11-11 RX ORDER — MELOXICAM 15 MG/1
15 TABLET ORAL DAILY
Qty: 90 TABLET | Refills: 3 | Status: SHIPPED | OUTPATIENT
Start: 2022-01-10 | End: 2022-03-15 | Stop reason: SDUPTHER

## 2021-11-11 RX ORDER — DULOXETIN HYDROCHLORIDE 60 MG/1
60 CAPSULE, DELAYED RELEASE ORAL DAILY
Qty: 90 CAPSULE | Refills: 3 | Status: SHIPPED | OUTPATIENT
Start: 2022-01-10 | End: 2022-03-15 | Stop reason: SDUPTHER

## 2021-11-12 RX ORDER — GABAPENTIN 600 MG/1
600 TABLET ORAL 3 TIMES DAILY
Qty: 270 TABLET | Refills: 3 | Status: SHIPPED | OUTPATIENT
Start: 2022-01-10 | End: 2022-03-21 | Stop reason: SDUPTHER

## 2021-11-17 ENCOUNTER — HOSPITAL ENCOUNTER (OUTPATIENT)
Dept: SLEEP MEDICINE | Facility: HOSPITAL | Age: 71
Discharge: HOME | End: 2021-11-17
Attending: INTERNAL MEDICINE
Payer: MEDICARE

## 2021-11-17 DIAGNOSIS — G47.33 OSA (OBSTRUCTIVE SLEEP APNEA): ICD-10-CM

## 2021-11-17 DIAGNOSIS — I10 ESSENTIAL HYPERTENSION: ICD-10-CM

## 2021-11-17 DIAGNOSIS — Z91.199 NONCOMPLIANCE WITH CPAP TREATMENT: ICD-10-CM

## 2021-11-17 PROCEDURE — 95810 POLYSOM 6/> YRS 4/> PARAM: CPT | Mod: 26,HCNC,, | Performed by: INTERNAL MEDICINE

## 2021-11-17 PROCEDURE — 95810 PR POLYSOMNOGRAPHY, 4 OR MORE: ICD-10-PCS | Mod: 26,HCNC,, | Performed by: INTERNAL MEDICINE

## 2021-11-17 PROCEDURE — 95810 POLYSOM 6/> YRS 4/> PARAM: CPT | Mod: HCNC

## 2021-12-04 ENCOUNTER — PATIENT MESSAGE (OUTPATIENT)
Dept: SLEEP MEDICINE | Facility: CLINIC | Age: 71
End: 2021-12-04
Payer: MEDICARE

## 2021-12-04 DIAGNOSIS — G47.33 OSA (OBSTRUCTIVE SLEEP APNEA): Primary | ICD-10-CM

## 2021-12-15 ENCOUNTER — PATIENT MESSAGE (OUTPATIENT)
Dept: ADMINISTRATIVE | Facility: HOSPITAL | Age: 71
End: 2021-12-15
Payer: MEDICARE

## 2022-02-01 ENCOUNTER — TELEPHONE (OUTPATIENT)
Dept: FAMILY MEDICINE | Facility: CLINIC | Age: 72
End: 2022-02-01
Payer: MEDICARE

## 2022-02-01 DIAGNOSIS — I10 ESSENTIAL HYPERTENSION: ICD-10-CM

## 2022-02-01 RX ORDER — AMLODIPINE BESYLATE 5 MG/1
5 TABLET ORAL DAILY
Qty: 90 TABLET | Refills: 0 | Status: SHIPPED | OUTPATIENT
Start: 2022-02-01 | End: 2022-03-21 | Stop reason: SDUPTHER

## 2022-02-01 NOTE — TELEPHONE ENCOUNTER
"Spoke with [facility] pharmacy stated Rx for amLODIPine (NORVASC) 5 MG tablet was too soon for . Rx was picked up last week for 90 days but meloxicam ready for . Notified patient spouse. Patient spouse stated never picked up 90 days only have a few pills left in bottle. She was upset with [facility] - stated never receive 90 days and do not want to speak with manager. Notified pt spouse can send to [facility] for 90 days to pay out of pocket. Pt ok to pay stated "I rather pay, it is cheaper then catching a stroke." Confirm with Lucita from [facility]  - 90 days for $24. Patient spouse will  Rx.   "

## 2022-02-01 NOTE — TELEPHONE ENCOUNTER
----- Message from Tasia Harrington sent at 2/1/2022  3:00 PM CST -----  Type: Patient Call Back       What is the request in detail: pt calling to speak to a nurse regarding status on medication      Can the clinic reply by [facility]? No       Would the patient rather a call back or a response via [facility]? Call back       Best call back number: [phone]    Thank you.

## 2022-02-01 NOTE — TELEPHONE ENCOUNTER
No new care gaps identified.  Powered by [employer]. Reference number: [id number].   2/01/2022 3:31:04 PM CST

## 2022-02-22 ENCOUNTER — TELEPHONE (OUTPATIENT)
Dept: FAMILY MEDICINE | Facility: CLINIC | Age: 72
End: 2022-02-22
Payer: MEDICARE

## 2022-02-24 ENCOUNTER — CLINICAL SUPPORT (OUTPATIENT)
Dept: FAMILY MEDICINE | Facility: CLINIC | Age: 72
End: 2022-02-24
Payer: MEDICARE

## 2022-02-24 VITALS — SYSTOLIC BLOOD PRESSURE: 146 MMHG | DIASTOLIC BLOOD PRESSURE: 76 MMHG

## 2022-02-24 DIAGNOSIS — I10 ESSENTIAL HYPERTENSION: Primary | ICD-10-CM

## 2022-02-24 PROCEDURE — 99999 PR PBB SHADOW E&M-EST. PATIENT-LVL I: ICD-10-PCS | Mod: PBBFAC,HCNC,,

## 2022-02-24 PROCEDURE — 99999 PR PBB SHADOW E&M-EST. PATIENT-LVL I: CPT | Mod: PBBFAC,HCNC,,

## 2022-02-24 NOTE — PROGRESS NOTES
Bryan Queen 71 y.o. male is here today for Blood Pressure check.   History of HTN yes.    Review of patient's allergies indicates:   Allergen Reactions    Hydroxyzine Nausea And Vomiting and Other (See Comments)     SWEATING    Lyrica [pregabalin] Rash    Tramadol Nausea And Vomiting     Nauseous,Sweating, Ringing in the ears    Vicodin  [hydrocodone-acetaminophen]      Other reaction(s): Rash     Creatinine   Date Value Ref Range Status   05/18/2021 1.4 0.5 - 1.4 mg/dL Final     Sodium   Date Value Ref Range Status   05/18/2021 139 136 - 145 mmol/L Final     Potassium   Date Value Ref Range Status   05/18/2021 4.5 3.5 - 5.1 mmol/L Final   ]  Patient verifies taking blood pressure medications on a regular basis at the same time of the day.     Current Outpatient Medications:     amLODIPine (NORVASC) 5 MG tablet, Take 1 tablet (5 mg total) by mouth once daily., Disp: 90 tablet, Rfl: 0    DULoxetine (CYMBALTA) 60 MG capsule, Take 1 capsule (60 mg total) by mouth once daily., Disp: 90 capsule, Rfl: 3    fluocinonide (LIDEX) 0.05 % ointment, Apply topically daily as needed., Disp: 30 g, Rfl: 2    gabapentin (NEURONTIN) 600 MG tablet, Take 1 tablet (600 mg total) by mouth 3 (three) times daily., Disp: 270 tablet, Rfl: 3    ketorolac 0.5% (ACULAR) 0.5 % Drop, INSTILL 1 DROP INTO RIGHT EYE FOR UP TO FOUR TIMES A DAY AS NEEDED FOR IRRITATION, Disp: 5 mL, Rfl: 2    meloxicam (MOBIC) 15 MG tablet, Take 1 tablet (15 mg total) by mouth once daily., Disp: 90 tablet, Rfl: 3    naproxen sodium (ALEVE ORAL), Take by mouth. Take 1 to 2 tablet daily for additional pain relief, Disp: , Rfl:     olmesartan (BENICAR) 20 MG tablet, Take 1 tablet (20 mg total) by mouth once daily., Disp: 90 tablet, Rfl: 3    OMEGA 3,6,9 COMBINATION NO.7 (OMEGA DHA ORAL), Take 1 capsule by mouth 2 (two) times daily., Disp: , Rfl:   Does patient have record of home blood pressure readings no. Readings have been averaging not done.   Last dose  of blood pressure medication was taken at this morning.  Patient is asymptomatic.   Complains of no complaints.    Vitals:    02/24/22 1117 02/24/22 1135   BP: (!) 150/80 (!) 146/76   BP Location: Left arm Left arm   Patient Position: Sitting Sitting   BP Method: Large (Manual) Large (Manual)         Dr. Lombard informed of nurse visit.

## 2022-03-15 DIAGNOSIS — M54.12 CERVICAL MYELOPATHY WITH CERVICAL RADICULOPATHY: ICD-10-CM

## 2022-03-15 DIAGNOSIS — M47.26 OSTEOARTHRITIS OF SPINE WITH RADICULOPATHY, LUMBAR REGION: ICD-10-CM

## 2022-03-15 DIAGNOSIS — G95.9 CERVICAL MYELOPATHY WITH CERVICAL RADICULOPATHY: ICD-10-CM

## 2022-03-15 DIAGNOSIS — M17.0 PRIMARY OSTEOARTHRITIS OF BOTH KNEES: ICD-10-CM

## 2022-03-15 NOTE — TELEPHONE ENCOUNTER
Last Office Visit Info:   The patient's last visit with Azikiwe K Lombard, MD was on 11/9/2021.    The patient's last visit in current department was on 2/24/2022.        Last CBC Results:   Lab Results   Component Value Date    WBC 5.90 05/18/2021    HGB 13.1 (L) 05/18/2021    HCT 42.6 05/18/2021     05/18/2021       Last CMP Results  Lab Results   Component Value Date     05/18/2021    K 4.5 05/18/2021     05/18/2021    CO2 25 05/18/2021    BUN 16 05/18/2021    CREATININE 1.4 05/18/2021    CALCIUM 8.8 05/18/2021    ALBUMIN 3.5 05/18/2021    AST 16 05/18/2021    ALT 16 05/18/2021       Last Lipids  Lab Results   Component Value Date    CHOL 154 05/18/2021    TRIG 92 05/18/2021    HDL 38 (L) 05/18/2021    LDLCALC 97.6 05/18/2021       Last A1C  Lab Results   Component Value Date    HGBA1C 5.6 05/18/2021       Last TSH  Lab Results   Component Value Date    TSH 2.852 05/18/2021             Current Med Refills  Medication List with Changes/Refills   Current Medications    AMLODIPINE (NORVASC) 5 MG TABLET    Take 1 tablet (5 mg total) by mouth once daily.       Start Date: 2/1/2022  End Date: --    DULOXETINE (CYMBALTA) 60 MG CAPSULE    Take 1 capsule (60 mg total) by mouth once daily.       Start Date: 1/10/2022 End Date: --    FLUOCINONIDE (LIDEX) 0.05 % OINTMENT    Apply topically daily as needed.       Start Date: 5/18/2021 End Date: --    GABAPENTIN (NEURONTIN) 600 MG TABLET    Take 1 tablet (600 mg total) by mouth 3 (three) times daily.       Start Date: 1/10/2022 End Date: --    KETOROLAC 0.5% (ACULAR) 0.5 % DROP    INSTILL 1 DROP INTO RIGHT EYE FOR UP TO FOUR TIMES A DAY AS NEEDED FOR IRRITATION       Start Date: 11/9/2021 End Date: --    MELOXICAM (MOBIC) 15 MG TABLET    Take 1 tablet (15 mg total) by mouth once daily.       Start Date: 1/10/2022 End Date: --    NAPROXEN SODIUM (ALEVE ORAL)    Take by mouth. Take 1 to 2 tablet daily for additional pain relief       Start Date: --        End  Date: --    OLMESARTAN (BENICAR) 20 MG TABLET    Take 1 tablet (20 mg total) by mouth once daily.       Start Date: 1/10/2022 End Date: --    OMEGA 3,6,9 COMBINATION NO.7 (OMEGA DHA ORAL)    Take 1 capsule by mouth 2 (two) times daily.       Start Date: --        End Date: --       Order(s) placed per written order guidelines:       Please advise.

## 2022-03-16 RX ORDER — MELOXICAM 15 MG/1
15 TABLET ORAL DAILY
Qty: 90 TABLET | Refills: 3 | Status: SHIPPED | OUTPATIENT
Start: 2022-03-16 | End: 2022-08-15 | Stop reason: SDUPTHER

## 2022-03-16 RX ORDER — DULOXETIN HYDROCHLORIDE 60 MG/1
60 CAPSULE, DELAYED RELEASE ORAL DAILY
Qty: 90 CAPSULE | Refills: 3 | Status: SHIPPED | OUTPATIENT
Start: 2022-03-16 | End: 2022-08-15 | Stop reason: SDUPTHER

## 2022-03-21 ENCOUNTER — PATIENT OUTREACH (OUTPATIENT)
Dept: ADMINISTRATIVE | Facility: HOSPITAL | Age: 72
End: 2022-03-21
Payer: MEDICARE

## 2022-03-21 DIAGNOSIS — G95.9 CERVICAL MYELOPATHY WITH CERVICAL RADICULOPATHY: ICD-10-CM

## 2022-03-21 DIAGNOSIS — M54.12 CERVICAL MYELOPATHY WITH CERVICAL RADICULOPATHY: ICD-10-CM

## 2022-03-21 DIAGNOSIS — M17.0 PRIMARY OSTEOARTHRITIS OF BOTH KNEES: ICD-10-CM

## 2022-03-21 DIAGNOSIS — I10 ESSENTIAL HYPERTENSION: ICD-10-CM

## 2022-03-21 DIAGNOSIS — M47.26 OSTEOARTHRITIS OF SPINE WITH RADICULOPATHY, LUMBAR REGION: ICD-10-CM

## 2022-03-21 RX ORDER — OLMESARTAN MEDOXOMIL 20 MG/1
20 TABLET ORAL DAILY
Qty: 90 TABLET | Refills: 0 | Status: SHIPPED | OUTPATIENT
Start: 2022-03-21 | End: 2022-07-25 | Stop reason: SDUPTHER

## 2022-03-21 RX ORDER — AMLODIPINE BESYLATE 5 MG/1
5 TABLET ORAL DAILY
Qty: 90 TABLET | Refills: 0 | Status: SHIPPED | OUTPATIENT
Start: 2022-03-21 | End: 2022-07-25 | Stop reason: SDUPTHER

## 2022-03-21 RX ORDER — GABAPENTIN 600 MG/1
600 TABLET ORAL 3 TIMES DAILY
Qty: 270 TABLET | Refills: 0 | Status: SHIPPED | OUTPATIENT
Start: 2022-03-21 | End: 2022-07-25 | Stop reason: SDUPTHER

## 2022-03-21 NOTE — TELEPHONE ENCOUNTER
----- Message from Lee Ann Sanchez sent at 3/21/2022  2:43 PM CDT -----  Type: RX Refill Request    Who Called: Prem Huddleston    Have you contacted your pharmacy: yes     Refill or New Rx: refill     RX Name and Strength:gabapentin (NEURONTIN) 600 MG tablet, amLODIPine (NORVASC) 5 MG tablet, olmesartan (BENICAR) 20 MG tablet    Preferred Pharmacy with phone number:     [facility] - [facility] [address]  [address] [address]  Phone: [phone] Fax: [phone]    Local or Mail Order: mail     Would the patient rather a call back or a response via [facility]? Call back     Best Call Back Number: Phone: [phone] Fax: [phone]

## 2022-03-21 NOTE — TELEPHONE ENCOUNTER
No new care gaps identified.  Powered by [employer]. Reference number: [id number].   3/21/2022 3:01:41 PM CDT

## 2022-05-02 ENCOUNTER — TELEPHONE (OUTPATIENT)
Dept: FAMILY MEDICINE | Facility: CLINIC | Age: 72
End: 2022-05-02
Payer: MEDICARE

## 2022-05-11 ENCOUNTER — PATIENT MESSAGE (OUTPATIENT)
Dept: ADMINISTRATIVE | Facility: HOSPITAL | Age: 72
End: 2022-05-11
Payer: MEDICARE

## 2022-05-11 ENCOUNTER — PATIENT OUTREACH (OUTPATIENT)
Dept: ADMINISTRATIVE | Facility: HOSPITAL | Age: 72
End: 2022-05-11
Payer: MEDICARE

## 2022-05-12 ENCOUNTER — PATIENT MESSAGE (OUTPATIENT)
Dept: ENDOSCOPY | Facility: HOSPITAL | Age: 72
End: 2022-05-12
Payer: MEDICARE

## 2022-05-16 ENCOUNTER — TELEPHONE (OUTPATIENT)
Dept: FAMILY MEDICINE | Facility: CLINIC | Age: 72
End: 2022-05-16
Payer: MEDICARE

## 2022-05-26 ENCOUNTER — TELEPHONE (OUTPATIENT)
Dept: FAMILY MEDICINE | Facility: CLINIC | Age: 72
End: 2022-05-26
Payer: MEDICARE

## 2022-05-31 NOTE — TELEPHONE ENCOUNTER
No new care gaps identified.  Powered by [employer]. Reference number: [id number].   3/15/2022 4:10:35 PM CDT   Ivermectin Counseling:  Patient instructed to take medication on an empty stomach with a full glass of water.  Patient informed of potential adverse effects including but not limited to nausea, diarrhea, dizziness, itching, and swelling of the extremities or lymph nodes.  The patient verbalized understanding of the proper use and possible adverse effects of ivermectin.  All of the patient's questions and concerns were addressed.

## 2022-07-21 NOTE — TELEPHONE ENCOUNTER
Calling pt to get updated bp reading. Lm for pt to call our office back.    Aortic stenosis Chronic systolic congestive heart failure

## 2022-07-25 ENCOUNTER — PATIENT OUTREACH (OUTPATIENT)
Dept: ADMINISTRATIVE | Facility: HOSPITAL | Age: 72
End: 2022-07-25
Payer: MEDICARE

## 2022-07-25 DIAGNOSIS — G95.9 CERVICAL MYELOPATHY WITH CERVICAL RADICULOPATHY: ICD-10-CM

## 2022-07-25 DIAGNOSIS — M17.0 PRIMARY OSTEOARTHRITIS OF BOTH KNEES: ICD-10-CM

## 2022-07-25 DIAGNOSIS — M47.26 OSTEOARTHRITIS OF SPINE WITH RADICULOPATHY, LUMBAR REGION: ICD-10-CM

## 2022-07-25 DIAGNOSIS — I10 ESSENTIAL HYPERTENSION: ICD-10-CM

## 2022-07-25 DIAGNOSIS — M54.12 CERVICAL MYELOPATHY WITH CERVICAL RADICULOPATHY: ICD-10-CM

## 2022-07-25 NOTE — TELEPHONE ENCOUNTER
No new care gaps identified.  [facility] Embedded Care Gaps. Reference number: [id number]. 7/25/2022   3:47:17 PM CDT

## 2022-07-25 NOTE — PROGRESS NOTES
Called patient to establish care with new PCP  Patient would like to establish care with Dr. Finn  Appointment scheduled

## 2022-07-27 RX ORDER — AMLODIPINE BESYLATE 5 MG/1
5 TABLET ORAL DAILY
Qty: 90 TABLET | Refills: 0 | Status: SHIPPED | OUTPATIENT
Start: 2022-07-27 | End: 2022-09-28

## 2022-07-27 RX ORDER — MELOXICAM 15 MG/1
15 TABLET ORAL DAILY
Qty: 90 TABLET | Refills: 3 | OUTPATIENT
Start: 2022-07-27

## 2022-07-27 RX ORDER — GABAPENTIN 600 MG/1
600 TABLET ORAL 3 TIMES DAILY
Qty: 270 TABLET | Refills: 0 | Status: SHIPPED | OUTPATIENT
Start: 2022-07-27 | End: 2023-09-05 | Stop reason: SDUPTHER

## 2022-07-27 RX ORDER — OLMESARTAN MEDOXOMIL 20 MG/1
20 TABLET ORAL DAILY
Qty: 90 TABLET | Refills: 0 | Status: SHIPPED | OUTPATIENT
Start: 2022-07-27 | End: 2022-09-28

## 2022-08-09 ENCOUNTER — TELEPHONE (OUTPATIENT)
Dept: FAMILY MEDICINE | Facility: CLINIC | Age: 72
End: 2022-08-09
Payer: MEDICARE

## 2022-08-09 NOTE — TELEPHONE ENCOUNTER
----- Message from Sonia Portillo sent at 8/9/2022 11:44 AM CDT -----  Type: Patient Call Back    Who called: Wife    What is the request in detail: Pt is requesting for meloxicam (MOBIC) 15 MG tablet to be refilled and states he does not have an issue with his kidney function. It was something that was supposed to be taken out of his chart a while ago. Please call    Can the clinic reply by [facility]? no    Would the patient rather a call back or a response via [facility]? call    Best call back number:[phone] (Wife)

## 2022-08-15 ENCOUNTER — OFFICE VISIT (OUTPATIENT)
Dept: FAMILY MEDICINE | Facility: CLINIC | Age: 72
End: 2022-08-15
Payer: MEDICARE

## 2022-08-15 ENCOUNTER — LAB VISIT (OUTPATIENT)
Dept: LAB | Facility: HOSPITAL | Age: 72
End: 2022-08-15
Attending: FAMILY MEDICINE
Payer: MEDICARE

## 2022-08-15 VITALS
BODY MASS INDEX: 40.88 KG/M2 | DIASTOLIC BLOOD PRESSURE: 64 MMHG | WEIGHT: 269.75 LBS | HEART RATE: 82 BPM | SYSTOLIC BLOOD PRESSURE: 108 MMHG | OXYGEN SATURATION: 95 % | HEIGHT: 68 IN | TEMPERATURE: 99 F

## 2022-08-15 DIAGNOSIS — Z12.5 ENCOUNTER FOR SCREENING FOR MALIGNANT NEOPLASM OF PROSTATE: ICD-10-CM

## 2022-08-15 DIAGNOSIS — G95.9 CERVICAL MYELOPATHY WITH CERVICAL RADICULOPATHY: ICD-10-CM

## 2022-08-15 DIAGNOSIS — M47.26 OSTEOARTHRITIS OF SPINE WITH RADICULOPATHY, LUMBAR REGION: ICD-10-CM

## 2022-08-15 DIAGNOSIS — M54.12 CERVICAL MYELOPATHY WITH CERVICAL RADICULOPATHY: ICD-10-CM

## 2022-08-15 DIAGNOSIS — M17.0 PRIMARY OSTEOARTHRITIS OF BOTH KNEES: ICD-10-CM

## 2022-08-15 DIAGNOSIS — H91.90 HEARING LOSS, UNSPECIFIED HEARING LOSS TYPE, UNSPECIFIED LATERALITY: ICD-10-CM

## 2022-08-15 DIAGNOSIS — R26.81 GAIT INSTABILITY: ICD-10-CM

## 2022-08-15 DIAGNOSIS — Z12.11 ENCOUNTER FOR SCREENING FOR MALIGNANT NEOPLASM OF COLON: ICD-10-CM

## 2022-08-15 DIAGNOSIS — Z00.00 ROUTINE PHYSICAL EXAMINATION: Primary | ICD-10-CM

## 2022-08-15 DIAGNOSIS — N18.30 STAGE 3 CHRONIC KIDNEY DISEASE, UNSPECIFIED WHETHER STAGE 3A OR 3B CKD: ICD-10-CM

## 2022-08-15 DIAGNOSIS — N52.9 ERECTILE DYSFUNCTION, UNSPECIFIED ERECTILE DYSFUNCTION TYPE: ICD-10-CM

## 2022-08-15 DIAGNOSIS — I10 ESSENTIAL HYPERTENSION: ICD-10-CM

## 2022-08-15 DIAGNOSIS — Z00.00 ROUTINE PHYSICAL EXAMINATION: ICD-10-CM

## 2022-08-15 PROBLEM — E66.01 MORBID OBESITY DUE TO EXCESS CALORIES: Chronic | Status: ACTIVE | Noted: 2017-08-17

## 2022-08-15 LAB
ALBUMIN SERPL BCP-MCNC: 3.9 G/DL (ref 3.5–5.2)
ALP SERPL-CCNC: 69 U/L (ref 55–135)
ALT SERPL W/O P-5'-P-CCNC: 15 U/L (ref 10–44)
ANION GAP SERPL CALC-SCNC: 7 MMOL/L (ref 8–16)
AST SERPL-CCNC: 17 U/L (ref 10–40)
BASOPHILS # BLD AUTO: 0.1 K/UL (ref 0–0.2)
BASOPHILS NFR BLD: 1.6 % (ref 0–1.9)
BILIRUB SERPL-MCNC: 0.6 MG/DL (ref 0.1–1)
BUN SERPL-MCNC: 18 MG/DL (ref 8–23)
CALCIUM SERPL-MCNC: 10.1 MG/DL (ref 8.7–10.5)
CHLORIDE SERPL-SCNC: 105 MMOL/L (ref 95–110)
CHOLEST SERPL-MCNC: 152 MG/DL (ref 120–199)
CHOLEST/HDLC SERPL: 4.8 {RATIO} (ref 2–5)
CO2 SERPL-SCNC: 27 MMOL/L (ref 23–29)
COMPLEXED PSA SERPL-MCNC: <0.01 NG/ML (ref 0–4)
CREAT SERPL-MCNC: 1.4 MG/DL (ref 0.5–1.4)
DIFFERENTIAL METHOD: ABNORMAL
EOSINOPHIL # BLD AUTO: 0.2 K/UL (ref 0–0.5)
EOSINOPHIL NFR BLD: 3.7 % (ref 0–8)
ERYTHROCYTE [DISTWIDTH] IN BLOOD BY AUTOMATED COUNT: 13.8 % (ref 11.5–14.5)
EST. GFR  (NO RACE VARIABLE): 53.7 ML/MIN/1.73 M^2
ESTIMATED AVG GLUCOSE: 117 MG/DL (ref 68–131)
GLUCOSE SERPL-MCNC: 118 MG/DL (ref 70–110)
HBA1C MFR BLD: 5.7 % (ref 4–5.6)
HCT VFR BLD AUTO: 41.1 % (ref 40–54)
HDLC SERPL-MCNC: 32 MG/DL (ref 40–75)
HDLC SERPL: 21.1 % (ref 20–50)
HGB BLD-MCNC: 13.1 G/DL (ref 14–18)
IMM GRANULOCYTES # BLD AUTO: 0.02 K/UL (ref 0–0.04)
IMM GRANULOCYTES NFR BLD AUTO: 0.3 % (ref 0–0.5)
LDLC SERPL CALC-MCNC: 98 MG/DL (ref 63–159)
LYMPHOCYTES # BLD AUTO: 2.7 K/UL (ref 1–4.8)
LYMPHOCYTES NFR BLD: 42.8 % (ref 18–48)
MCH RBC QN AUTO: 30.5 PG (ref 27–31)
MCHC RBC AUTO-ENTMCNC: 31.9 G/DL (ref 32–36)
MCV RBC AUTO: 96 FL (ref 82–98)
MONOCYTES # BLD AUTO: 0.7 K/UL (ref 0.3–1)
MONOCYTES NFR BLD: 11.3 % (ref 4–15)
NEUTROPHILS # BLD AUTO: 2.5 K/UL (ref 1.8–7.7)
NEUTROPHILS NFR BLD: 40.3 % (ref 38–73)
NONHDLC SERPL-MCNC: 120 MG/DL
NRBC BLD-RTO: 0 /100 WBC
PLATELET # BLD AUTO: 356 K/UL (ref 150–450)
PMV BLD AUTO: 9.8 FL (ref 9.2–12.9)
POTASSIUM SERPL-SCNC: 5.1 MMOL/L (ref 3.5–5.1)
PROT SERPL-MCNC: 7.3 G/DL (ref 6–8.4)
RBC # BLD AUTO: 4.29 M/UL (ref 4.6–6.2)
SODIUM SERPL-SCNC: 139 MMOL/L (ref 136–145)
T4 FREE SERPL-MCNC: 0.83 NG/DL (ref 0.71–1.51)
TRIGL SERPL-MCNC: 110 MG/DL (ref 30–150)
TSH SERPL DL<=0.005 MIU/L-ACNC: 1.93 UIU/ML (ref 0.4–4)
WBC # BLD AUTO: 6.21 K/UL (ref 3.9–12.7)

## 2022-08-15 PROCEDURE — 3288F FALL RISK ASSESSMENT DOCD: CPT | Mod: CPTII,S$GLB,, | Performed by: FAMILY MEDICINE

## 2022-08-15 PROCEDURE — 83036 HEMOGLOBIN GLYCOSYLATED A1C: CPT | Performed by: FAMILY MEDICINE

## 2022-08-15 PROCEDURE — 85025 COMPLETE CBC W/AUTO DIFF WBC: CPT | Performed by: FAMILY MEDICINE

## 2022-08-15 PROCEDURE — 84439 ASSAY OF FREE THYROXINE: CPT | Performed by: FAMILY MEDICINE

## 2022-08-15 PROCEDURE — 36415 COLL VENOUS BLD VENIPUNCTURE: CPT | Mod: PO | Performed by: FAMILY MEDICINE

## 2022-08-15 PROCEDURE — 1125F PR PAIN SEVERITY QUANTIFIED, PAIN PRESENT: ICD-10-PCS | Mod: CPTII,S$GLB,, | Performed by: FAMILY MEDICINE

## 2022-08-15 PROCEDURE — 80061 LIPID PANEL: CPT | Performed by: FAMILY MEDICINE

## 2022-08-15 PROCEDURE — 3288F PR FALLS RISK ASSESSMENT DOCUMENTED: ICD-10-PCS | Mod: CPTII,S$GLB,, | Performed by: FAMILY MEDICINE

## 2022-08-15 PROCEDURE — 3008F BODY MASS INDEX DOCD: CPT | Mod: CPTII,S$GLB,, | Performed by: FAMILY MEDICINE

## 2022-08-15 PROCEDURE — 99999 PR PBB SHADOW E&M-EST. PATIENT-LVL IV: CPT | Mod: PBBFAC,,, | Performed by: FAMILY MEDICINE

## 2022-08-15 PROCEDURE — 3008F PR BODY MASS INDEX (BMI) DOCUMENTED: ICD-10-PCS | Mod: CPTII,S$GLB,, | Performed by: FAMILY MEDICINE

## 2022-08-15 PROCEDURE — 3078F DIAST BP <80 MM HG: CPT | Mod: CPTII,S$GLB,, | Performed by: FAMILY MEDICINE

## 2022-08-15 PROCEDURE — 1101F PT FALLS ASSESS-DOCD LE1/YR: CPT | Mod: CPTII,S$GLB,, | Performed by: FAMILY MEDICINE

## 2022-08-15 PROCEDURE — 99999 PR PBB SHADOW E&M-EST. PATIENT-LVL IV: ICD-10-PCS | Mod: PBBFAC,,, | Performed by: FAMILY MEDICINE

## 2022-08-15 PROCEDURE — 84153 ASSAY OF PSA TOTAL: CPT | Performed by: FAMILY MEDICINE

## 2022-08-15 PROCEDURE — 84443 ASSAY THYROID STIM HORMONE: CPT | Performed by: FAMILY MEDICINE

## 2022-08-15 PROCEDURE — 4010F ACE/ARB THERAPY RXD/TAKEN: CPT | Mod: CPTII,S$GLB,, | Performed by: FAMILY MEDICINE

## 2022-08-15 PROCEDURE — 99214 OFFICE O/P EST MOD 30 MIN: CPT | Mod: 25,S$GLB,, | Performed by: FAMILY MEDICINE

## 2022-08-15 PROCEDURE — 80053 COMPREHEN METABOLIC PANEL: CPT | Performed by: FAMILY MEDICINE

## 2022-08-15 PROCEDURE — 3078F PR MOST RECENT DIASTOLIC BLOOD PRESSURE < 80 MM HG: ICD-10-PCS | Mod: CPTII,S$GLB,, | Performed by: FAMILY MEDICINE

## 2022-08-15 PROCEDURE — 1159F MED LIST DOCD IN RCRD: CPT | Mod: CPTII,S$GLB,, | Performed by: FAMILY MEDICINE

## 2022-08-15 PROCEDURE — 4010F PR ACE/ARB THEARPY RXD/TAKEN: ICD-10-PCS | Mod: CPTII,S$GLB,, | Performed by: FAMILY MEDICINE

## 2022-08-15 PROCEDURE — 1101F PR PT FALLS ASSESS DOC 0-1 FALLS W/OUT INJ PAST YR: ICD-10-PCS | Mod: CPTII,S$GLB,, | Performed by: FAMILY MEDICINE

## 2022-08-15 PROCEDURE — 1159F PR MEDICATION LIST DOCUMENTED IN MEDICAL RECORD: ICD-10-PCS | Mod: CPTII,S$GLB,, | Performed by: FAMILY MEDICINE

## 2022-08-15 PROCEDURE — 3074F PR MOST RECENT SYSTOLIC BLOOD PRESSURE < 130 MM HG: ICD-10-PCS | Mod: CPTII,S$GLB,, | Performed by: FAMILY MEDICINE

## 2022-08-15 PROCEDURE — 1125F AMNT PAIN NOTED PAIN PRSNT: CPT | Mod: CPTII,S$GLB,, | Performed by: FAMILY MEDICINE

## 2022-08-15 PROCEDURE — 99397 PR PREVENTIVE VISIT,EST,65 & OVER: ICD-10-PCS | Mod: S$GLB,,, | Performed by: FAMILY MEDICINE

## 2022-08-15 PROCEDURE — 99397 PER PM REEVAL EST PAT 65+ YR: CPT | Mod: S$GLB,,, | Performed by: FAMILY MEDICINE

## 2022-08-15 PROCEDURE — 99214 PR OFFICE/OUTPT VISIT, EST, LEVL IV, 30-39 MIN: ICD-10-PCS | Mod: 25,S$GLB,, | Performed by: FAMILY MEDICINE

## 2022-08-15 PROCEDURE — 3074F SYST BP LT 130 MM HG: CPT | Mod: CPTII,S$GLB,, | Performed by: FAMILY MEDICINE

## 2022-08-15 RX ORDER — MELOXICAM 15 MG/1
15 TABLET ORAL DAILY
Qty: 90 TABLET | Refills: 3 | Status: SHIPPED | OUTPATIENT
Start: 2022-08-15 | End: 2022-08-15

## 2022-08-15 RX ORDER — DULOXETIN HYDROCHLORIDE 60 MG/1
60 CAPSULE, DELAYED RELEASE ORAL DAILY
Qty: 90 CAPSULE | Refills: 3 | Status: SHIPPED | OUTPATIENT
Start: 2022-08-15 | End: 2023-05-03 | Stop reason: SDUPTHER

## 2022-08-15 NOTE — PROGRESS NOTES
[facility]  Progress Note    SUBJECTIVE:     Chief Complaint   Patient presents with    [facility]    Annual Exam       HPI   Bryan Queen  is a 71 y.o. male  Here to [facility] and for annual exam. Also has other issues which will be addressed below. Patient has no other new complaints/problems at this time.      Review of patient's allergies indicates:   Allergen Reactions    Hydroxyzine Nausea And Vomiting and Other (See Comments)     SWEATING    Lyrica [pregabalin] Rash    Tramadol Nausea And Vomiting     Nauseous,Sweating, Ringing in the ears    Vicodin  [hydrocodone-acetaminophen]      Other reaction(s): Rash       Past Medical History:   Diagnosis Date    Arthritis     BPH (benign prostatic hypertrophy)     Cancer     prostate    Colon polyps     Elevated PSA     Groin abscess     Hypertension     Joint pain     Lumbar radiculopathy     Obstructive sleep apnea (adult) (pediatric)     CPAP hs    PONV (postoperative nausea and vomiting)     Prostate cancer     Spinal stenosis      Past Surgical History:   Procedure Laterality Date    CERVICAL FUSION  1/27/2009    C3-4 fusion    COLONOSCOPY N/A 9/13/2016    Procedure: COLONOSCOPY;  Surgeon: ROWENA Ahn MD;  Location: [address]);  Service: Endoscopy;  Laterality: N/A;  Patient requested the week of 9/12. Patient reports PONV.    KNEE SURGERY Left 4-13-15    TK    KNEE SURGERY Right 8-19-15    TK    LUMBAR DISCECTOMY  12/3/2009    L5-S1 microdiscex    PROSTATECTOMY  08/16/2017    SPINE SURGERY  04/01/13    L4/5 laminectomy    TONSILLECTOMY       Family History   Problem Relation Age of Onset    Diabetes Mother     Hypertension Mother     Diabetes Sister     Diabetes Brother     Melanoma Neg Hx      Social History     Tobacco Use    Smoking status: Never Smoker    Smokeless tobacco: Never Used   Substance Use Topics    Alcohol use: Yes     Comment: occasional    Drug use: No        Review of  Systems   Constitutional: Negative for chills, diaphoresis and fever.   HENT: Positive for hearing loss. Negative for congestion, ear pain and sore throat.    Eyes: Negative for photophobia and discharge.   Respiratory: Negative for cough, shortness of breath and wheezing.    Cardiovascular: Negative for chest pain and palpitations.   Gastrointestinal: Negative for abdominal pain, constipation, diarrhea, nausea and vomiting.   Genitourinary: Negative for dysuria and hematuria.   Musculoskeletal: Positive for back pain and joint pain. Negative for myalgias.   Skin: Negative for itching and rash.   Neurological: Negative for dizziness, sensory change, focal weakness, weakness and headaches.   All other systems reviewed and are negative.    OBJECTIVE:     Vitals:    08/15/22 0930   BP: 108/64   Pulse: 82   Temp: 98.6 °F (37 °C)     Body mass index is 41.01 kg/m².    Physical Exam  Constitutional:       General: He is not in acute distress.     Appearance: He is not diaphoretic.   HENT:      Head: Normocephalic and atraumatic.      Right Ear: Tympanic membrane and ear canal normal. No hemotympanum. Tympanic membrane is not perforated, erythematous or bulging.      Left Ear: Tympanic membrane and ear canal normal. No hemotympanum. Tympanic membrane is not perforated, erythematous or bulging.      Mouth/Throat:      Pharynx: No oropharyngeal exudate.   Eyes:      Conjunctiva/sclera: Conjunctivae normal.      Pupils: Pupils are equal, round, and reactive to light.   Neck:      Thyroid: No thyromegaly.   Cardiovascular:      Rate and Rhythm: Normal rate and regular rhythm.      Heart sounds: Normal heart sounds. No murmur heard.    No friction rub. No gallop.   Pulmonary:      Effort: Pulmonary effort is normal. No respiratory distress.      Breath sounds: Normal breath sounds. No wheezing or rales.   Abdominal:      General: Bowel sounds are normal. There is no distension.      Palpations: Abdomen is soft.      Tenderness:  There is no abdominal tenderness. There is no guarding or rebound.   Musculoskeletal:         General: No tenderness. Normal range of motion.   Lymphadenopathy:      Cervical: No cervical adenopathy.   Skin:     General: Skin is warm.      Findings: No erythema or rash.   Neurological:      Mental Status: He is alert and oriented to person, place, and time.         Old records were reviewed. Labs and/or images were independently reviewed.    ASSESSMENT     1. Routine physical examination    2. Osteoarthritis of spine with radiculopathy, lumbar region    3. Essential hypertension    4. Cervical myelopathy with cervical radiculopathy    5. Primary osteoarthritis of both knees    6. Encounter for screening for malignant neoplasm of prostate     7. Stage 3 chronic kidney disease, unspecified whether stage 3a or 3b CKD    8. BMI 40.0-44.9, adult    9. Encounter for screening for malignant neoplasm of colon    10. Hearing loss, unspecified hearing loss type, unspecified laterality    11. Gait instability    12. Erectile dysfunction, unspecified erectile dysfunction type        PLAN:     Routine physical examination  -     CBC Auto Differential; Future  -     Comprehensive Metabolic Panel; Future  -     Hemoglobin A1C; Future  -     Lipid Panel; Future  -     TSH; Future  -     T4, Free; Future  -     PSA, Screening; Future; Expected date: 08/15/2022  -     We briefly discussed diet, exercise, and routine preventive exams. All questions and comments addressed.    Osteoarthritis of spine with radiculopathy, lumbar region  -     DULoxetine (CYMBALTA) 60 MG capsule; Take 1 capsule (60 mg total) by mouth once daily.  Dispense: 90 capsule; Refill: 3  -     Ambulatory referral/consult to Physical/Occupational Therapy; Future; Expected date: 08/22/2022    Essential hypertension  -     CBC Auto Differential; Future  -     Comprehensive Metabolic Panel; Future  -     Hemoglobin A1C; Future  -     Lipid Panel; Future  -     TSH;  Future  -     T4, Free; Future  -     PSA, Screening; Future; Expected date: 08/15/2022  -     Stable. Continue current regimen.      RTC KATELYN Li MD  08/15/2022 9:58 AM    Additional Evaluation & Management issues:    In addition to today's Annual Physical, patient has other medical issues that need to be addressed, as well as their associated prescription management that is separate from today's physical, as documented separately below.     HPI  Pt here for follow-up of his chronic conditions. Has chronic pain which he takes cymbalta and meloxicam which helps. Also has c/o having some hearing loss which he has the TV turned to the max. Wife reports he has hard of hearing. Also having some issues with balance/gait instability. Feels his legs are weak and and can fall.    Review of Systems   Constitutional: Negative for chills, diaphoresis and fever.   HENT: Positive for hearing loss. Negative for congestion, ear pain and sore throat.    Eyes: Negative for photophobia and discharge.   Respiratory: Negative for cough, shortness of breath and wheezing.    Cardiovascular: Negative for chest pain and palpitations.   Gastrointestinal: Negative for abdominal pain, constipation, diarrhea, nausea and vomiting.   Genitourinary: Negative for dysuria and hematuria.   Musculoskeletal: Positive for back pain and joint pain. Negative for myalgias.   Skin: Negative for itching and rash.   Neurological: Negative for dizziness, sensory change, focal weakness, weakness and headaches.   All other systems reviewed and are negative.    Physical Exam  Constitutional:       General: He is not in acute distress.     Appearance: He is not diaphoretic.   HENT:      Head: Normocephalic and atraumatic.      Right Ear: Tympanic membrane and ear canal normal. No hemotympanum. Tympanic membrane is not perforated, erythematous or bulging.      Left Ear: Tympanic membrane and ear canal normal. No hemotympanum. Tympanic membrane is not  perforated, erythematous or bulging.      Mouth/Throat:      Pharynx: No oropharyngeal exudate.   Eyes:      Conjunctiva/sclera: Conjunctivae normal.      Pupils: Pupils are equal, round, and reactive to light.   Neck:      Thyroid: No thyromegaly.   Cardiovascular:      Rate and Rhythm: Normal rate and regular rhythm.      Heart sounds: Normal heart sounds. No murmur heard.    No friction rub. No gallop.   Pulmonary:      Effort: Pulmonary effort is normal. No respiratory distress.      Breath sounds: Normal breath sounds. No wheezing or rales.   Abdominal:      General: Bowel sounds are normal. There is no distension.      Palpations: Abdomen is soft.      Tenderness: There is no abdominal tenderness. There is no guarding or rebound.   Musculoskeletal:         General: No tenderness. Normal range of motion.   Lymphadenopathy:      Cervical: No cervical adenopathy.   Skin:     General: Skin is warm.      Findings: No erythema or rash.   Neurological:      Mental Status: He is alert and oriented to person, place, and time.     Cervical myelopathy with cervical radiculopathy  -     DULoxetine (CYMBALTA) 60 MG capsule; Take 1 capsule (60 mg total) by mouth once daily.  Dispense: 90 capsule; Refill: 3  -     Stable. Continue current regimen.    Primary osteoarthritis of both knees        -     Stop meloxicam due to CKD. Tylenol PRN for now.    Encounter for screening for malignant neoplasm of prostate   -     PSA, Screening; Future; Expected date: 08/15/2022    Stage 3 chronic kidney disease, unspecified whether stage 3a or 3b CKD   -     Stay hydrated. No NSAIDs due to CKD.    BMI 40.0-44.9, adult   -     Counseled patient about healthy diet, exercise habits, and to increase physical activity.    Encounter for screening for malignant neoplasm of colon  -     Ambulatory referral/consult to Endo Procedure ; Future; Expected date: 08/16/2022    Hearing loss, unspecified hearing loss type, unspecified  laterality  -     Ambulatory referral/consult to Audiology; Future; Expected date: 08/22/2022    Gait instability  -     Ambulatory referral/consult to Physical/Occupational Therapy; Future; Expected date: 08/22/2022    Erectile dysfunction, unspecified erectile dysfunction type  -     Ambulatory referral/consult to Urology; Future; Expected date: 08/22/2022    RTC PRN.

## 2022-08-17 ENCOUNTER — TELEPHONE (OUTPATIENT)
Dept: FAMILY MEDICINE | Facility: CLINIC | Age: 72
End: 2022-08-17
Payer: MEDICARE

## 2022-09-26 ENCOUNTER — TELEPHONE (OUTPATIENT)
Dept: FAMILY MEDICINE | Facility: CLINIC | Age: 72
End: 2022-09-26
Payer: MEDICARE

## 2022-09-26 DIAGNOSIS — T78.40XA ALLERGY, INITIAL ENCOUNTER: Primary | ICD-10-CM

## 2022-09-26 NOTE — TELEPHONE ENCOUNTER
----- Message from Jacques Dodd sent at 9/26/2022 10:56 AM CDT -----  Type: Patient Call Back    Who called:Wife/ Terri    What is the request in detail: Calling to speak with a nurse regarding darrian today. Pt's wife states that pt was to be scheduled with an allergist.     Can the clinic reply by [facility]? no    Would the patient rather a call back or a response via [facility]? Call back    Best call back number: [phone] (home)       Additional Information:

## 2022-09-26 NOTE — TELEPHONE ENCOUNTER
Spoke with patient's spouse and she stated that she does not understand why the patient has an appointment today with you. Patient was seen for this before and a referral was supposed to be placed for him to go to Allergy. Patient will cancel appointment and wait to see Allergist.

## 2022-09-29 ENCOUNTER — OFFICE VISIT (OUTPATIENT)
Dept: UROLOGY | Facility: CLINIC | Age: 72
End: 2022-09-29
Payer: MEDICARE

## 2022-09-29 VITALS — HEIGHT: 68 IN | WEIGHT: 270.31 LBS | BODY MASS INDEX: 40.97 KG/M2

## 2022-09-29 DIAGNOSIS — Z85.46 HISTORY OF PROSTATE CANCER: ICD-10-CM

## 2022-09-29 DIAGNOSIS — N52.9 ERECTILE DYSFUNCTION, UNSPECIFIED ERECTILE DYSFUNCTION TYPE: Primary | ICD-10-CM

## 2022-09-29 DIAGNOSIS — N39.3 SUI (STRESS URINARY INCONTINENCE), MALE: ICD-10-CM

## 2022-09-29 PROCEDURE — 1125F AMNT PAIN NOTED PAIN PRSNT: CPT | Mod: CPTII,S$GLB,, | Performed by: UROLOGY

## 2022-09-29 PROCEDURE — 3008F PR BODY MASS INDEX (BMI) DOCUMENTED: ICD-10-PCS | Mod: CPTII,S$GLB,, | Performed by: UROLOGY

## 2022-09-29 PROCEDURE — 99204 PR OFFICE/OUTPT VISIT, NEW, LEVL IV, 45-59 MIN: ICD-10-PCS | Mod: S$GLB,,, | Performed by: UROLOGY

## 2022-09-29 PROCEDURE — 1159F PR MEDICATION LIST DOCUMENTED IN MEDICAL RECORD: ICD-10-PCS | Mod: CPTII,S$GLB,, | Performed by: UROLOGY

## 2022-09-29 PROCEDURE — 1160F RVW MEDS BY RX/DR IN RCRD: CPT | Mod: CPTII,S$GLB,, | Performed by: UROLOGY

## 2022-09-29 PROCEDURE — 99999 PR PBB SHADOW E&M-EST. PATIENT-LVL IV: CPT | Mod: PBBFAC,,, | Performed by: UROLOGY

## 2022-09-29 PROCEDURE — 4010F ACE/ARB THERAPY RXD/TAKEN: CPT | Mod: CPTII,S$GLB,, | Performed by: UROLOGY

## 2022-09-29 PROCEDURE — 4010F PR ACE/ARB THEARPY RXD/TAKEN: ICD-10-PCS | Mod: CPTII,S$GLB,, | Performed by: UROLOGY

## 2022-09-29 PROCEDURE — 3008F BODY MASS INDEX DOCD: CPT | Mod: CPTII,S$GLB,, | Performed by: UROLOGY

## 2022-09-29 PROCEDURE — 1101F PT FALLS ASSESS-DOCD LE1/YR: CPT | Mod: CPTII,S$GLB,, | Performed by: UROLOGY

## 2022-09-29 PROCEDURE — 3288F FALL RISK ASSESSMENT DOCD: CPT | Mod: CPTII,S$GLB,, | Performed by: UROLOGY

## 2022-09-29 PROCEDURE — 1125F PR PAIN SEVERITY QUANTIFIED, PAIN PRESENT: ICD-10-PCS | Mod: CPTII,S$GLB,, | Performed by: UROLOGY

## 2022-09-29 PROCEDURE — 1160F PR REVIEW ALL MEDS BY PRESCRIBER/CLIN PHARMACIST DOCUMENTED: ICD-10-PCS | Mod: CPTII,S$GLB,, | Performed by: UROLOGY

## 2022-09-29 PROCEDURE — 1159F MED LIST DOCD IN RCRD: CPT | Mod: CPTII,S$GLB,, | Performed by: UROLOGY

## 2022-09-29 PROCEDURE — 1101F PR PT FALLS ASSESS DOC 0-1 FALLS W/OUT INJ PAST YR: ICD-10-PCS | Mod: CPTII,S$GLB,, | Performed by: UROLOGY

## 2022-09-29 PROCEDURE — 3044F HG A1C LEVEL LT 7.0%: CPT | Mod: CPTII,S$GLB,, | Performed by: UROLOGY

## 2022-09-29 PROCEDURE — 99999 PR PBB SHADOW E&M-EST. PATIENT-LVL IV: ICD-10-PCS | Mod: PBBFAC,,, | Performed by: UROLOGY

## 2022-09-29 PROCEDURE — 99204 OFFICE O/P NEW MOD 45 MIN: CPT | Mod: S$GLB,,, | Performed by: UROLOGY

## 2022-09-29 PROCEDURE — 3044F PR MOST RECENT HEMOGLOBIN A1C LEVEL <7.0%: ICD-10-PCS | Mod: CPTII,S$GLB,, | Performed by: UROLOGY

## 2022-09-29 PROCEDURE — 3288F PR FALLS RISK ASSESSMENT DOCUMENTED: ICD-10-PCS | Mod: CPTII,S$GLB,, | Performed by: UROLOGY

## 2022-09-29 RX ORDER — TADALAFIL 20 MG/1
20 TABLET ORAL DAILY PRN
Qty: 10 TABLET | Refills: 11 | Status: SHIPPED | OUTPATIENT
Start: 2022-09-29 | End: 2023-09-05

## 2022-09-29 NOTE — PROGRESS NOTES
Subjective:       Patient ID: Bryan Queen is a 72 y.o. male who was referred by Jcarlos Li MD    Chief Complaint:   Chief Complaint   Patient presents with    Erectile Dysfunction       Penile Length/ED  He has had issues for the past 2 years with penile length.  He has difficulty with spraying of his steam.  He has to pull his pants down completely to get to his penis.  He has noted some weight gain over the past 2 years.  It is becoming a problem.  He has some SIXTO after his RALP.  He wears about 1 pad per day.    He had a Robotic Assisted Laparoscopic Prostatectomy in August 2017 by Dr. Evans.    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    Status post total right knee replacement 8/19/2015    Essential hypertension    Morbid obesity due to excess calories    History of prostate cancer    Difficulty walking    Stiffness of ankle joint, right    Chronic pain of right ankle    Personal history of other endocrine, nutritional and metabolic disease    LENNIE (obstructive sleep apnea)    Stage 3 chronic kidney disease, unspecified whether stage 3a or 3b CKD       PAST MEDICAL HISTORY  Past Medical History:   Diagnosis Date    Arthritis     BPH (benign prostatic hypertrophy)     Cancer     prostate    Colon polyps     Elevated PSA     Groin abscess     Hypertension     Joint pain     Lumbar radiculopathy     Obstructive sleep apnea (adult) (pediatric)     CPAP hs    PONV (postoperative nausea and vomiting)     Prostate cancer     Spinal stenosis        PAST SURGICAL HISTORY:  Past Surgical History:   Procedure Laterality Date     CERVICAL FUSION  1/27/2009    C3-4 fusion    COLONOSCOPY N/A 9/13/2016    Procedure: COLONOSCOPY;  Surgeon: ROWENA Ahn MD;  Location: [facility] ([address] [place] [address]);  Service: Endoscopy;  Laterality: N/A;  Patient requested the week of 9/12. Patient reports PONV.    KNEE SURGERY Left 4-13-15    TK    KNEE SURGERY Right 8-19-15    TK    LUMBAR DISCECTOMY  12/3/2009    L5-S1 microdiscex    PROSTATECTOMY  08/16/2017    SPINE SURGERY  04/01/13    L4/5 laminectomy    TONSILLECTOMY         SOCIAL HISTORY:  Social History     Tobacco Use    Smoking status: Never    Smokeless tobacco: Never   Substance Use Topics    Alcohol use: Yes     Comment: occasional    Drug use: No       FAMILY HISTORY:  Family History   Problem Relation Age of Onset    Diabetes Mother     Hypertension Mother     Diabetes Sister     Diabetes Brother     Melanoma Neg Hx        ALLERGIES AND MEDICATIONS: updated and reviewed.  Review of patient's allergies indicates:   Allergen Reactions    Hydroxyzine Nausea And Vomiting and Other (See Comments)     SWEATING    Lyrica [pregabalin] Rash    Tramadol Nausea And Vomiting     Nauseous,Sweating, Ringing in the ears    Vicodin  [hydrocodone-acetaminophen]      Other reaction(s): Rash     Current Outpatient Medications   Medication Sig    amLODIPine (NORVASC) 5 MG tablet TAKE 1 TABLET ONE TIME DAILY    DULoxetine (CYMBALTA) 60 MG capsule Take 1 capsule (60 mg total) by mouth once daily.    fluocinonide (LIDEX) 0.05 % ointment Apply topically daily as needed.    gabapentin (NEURONTIN) 600 MG tablet Take 1 tablet (600 mg total) by mouth 3 (three) times daily.    ketorolac 0.5% (ACULAR) 0.5 % Drop INSTILL 1 DROP INTO RIGHT EYE FOR UP TO FOUR TIMES A DAY AS NEEDED FOR IRRITATION    olmesartan (BENICAR) 20 MG tablet TAKE 1 TABLET EVERY DAY    OMEGA 3,6,9 COMBINATION NO.7 (OMEGA DHA ORAL) Take 1 capsule by mouth 2 (two) times daily.    tadalafiL (CIALIS) 20 MG Tab Take 1 tablet (20 mg total) by mouth daily as needed  "(erectile dysfunction).     No current facility-administered medications for this visit.       Review of Systems   Constitutional:  Negative for activity change, fatigue, fever and unexpected weight change.   HENT:  Negative for congestion.    Eyes:  Negative for redness.   Respiratory:  Negative for chest tightness and shortness of breath.    Cardiovascular:  Negative for chest pain and leg swelling.   Gastrointestinal:  Negative for abdominal pain, constipation, diarrhea, nausea and vomiting.   Genitourinary:  Negative for dysuria, flank pain, frequency, hematuria, penile pain, penile swelling, scrotal swelling, testicular pain and urgency.   Musculoskeletal:  Negative for arthralgias and back pain.   Neurological:  Negative for dizziness and light-headedness.   Psychiatric/Behavioral:  Negative for behavioral problems and confusion. The patient is not nervous/anxious.    All other systems reviewed and are negative.    Objective:      Vitals:    09/29/22 1046   Weight: 122.6 kg (270 lb 4.5 oz)   Height: 5' 8" (1.727 m)     Physical Exam  Vitals and nursing note reviewed.   Constitutional:       Appearance: He is well-developed.   HENT:      Head: Normocephalic.   Eyes:      Conjunctiva/sclera: Conjunctivae normal.   Neck:      Thyroid: No thyromegaly.      Trachea: No tracheal deviation.   Cardiovascular:      Rate and Rhythm: Normal rate.      Heart sounds: Normal heart sounds.   Pulmonary:      Effort: Pulmonary effort is normal. No respiratory distress.      Breath sounds: Normal breath sounds. No wheezing.   Abdominal:      General: Bowel sounds are normal.      Palpations: Abdomen is soft.      Tenderness: There is no abdominal tenderness. There is no rebound.      Hernia: No hernia is present.   Genitourinary:     Penis: Normal and circumcised.       Testes: Normal.      Comments: Significant mons pubis  Musculoskeletal:         General: No tenderness. Normal range of motion.      Cervical back: Normal range " of motion and neck supple.   Lymphadenopathy:      Cervical: No cervical adenopathy.   Skin:     General: Skin is warm and dry.      Findings: No erythema or rash.   Neurological:      Mental Status: He is alert and oriented to person, place, and time.   Psychiatric:         Behavior: Behavior normal.         Thought Content: Thought content normal.         Judgment: Judgment normal.       Urine dipstick shows negative for all components.  Micro exam: negative for WBC's or RBC's.    Component Ref Range & Units 1 mo ago 3 yr ago 5 yr ago 9 yr ago 11 yr ago 12 yr ago 14 yr ago   PSA, Screen 0.00 - 4.00 ng/mL <0.01  <0.01 CM  4.6 High  CM  1.29 R, CM  1.1 R, CM  1.2 R, CM  1.0 R    Comment: The testing method is a chemiluminescent microparticle immunoassay   manufactured by [employer] and performed on the True [employer]   or   [employer] system. Values obtained with different assay manufacturers   for   methods may be different and cannot be used interchangeably.   PSA Expected levels:   Hormonal Therapy: <0.05 ng/ml   Prostatectomy: <0.01 ng/ml   Radiation Therapy: <1.00 ng/ml    Resulting Agency  [facility]              Specimen Collected: 08/15/22 10:32 Last Resulted: 08/15/22 18:12             Assessment:       1. Erectile dysfunction, unspecified erectile dysfunction type    2. SIXTO (stress urinary incontinence), male    3. History of prostate cancer          Plan:       1. Erectile dysfunction, unspecified erectile dysfunction type  We talked about penile length.  He had a prostatectomy which may affect length, but more importantly he is over weight.  He needs to loose weight to minimize his mons pubis.  This will help him get length.    - Ambulatory referral/consult to Urology  - tadalafiL (CIALIS) 20 MG Tab; Take 1 tablet (20 mg total) by mouth daily as needed (erectile dysfunction).  Dispense: 10 tablet; Refill: 11    2. SIXTO (stress urinary incontinence), male  Gabi  exercises    3. History of prostate cancer  stable          Follow up in about 6 months (around 3/29/2023) for Follow up.

## 2022-11-14 NOTE — TELEPHONE ENCOUNTER
----- Message from Mary Ramirez sent at 2/1/2022 12:02 PM CST -----  Who Called:        Refill or New Rx: refill         RX Name and Strength:   amLODIPine (NORVASC) 5 MG tablet   / meloxicam (MOBIC) 15 MG tablet  / olmesartan (BENICAR) 20 MG tablet        How is the patient currently taking it? (ex. 1XDay)        Is this a 30 day or 90 day RX        Preferred Pharmacy with phone number:  [facility] [id number] [facility], [place] - [address] [facility] [facility]        Local or Mail Order: local         Ordering Provider:        Would the patient rather a call back or a response via [facility]? Call back         Best Call Back Number:   [phone]        Additional Information:    
Advised patient's wife Mrs. Murray that one year supply of  these medication were sent on 11/11/2021 to start 01/10/2022. Have them to check his profile.   
2 = assistive person

## 2022-12-13 ENCOUNTER — OFFICE VISIT (OUTPATIENT)
Dept: ALLERGY | Facility: CLINIC | Age: 72
End: 2022-12-13
Payer: MEDICARE

## 2022-12-13 ENCOUNTER — LAB VISIT (OUTPATIENT)
Dept: LAB | Facility: HOSPITAL | Age: 72
End: 2022-12-13
Attending: STUDENT IN AN ORGANIZED HEALTH CARE EDUCATION/TRAINING PROGRAM
Payer: MEDICARE

## 2022-12-13 VITALS
DIASTOLIC BLOOD PRESSURE: 68 MMHG | BODY MASS INDEX: 44.22 KG/M2 | SYSTOLIC BLOOD PRESSURE: 132 MMHG | HEIGHT: 65 IN | WEIGHT: 265.44 LBS

## 2022-12-13 DIAGNOSIS — J31.0 RHINITIS, UNSPECIFIED TYPE: ICD-10-CM

## 2022-12-13 DIAGNOSIS — R09.89 THROAT CLEARING: ICD-10-CM

## 2022-12-13 DIAGNOSIS — H57.9 OCULAR PRURITUS: ICD-10-CM

## 2022-12-13 DIAGNOSIS — J31.0 RHINITIS, UNSPECIFIED TYPE: Primary | ICD-10-CM

## 2022-12-13 PROCEDURE — 4010F ACE/ARB THERAPY RXD/TAKEN: CPT | Mod: CPTII,S$GLB,, | Performed by: STUDENT IN AN ORGANIZED HEALTH CARE EDUCATION/TRAINING PROGRAM

## 2022-12-13 PROCEDURE — 86003 ALLG SPEC IGE CRUDE XTRC EA: CPT | Mod: 59 | Performed by: STUDENT IN AN ORGANIZED HEALTH CARE EDUCATION/TRAINING PROGRAM

## 2022-12-13 PROCEDURE — 99204 PR OFFICE/OUTPT VISIT, NEW, LEVL IV, 45-59 MIN: ICD-10-PCS | Mod: S$GLB,,, | Performed by: STUDENT IN AN ORGANIZED HEALTH CARE EDUCATION/TRAINING PROGRAM

## 2022-12-13 PROCEDURE — 3078F PR MOST RECENT DIASTOLIC BLOOD PRESSURE < 80 MM HG: ICD-10-PCS | Mod: CPTII,S$GLB,, | Performed by: STUDENT IN AN ORGANIZED HEALTH CARE EDUCATION/TRAINING PROGRAM

## 2022-12-13 PROCEDURE — 3044F HG A1C LEVEL LT 7.0%: CPT | Mod: CPTII,S$GLB,, | Performed by: STUDENT IN AN ORGANIZED HEALTH CARE EDUCATION/TRAINING PROGRAM

## 2022-12-13 PROCEDURE — 99499 RISK ADDL DX/OHS AUDIT: ICD-10-PCS | Mod: S$GLB,,, | Performed by: STUDENT IN AN ORGANIZED HEALTH CARE EDUCATION/TRAINING PROGRAM

## 2022-12-13 PROCEDURE — 3044F PR MOST RECENT HEMOGLOBIN A1C LEVEL <7.0%: ICD-10-PCS | Mod: CPTII,S$GLB,, | Performed by: STUDENT IN AN ORGANIZED HEALTH CARE EDUCATION/TRAINING PROGRAM

## 2022-12-13 PROCEDURE — 3008F BODY MASS INDEX DOCD: CPT | Mod: CPTII,S$GLB,, | Performed by: STUDENT IN AN ORGANIZED HEALTH CARE EDUCATION/TRAINING PROGRAM

## 2022-12-13 PROCEDURE — 1160F RVW MEDS BY RX/DR IN RCRD: CPT | Mod: CPTII,S$GLB,, | Performed by: STUDENT IN AN ORGANIZED HEALTH CARE EDUCATION/TRAINING PROGRAM

## 2022-12-13 PROCEDURE — 1160F PR REVIEW ALL MEDS BY PRESCRIBER/CLIN PHARMACIST DOCUMENTED: ICD-10-PCS | Mod: CPTII,S$GLB,, | Performed by: STUDENT IN AN ORGANIZED HEALTH CARE EDUCATION/TRAINING PROGRAM

## 2022-12-13 PROCEDURE — 99204 OFFICE O/P NEW MOD 45 MIN: CPT | Mod: S$GLB,,, | Performed by: STUDENT IN AN ORGANIZED HEALTH CARE EDUCATION/TRAINING PROGRAM

## 2022-12-13 PROCEDURE — 36415 COLL VENOUS BLD VENIPUNCTURE: CPT | Mod: PO | Performed by: STUDENT IN AN ORGANIZED HEALTH CARE EDUCATION/TRAINING PROGRAM

## 2022-12-13 PROCEDURE — 86003 ALLG SPEC IGE CRUDE XTRC EA: CPT | Performed by: STUDENT IN AN ORGANIZED HEALTH CARE EDUCATION/TRAINING PROGRAM

## 2022-12-13 PROCEDURE — 1159F MED LIST DOCD IN RCRD: CPT | Mod: CPTII,S$GLB,, | Performed by: STUDENT IN AN ORGANIZED HEALTH CARE EDUCATION/TRAINING PROGRAM

## 2022-12-13 PROCEDURE — 3078F DIAST BP <80 MM HG: CPT | Mod: CPTII,S$GLB,, | Performed by: STUDENT IN AN ORGANIZED HEALTH CARE EDUCATION/TRAINING PROGRAM

## 2022-12-13 PROCEDURE — 99999 PR PBB SHADOW E&M-EST. PATIENT-LVL III: ICD-10-PCS | Mod: PBBFAC,,, | Performed by: STUDENT IN AN ORGANIZED HEALTH CARE EDUCATION/TRAINING PROGRAM

## 2022-12-13 PROCEDURE — 3075F PR MOST RECENT SYSTOLIC BLOOD PRESS GE 130-139MM HG: ICD-10-PCS | Mod: CPTII,S$GLB,, | Performed by: STUDENT IN AN ORGANIZED HEALTH CARE EDUCATION/TRAINING PROGRAM

## 2022-12-13 PROCEDURE — 99999 PR PBB SHADOW E&M-EST. PATIENT-LVL III: CPT | Mod: PBBFAC,,, | Performed by: STUDENT IN AN ORGANIZED HEALTH CARE EDUCATION/TRAINING PROGRAM

## 2022-12-13 PROCEDURE — 3075F SYST BP GE 130 - 139MM HG: CPT | Mod: CPTII,S$GLB,, | Performed by: STUDENT IN AN ORGANIZED HEALTH CARE EDUCATION/TRAINING PROGRAM

## 2022-12-13 PROCEDURE — 3008F PR BODY MASS INDEX (BMI) DOCUMENTED: ICD-10-PCS | Mod: CPTII,S$GLB,, | Performed by: STUDENT IN AN ORGANIZED HEALTH CARE EDUCATION/TRAINING PROGRAM

## 2022-12-13 PROCEDURE — 4010F PR ACE/ARB THEARPY RXD/TAKEN: ICD-10-PCS | Mod: CPTII,S$GLB,, | Performed by: STUDENT IN AN ORGANIZED HEALTH CARE EDUCATION/TRAINING PROGRAM

## 2022-12-13 PROCEDURE — 1159F PR MEDICATION LIST DOCUMENTED IN MEDICAL RECORD: ICD-10-PCS | Mod: CPTII,S$GLB,, | Performed by: STUDENT IN AN ORGANIZED HEALTH CARE EDUCATION/TRAINING PROGRAM

## 2022-12-13 PROCEDURE — 99499 UNLISTED E&M SERVICE: CPT | Mod: S$GLB,,, | Performed by: STUDENT IN AN ORGANIZED HEALTH CARE EDUCATION/TRAINING PROGRAM

## 2022-12-13 RX ORDER — MELOXICAM 15 MG/1
TABLET ORAL
COMMUNITY
Start: 2022-08-15

## 2022-12-13 NOTE — PATIENT INSTRUCTIONS
For your throat clearing which sounds like it might be due to post nasal drip:  I recommend starting flonase (fluticasone nasal spray) 1 to 2 sprays in each nostril twice daily.  I also recommend starting a daily or nightly zyrtec (cetirizine) 10 mg.    These medications might also help with the eye symptoms.  You can purchase these medications over the counter.    The allergy test results usually come in a week or less. You will be able to see your results in the [facility] portal. I will also send a message through the [facility] portal summarizing the results.

## 2022-12-13 NOTE — PROGRESS NOTES
ALLERGY & IMMUNOLOGY CLINIC - INITIAL CONSULTATION      HISTORY OF PRESENT ILLNESS     Patient ID: Bryan Queen is a 72 y.o. male    CC: rhinitis, ocular symptoms, requesting evaluation for mold allergy    HPI: Bryan Queen is a 72 y.o. male with HTN and LENNIE presenting for rhinitis and oculars symptoms, previously with mold in home.  He was referred by Jcarlos Li MD.    He says he is interested in finding out if he has a mold allergy for insurance purposes. They had mold remediation done and they had found mold. He says the mold was due to Renetta and repairs have since been made. He says when his daughter stayed in their home, she got an itchy throat. It was worse upstairs. He says the house has been clear of mold for 3-4 months, but he still has some symptoms.    His symptoms started in early 2022.  He says he finds himself clearing his throat a lot. He is unsure what makes him clear his throat. He doesn't notice post nasal drip, but sometimes he feels there is mucus in his throat so he thinks he does get post nasal drip.   Patient endorses cough (rare), ocular pruritus. He also gets watery eyes.  Patient denies congestion, sneezing, rhinorrhea, nasal pruritus.  Symptoms are perennial.   Symptoms occur every day.  There is no noticeable difference between indoors and outdoors.   The patient has not identified any triggers, besides possibly mold.  The patient denies heartburn/reflux symptoms.  The patient denies anosmia.   He has not tried antihistamines or nasal sprays. He has not tried any allergy eye drops. When asked if he is interested in trying medications, he says he would like a recommend on something to help with the throat clearing.     REVIEW OF SYSTEMS     CONST: no F/C/NS, no unexplained weight changes  PULM: no SOB, no wheezing  GI: no pain, no N/V/D  DERM: no rashes, no skin breaks     MEDICAL HISTORY     Vaccines:    Immunization History   Administered Date(s) Administered    COVID-19, MRNA,  LN-S, PF (MODERNA FULL 0.5 ML DOSE) 07/16/2022    COVID-19, vector-nr, rS-Ad26, PF (June) 03/14/2021    Influenza (FLUAD) - Quadrivalent - Adjuvanted - PF *Preferred* (65+) 12/11/2020    Influenza - High Dose - PF (65 years and older) 01/14/2016, 01/29/2018, 01/11/2019    Influenza - Intradermal - Quadrivalent - PF 10/09/2012, 10/29/2014    Influenza - Intradermal - Trivalent - PF 10/09/2012, 10/29/2014    Influenza Split 10/09/2012, 10/29/2014    Pneumococcal Conjugate - 13 Valent 01/29/2018    Pneumococcal Polysaccharide - 23 Valent 02/11/2019    Tdap 05/23/2019     Medical Hx:   Patient Active Problem List   Diagnosis    Cervical myelopathy with cervical radiculopathy    Gait disorder    DJD (degenerative joint disease) of knee    Colon polyps    Herpes    Anemia    DDD (degenerative disc disease), lumbar    Osteoarthritis of spine with radiculopathy, lumbar region    Lumbosacral neuritis    Primary osteoarthritis of both knees    Status post total left knee replacement 4/13/2015    Status post total right knee replacement 8/19/2015    Essential hypertension    Morbid obesity due to excess calories    History of prostate cancer    Difficulty walking    Stiffness of ankle joint, right    Chronic pain of right ankle    Personal history of other endocrine, nutritional and metabolic disease    LENNIE (obstructive sleep apnea)    Stage 3 chronic kidney disease, unspecified whether stage 3a or 3b CKD     Surgical Hx:   Past Surgical History:   Procedure Laterality Date    CERVICAL FUSION  1/27/2009    C3-4 fusion    COLONOSCOPY N/A 9/13/2016    Procedure: COLONOSCOPY;  Surgeon: ROWENA Ahn MD;  Location: [address];  Service: Endoscopy;  Laterality: N/A;  Patient requested the week of 9/12. Patient reports PONV.    KNEE SURGERY Left 4-13-15    TK    KNEE SURGERY Right 8-19-15    TK    LUMBAR DISCECTOMY  12/3/2009    L5-S1 microdiscex    PROSTATECTOMY  08/16/2017    SPINE SURGERY  04/01/13    L4/5  laminectomy    TONSILLECTOMY       Medications:   Current Outpatient Medications on File Prior to Visit   Medication Sig Dispense Refill    amLODIPine (NORVASC) 5 MG tablet TAKE 1 TABLET ONE TIME DAILY 90 tablet 2    DULoxetine (CYMBALTA) 60 MG capsule Take 1 capsule (60 mg total) by mouth once daily. 90 capsule 3    fluocinonide (LIDEX) 0.05 % ointment Apply topically daily as needed. 30 g 2    gabapentin (NEURONTIN) 600 MG tablet Take 1 tablet (600 mg total) by mouth 3 (three) times daily. 270 tablet 0    ketorolac 0.5% (ACULAR) 0.5 % Drop INSTILL 1 DROP INTO RIGHT EYE FOR UP TO FOUR TIMES A DAY AS NEEDED FOR IRRITATION 5 mL 2    olmesartan (BENICAR) 20 MG tablet TAKE 1 TABLET EVERY DAY 90 tablet 2    OMEGA 3,6,9 COMBINATION NO.7 (OMEGA DHA ORAL) Take 1 capsule by mouth 2 (two) times daily.      meloxicam (MOBIC) 15 MG tablet       tadalafiL (CIALIS) 20 MG Tab Take 1 tablet (20 mg total) by mouth daily as needed (erectile dysfunction). 10 tablet 11     No current facility-administered medications on file prior to visit.     H/o Asthma: denies    Drug Allergies:   Review of patient's allergies indicates:   Allergen Reactions    Hydroxyzine Nausea And Vomiting and Other (See Comments)     SWEATING    Lyrica [pregabalin] Rash    Tramadol Nausea And Vomiting     Nauseous,Sweating, Ringing in the ears    Vicodin  [hydrocodone-acetaminophen]      Other reaction(s): Rash     Env/Occ:   Pets: no  Occupation: retired     Social Hx:   Social History     Tobacco Use    Smoking status: Never    Smokeless tobacco: Never   Substance Use Topics    Alcohol use: Yes     Comment: occasional    Drug use: No     Family Hx:   Asthma: brother  Family History   Problem Relation Age of Onset    Diabetes Mother     Hypertension Mother     Diabetes Sister     Asthma Brother     Diabetes Brother     Melanoma Neg Hx     Allergic rhinitis Neg Hx     Allergies Neg Hx     Angioedema Neg Hx     Eczema Neg Hx     Atopy Neg Hx     Immunodeficiency  "Neg Hx     Rhinitis Neg Hx     Urticaria Neg Hx       PHYSICAL EXAM     VS: /68 (BP Location: Left arm, Patient Position: Sitting, BP Method: Large (Manual))   Ht 5' 5" (1.651 m)   Wt 120.4 kg (265 lb 6.9 oz)   BMI 44.17 kg/m²   GENERAL: Alert, NAD, well-appearing, cooperative  EYES: EOMI, no conjunctival injection, no discharge, no infraorbital shiners  EARS: external auditory canals normal B/L, TM normal B/L  NOSE: NT 2 + B/L, no stringing mucus, no polyps visualized  ORAL: MMM, no ulcers, no thrush  NECK: no thyromegaly, no LAD  LUNGS: CTAB, no w/r/c, no increased WOB  HEART: RRR, normal S1/S2, no m/g/r  EXTREMITIES: + LE edema  DERM: no rashes, no skin breaks  NEURO: normal speech, no facial asymmetry     LABORATORY STUDIES     Component      Latest Ref Rng & Units 8/15/2022   WBC      3.90 - 12.70 K/uL 6.21   RBC      4.60 - 6.20 M/uL 4.29 (L)   HEMOGLOBIN      14.0 - 18.0 g/dL 13.1 (L)   HEMATOCRIT      40.0 - 54.0 % 41.1   MCV      82 - 98 fL 96   MCH      27.0 - 31.0 pg 30.5   MCHC      32.0 - 36.0 g/dL 31.9 (L)   RDW      11.5 - 14.5 % 13.8   Platelets      150 - 450 K/uL 356   MPV      9.2 - 12.9 fL 9.8   Immature Granulocytes      0.0 - 0.5 % 0.3   Gran # (ANC)      1.8 - 7.7 K/uL 2.5   Immature Grans (Abs)      0.00 - 0.04 K/uL 0.02   Lymph #      1.0 - 4.8 K/uL 2.7   Mono #      0.3 - 1.0 K/uL 0.7   Eos #      0.0 - 0.5 K/uL 0.2   Baso #      0.00 - 0.20 K/uL 0.10   Differential Method       Automated   Sodium      136 - 145 mmol/L 139   Potassium      3.5 - 5.1 mmol/L 5.1   Chloride      95 - 110 mmol/L 105   CO2      23 - 29 mmol/L 27   Glucose      70 - 110 mg/dL 118 (H)   BUN      8 - 23 mg/dL 18   Creatinine      0.5 - 1.4 mg/dL 1.4   Calcium      8.7 - 10.5 mg/dL 10.1   PROTEIN TOTAL      6.0 - 8.4 g/dL 7.3   Albumin      3.5 - 5.2 g/dL 3.9   BILIRUBIN TOTAL      0.1 - 1.0 mg/dL 0.6   Alkaline Phosphatase      55 - 135 U/L 69   AST      10 - 40 U/L 17   ALT      10 - 44 U/L 15 "   Hemoglobin A1C External      4.0 - 5.6 % 5.7 (H)   TSH      0.400 - 4.000 uIU/mL 1.930   Free T4      0.71 - 1.51 ng/dL 0.83   PSA, Screen      0.00 - 4.00 ng/mL <0.01      ALLERGEN TESTING     Immunocaps: Ordered.     IMAGING & OTHER DIAGNOSTICS     CT head 8/8/19:  FINDINGS:  There is generalized cerebral volume loss.  There is hypoattenuation in a periventricular fashion, likely sequela of chronic microvascular ischemic change.  There is no evidence of acute major vascular territory infarct, hemorrhage, or mass.  There is no hydrocephalus.  There are no abnormal extra-axial fluid collections.  The paranasal sinuses and mastoid air cells are clear, and there is no evidence of calvarial fracture.  The visualized soft tissues are unremarkable.  Impression:  1. No acute intracranial abnormalities, noting sequela of chronic microvascular ischemic change and senescent change.    CXR 8/8/19:  FINDINGS:  Heart size within normal limits.  Mediastinum is unremarkable.  There is no tracheal abnormalities.  Lungs are well inflated.  There is no lobar consolidations or pneumothorax or pulmonary vascular congestion or pleural effusions.  There are spondylotic changes in the dorsal spine.  Impression:  No acute intrathoracic processes.     CHART REVIEW     Reviewed pcp note, labs, imaging.     ASSESSMENT & PLAN     Bryan Queen is a 72 y.o. male with     # Chronic rhinitis, throat clearing, ocular pruritus: The most bothersome symptom is the throat clearing. Suspect the throat clearing is due to post nasal drip. He is interested in allergy testing for mold (had mold in home due to Renetta, remediated 3-4 months ago). He is also interested in trying medication to help with the throat clearing.  -immunocaps for aeroallergens (including molds) ordered.  -start cetirizine 10 mg daily.  -start flonase 1-2 SEN BID.  -counseled on proper use of nasal sprays.    Follow up: Offered scheduled follow up, but patient prefers to follow up as  needed.      Isela Morales MD  Allergy/Immunology

## 2022-12-16 LAB
A ALTERNATA IGE QN: <0.1 KU/L
A FUMIGATUS IGE QN: <0.1 KU/L
ALLERGEN ACREMONIUM (CEPHALOSPORIUM) CLASS: NORMAL
ALLERGEN ACREMONIUM (CEPHALOSPORIUM) IGE: <0.1 KU/L
ALLERGEN CHAETOMIUM GLOBOSUM IGE: <0.1 KU/L
B CINEREA IGE QN: <0.1 KU/L
BERMUDA GRASS IGE QN: <0.1 KU/L
C ALBICANS IGE QN: <0.1 KU/L
C HERBARUM IGE QN: <0.1 KU/L
C LUNATA IGE QN: <0.1 KU/L
CAT DANDER IGE QN: <0.1 KU/L
CEDAR IGE QN: <0.1 KU/L
CHAETOMIUM GLOB. CLASS: NORMAL
D FARINAE IGE QN: <0.1 KU/L
D PTERONYSS IGE QN: <0.1 KU/L
DEPRECATED A ALTERNATA IGE RAST QL: NORMAL
DEPRECATED A FUMIGATUS IGE RAST QL: NORMAL
DEPRECATED B CINEREA IGE RAST QL: NORMAL
DEPRECATED BERMUDA GRASS IGE RAST QL: NORMAL
DEPRECATED C ALBICANS IGE RAST QL: NORMAL
DEPRECATED C HERBARUM IGE RAST QL: NORMAL
DEPRECATED C LUNATA IGE RAST QL: NORMAL
DEPRECATED CAT DANDER IGE RAST QL: NORMAL
DEPRECATED CEDAR IGE RAST QL: NORMAL
DEPRECATED D FARINAE IGE RAST QL: NORMAL
DEPRECATED D PTERONYSS IGE RAST QL: NORMAL
DEPRECATED DOG DANDER IGE RAST QL: NORMAL
DEPRECATED ELDER IGE RAST QL: NORMAL
DEPRECATED ENGL PLANTAIN IGE RAST QL: NORMAL
DEPRECATED M RACEMOSUS IGE RAST QL: NORMAL
DEPRECATED P BETAE IGE RAST QL: NORMAL
DEPRECATED P NOTATUM IGE RAST QL: NORMAL
DEPRECATED PECAN/HICK TREE IGE RAST QL: NORMAL
DEPRECATED R NIGRICANS IGE RAST QL: NORMAL
DEPRECATED ROACH IGE RAST QL: NORMAL
DEPRECATED S ROSTRATA IGE RAST QL: NORMAL
DEPRECATED T VIRIDE IGE RAST QL: NORMAL
DEPRECATED TIMOTHY IGE RAST QL: NORMAL
DEPRECATED WEST RAGWEED IGE RAST QL: NORMAL
DEPRECATED WHITE OAK IGE RAST QL: NORMAL
DOG DANDER IGE QN: <0.1 KU/L
ELDER IGE QN: <0.1 KU/L
ENGL PLANTAIN IGE QN: <0.1 KU/L
EPICOCCUM PUPURASCENS CLASS: NORMAL
EPICOCCUM PURPURASCENS, IGE: <0.1 KU/L
M RACEMOSUS IGE QN: <0.1 KU/L
P BETAE IGE QN: <0.1 KU/L
P NOTATUM IGE QN: <0.1 KU/L
PECAN/HICK TREE IGE QN: <0.1 KU/L
R NIGRICANS IGE QN: <0.1 KU/L
ROACH IGE QN: <0.1 KU/L
S ROSTRATA IGE QN: <0.1 KU/L
STEMPHYLIUM HERBARUM CLASS: NORMAL
STEMPHYLLIUM, IGE: <0.1 KU/L
T VIRIDE IGE QN: <0.1 KU/L
TIMOTHY IGE QN: <0.1 KU/L
WEST RAGWEED IGE QN: <0.1 KU/L
WHITE OAK IGE QN: <0.1 KU/L

## 2022-12-20 LAB
DEPRECATED RHODATORULA IGE RAST QL: 0
RHODATORULA IGE QN: <0.35 KU/L

## 2022-12-23 ENCOUNTER — PATIENT MESSAGE (OUTPATIENT)
Dept: ALLERGY | Facility: CLINIC | Age: 72
End: 2022-12-23
Payer: MEDICARE

## 2023-01-10 DIAGNOSIS — G95.9 CERVICAL MYELOPATHY WITH CERVICAL RADICULOPATHY: ICD-10-CM

## 2023-01-10 DIAGNOSIS — M47.26 OSTEOARTHRITIS OF SPINE WITH RADICULOPATHY, LUMBAR REGION: ICD-10-CM

## 2023-01-10 DIAGNOSIS — M17.0 PRIMARY OSTEOARTHRITIS OF BOTH KNEES: ICD-10-CM

## 2023-01-10 DIAGNOSIS — M54.12 CERVICAL MYELOPATHY WITH CERVICAL RADICULOPATHY: ICD-10-CM

## 2023-01-10 RX ORDER — GABAPENTIN 600 MG/1
TABLET ORAL
Qty: 270 TABLET | Refills: 0 | OUTPATIENT
Start: 2023-01-10

## 2023-01-10 RX ORDER — DULOXETIN HYDROCHLORIDE 60 MG/1
CAPSULE, DELAYED RELEASE ORAL
Qty: 90 CAPSULE | Refills: 0 | OUTPATIENT
Start: 2023-01-10

## 2023-01-10 NOTE — TELEPHONE ENCOUNTER
No new care gaps identified.  [facility] Embedded Care Gaps. Reference number: [id number]. 1/10/2023   5:46:49 AM CST

## 2023-01-11 NOTE — TELEPHONE ENCOUNTER
Refill Decision Note   Bryan Queen  is requesting a refill authorization.  Brief Assessment and Rationale for Refill:  Quick Discontinue     Medication Therapy Plan: Pharmacy is requesting new scripts for the following medications without required information, (sig/ frequency/qty/etc)     Medication Reconciliation Completed: No   Comments:     No Care Gaps recommended.     Note composed:10:56 PM 01/10/2023

## 2023-01-22 ENCOUNTER — PATIENT MESSAGE (OUTPATIENT)
Dept: ALLERGY | Facility: CLINIC | Age: 73
End: 2023-01-22
Payer: MEDICARE

## 2023-01-25 ENCOUNTER — PATIENT MESSAGE (OUTPATIENT)
Dept: ALLERGY | Facility: CLINIC | Age: 73
End: 2023-01-25
Payer: MEDICARE

## 2023-01-25 RX ORDER — CETIRIZINE HYDROCHLORIDE 10 MG/1
10 TABLET ORAL DAILY
Qty: 30 TABLET | Refills: 11 | Status: SHIPPED | OUTPATIENT
Start: 2023-01-25 | End: 2023-01-26

## 2023-01-25 RX ORDER — FLUTICASONE PROPIONATE 50 MCG
SPRAY, SUSPENSION (ML) NASAL
Qty: 16 G | Refills: 11 | Status: SHIPPED | OUTPATIENT
Start: 2023-01-25 | End: 2023-01-26 | Stop reason: SDUPTHER

## 2023-01-26 RX ORDER — FLUTICASONE PROPIONATE 50 MCG
SPRAY, SUSPENSION (ML) NASAL
Qty: 16 G | Refills: 11 | Status: SHIPPED | OUTPATIENT
Start: 2023-01-26

## 2023-01-26 RX ORDER — CETIRIZINE HYDROCHLORIDE 10 MG/1
10 TABLET ORAL DAILY
Qty: 30 TABLET | Refills: 11 | Status: SHIPPED | OUTPATIENT
Start: 2023-01-26 | End: 2024-01-26

## 2023-02-07 ENCOUNTER — TELEPHONE (OUTPATIENT)
Dept: ALLERGY | Facility: CLINIC | Age: 73
End: 2023-02-07
Payer: MEDICARE

## 2023-02-07 NOTE — TELEPHONE ENCOUNTER
"Returned call to [facility] pharmacy. Per pharmacist, Stefanie flonase prescription is " high dosing and isn't approved."     fluticasone propionate (FLONASE) 50 mcg/actuation nasal spray 16 g 11 2023  No   Si-2 sprays each nostril twice daily   Sent to pharmacy as: fluticasone propionate (FLONASE) 50 mcg/actuation nasal spray   Class: Normal   Order: [id number]   Date/Time Signed: 2023 17:47           Phone call to  who confirmed the above dose.   Pharmacist states," well, if the provider is requesting this dose, a PA will be required." I explained that we do not obtain PA's for over the counter medications. Pharmacy will dispense 1 spray twice daily for a 90 day supply with 3 refills.     I will call patient and instruct patient to take as originally prescribed     Phone call to patient, no answer, message left for patient to return my call.   "

## 2023-02-07 NOTE — TELEPHONE ENCOUNTER
----- Message from Naomi Naranjo sent at 2/7/2023 10:04 AM CST -----  Regarding: clarification  Contact: emilia   [phone]  Emilia  from [facility]armacy  is calling to get clarification on fluticasone propionate (FLONASE) 50 mcg/actuation nasal spray. Asking for a call back

## 2023-02-09 DIAGNOSIS — M54.12 CERVICAL MYELOPATHY WITH CERVICAL RADICULOPATHY: ICD-10-CM

## 2023-02-09 DIAGNOSIS — Z00.00 ENCOUNTER FOR MEDICARE ANNUAL WELLNESS EXAM: ICD-10-CM

## 2023-02-09 DIAGNOSIS — G95.9 CERVICAL MYELOPATHY WITH CERVICAL RADICULOPATHY: ICD-10-CM

## 2023-02-09 DIAGNOSIS — M47.26 OSTEOARTHRITIS OF SPINE WITH RADICULOPATHY, LUMBAR REGION: ICD-10-CM

## 2023-02-09 DIAGNOSIS — M17.0 PRIMARY OSTEOARTHRITIS OF BOTH KNEES: ICD-10-CM

## 2023-02-09 RX ORDER — GABAPENTIN 600 MG/1
TABLET ORAL
Qty: 270 TABLET | Refills: 0 | OUTPATIENT
Start: 2023-02-09

## 2023-02-09 RX ORDER — DULOXETIN HYDROCHLORIDE 60 MG/1
CAPSULE, DELAYED RELEASE ORAL
Qty: 90 CAPSULE | Refills: 0 | OUTPATIENT
Start: 2023-02-09

## 2023-02-09 NOTE — TELEPHONE ENCOUNTER
Refill Routing Note   Medication(s) are not appropriate for processing by [facility] for the following reason(s):       Med OP    [facility] action(s):  Quick Discontinue  Route              Medication Therapy Plan: OUTSIDE OF PROTOCOL    Appointments  past 12m or future 3m with PCP    Date Provider   Last Visit   8/15/2022 Jcarlos Li MD   Next Visit   Visit date not found Jcarlos Li MD   ED visits in past 90 days: 0        Note composed:1:34 PM 02/09/2023

## 2023-02-09 NOTE — TELEPHONE ENCOUNTER
No new care gaps identified.  [facility] Embedded Care Gaps. Reference number: [id number]. 2/09/2023   1:17:31 PM CST

## 2023-02-09 NOTE — TELEPHONE ENCOUNTER
Refill Decision Note   Bryan Queen  is requesting a refill authorization.  Brief Assessment and Rationale for Refill:  Quick Discontinue     Medication Therapy Plan:      Pharmacy is requesting new scripts for the following medications without required information, (sig/ frequency/qty/etc)      Medication Reconciliation Completed: No     Comments: Pharmacies have been requesting medications for patients without required information, (sig, frequency, qty, etc.). In addition, requests are sent for medication(s) pt. are currently not taking, and medications patients have never taken.    We have spoken to the pharmacies about these request types and advised their teams previously that we are unable to assess these New Script requests and require all details for these requests. This is a known issue and has been reported.     Note composed:4:39 PM 02/09/2023

## 2023-04-27 ENCOUNTER — PES CALL (OUTPATIENT)
Dept: ADMINISTRATIVE | Facility: CLINIC | Age: 73
End: 2023-04-27
Payer: MEDICARE

## 2023-05-03 DIAGNOSIS — G95.9 CERVICAL MYELOPATHY WITH CERVICAL RADICULOPATHY: ICD-10-CM

## 2023-05-03 DIAGNOSIS — M54.12 CERVICAL MYELOPATHY WITH CERVICAL RADICULOPATHY: ICD-10-CM

## 2023-05-03 DIAGNOSIS — M47.26 OSTEOARTHRITIS OF SPINE WITH RADICULOPATHY, LUMBAR REGION: ICD-10-CM

## 2023-05-04 DIAGNOSIS — G95.9 CERVICAL MYELOPATHY WITH CERVICAL RADICULOPATHY: ICD-10-CM

## 2023-05-04 DIAGNOSIS — M54.12 CERVICAL MYELOPATHY WITH CERVICAL RADICULOPATHY: ICD-10-CM

## 2023-05-04 DIAGNOSIS — M47.26 OSTEOARTHRITIS OF SPINE WITH RADICULOPATHY, LUMBAR REGION: ICD-10-CM

## 2023-05-04 RX ORDER — DULOXETIN HYDROCHLORIDE 60 MG/1
60 CAPSULE, DELAYED RELEASE ORAL DAILY
Qty: 90 CAPSULE | Refills: 3 | Status: CANCELLED | OUTPATIENT
Start: 2023-05-04

## 2023-05-04 RX ORDER — DULOXETIN HYDROCHLORIDE 60 MG/1
60 CAPSULE, DELAYED RELEASE ORAL DAILY
Qty: 90 CAPSULE | Refills: 1 | Status: SHIPPED | OUTPATIENT
Start: 2023-05-04 | End: 2023-09-05 | Stop reason: SDUPTHER

## 2023-05-04 NOTE — TELEPHONE ENCOUNTER
No care due was identified.  [facility] Embedded Care Due Messages. Reference number: [id number].   5/03/2023 11:41:48 PM CDT

## 2023-05-04 NOTE — TELEPHONE ENCOUNTER
No care due was identified.  [facility] Embedded Care Due Messages. Reference number: [id number].   5/04/2023 2:02:04 PM CDT

## 2023-08-16 ENCOUNTER — PATIENT MESSAGE (OUTPATIENT)
Dept: FAMILY MEDICINE | Facility: CLINIC | Age: 73
End: 2023-08-16
Payer: MEDICARE

## 2023-08-16 DIAGNOSIS — I10 ESSENTIAL HYPERTENSION: ICD-10-CM

## 2023-08-16 DIAGNOSIS — K63.5 POLYP OF COLON, UNSPECIFIED PART OF COLON, UNSPECIFIED TYPE: Primary | ICD-10-CM

## 2023-08-16 DIAGNOSIS — Z12.11 ENCOUNTER FOR SCREENING FOR MALIGNANT NEOPLASM OF COLON: ICD-10-CM

## 2023-08-16 RX ORDER — AMLODIPINE BESYLATE 5 MG/1
5 TABLET ORAL
Qty: 90 TABLET | Refills: 0 | Status: SHIPPED | OUTPATIENT
Start: 2023-08-16 | End: 2023-09-05 | Stop reason: SDUPTHER

## 2023-08-16 RX ORDER — OLMESARTAN MEDOXOMIL 20 MG/1
TABLET ORAL
Qty: 90 TABLET | Refills: 0 | Status: SHIPPED | OUTPATIENT
Start: 2023-08-16 | End: 2023-09-05 | Stop reason: SDUPTHER

## 2023-08-16 NOTE — TELEPHONE ENCOUNTER
Refill Routing Note   Medication(s) are not appropriate for processing by [facility] for the following reason(s):      Required labs outdated    [facility] action(s):  Defer  Approve Care Due:  Appointment due  Labs due              Appointments  past 12m or future 3m with PCP    Date Provider   Last Visit   8/15/2022 Jcarlos Li MD   Next Visit   Visit date not found Jcarlos Li MD   ED visits in past 90 days: 0        Note composed:11:13 AM 08/16/2023

## 2023-08-16 NOTE — TELEPHONE ENCOUNTER
Care Due:                  Date            Visit Type   Department     Provider  --------------------------------------------------------------------------------                                NP -         [facility] FAM[facility]                              PRIMARY      MED/ INTERNAL  Last Visit: 08-      CARE ([facility])   MED/ PEDS      ASHLEY MCMAHON  Next Visit: None Scheduled  None         None Found                                                            Last  Test          Frequency    Reason                     Performed    Due Date  --------------------------------------------------------------------------------    Office Visit  12 months..  DULoxetine, olmesartan...  08-   08-    [facility].........  12 months..  DULoxetine, olmesartan...  08-   08-    [facility] Embedded Care Due Messages. Reference number: [id number].   8/16/2023 3:38:59 AM CDT

## 2023-08-24 DIAGNOSIS — N18.30 STAGE 3 CHRONIC KIDNEY DISEASE, UNSPECIFIED WHETHER STAGE 3A OR 3B CKD: ICD-10-CM

## 2023-09-05 ENCOUNTER — OFFICE VISIT (OUTPATIENT)
Dept: FAMILY MEDICINE | Facility: CLINIC | Age: 73
End: 2023-09-05
Payer: MEDICARE

## 2023-09-05 VITALS
OXYGEN SATURATION: 98 % | TEMPERATURE: 99 F | WEIGHT: 268.94 LBS | HEIGHT: 65 IN | DIASTOLIC BLOOD PRESSURE: 64 MMHG | SYSTOLIC BLOOD PRESSURE: 130 MMHG | BODY MASS INDEX: 44.81 KG/M2 | HEART RATE: 89 BPM

## 2023-09-05 DIAGNOSIS — M47.26 OSTEOARTHRITIS OF SPINE WITH RADICULOPATHY, LUMBAR REGION: ICD-10-CM

## 2023-09-05 DIAGNOSIS — R79.9 ABNORMAL FINDING OF BLOOD CHEMISTRY, UNSPECIFIED: ICD-10-CM

## 2023-09-05 DIAGNOSIS — M54.12 CERVICAL MYELOPATHY WITH CERVICAL RADICULOPATHY: ICD-10-CM

## 2023-09-05 DIAGNOSIS — Z12.5 ENCOUNTER FOR SCREENING FOR MALIGNANT NEOPLASM OF PROSTATE: ICD-10-CM

## 2023-09-05 DIAGNOSIS — G95.9 CERVICAL MYELOPATHY WITH CERVICAL RADICULOPATHY: ICD-10-CM

## 2023-09-05 DIAGNOSIS — Z00.00 ROUTINE PHYSICAL EXAMINATION: Primary | ICD-10-CM

## 2023-09-05 DIAGNOSIS — N18.30 STAGE 3 CHRONIC KIDNEY DISEASE, UNSPECIFIED WHETHER STAGE 3A OR 3B CKD: ICD-10-CM

## 2023-09-05 DIAGNOSIS — E66.01 MORBID OBESITY DUE TO EXCESS CALORIES: ICD-10-CM

## 2023-09-05 DIAGNOSIS — Z12.11 ENCOUNTER FOR SCREENING FOR MALIGNANT NEOPLASM OF COLON: ICD-10-CM

## 2023-09-05 DIAGNOSIS — M17.0 PRIMARY OSTEOARTHRITIS OF BOTH KNEES: ICD-10-CM

## 2023-09-05 DIAGNOSIS — I10 ESSENTIAL HYPERTENSION: Chronic | ICD-10-CM

## 2023-09-05 PROCEDURE — 3288F PR FALLS RISK ASSESSMENT DOCUMENTED: ICD-10-PCS | Mod: HCNC,CPTII,S$GLB, | Performed by: FAMILY MEDICINE

## 2023-09-05 PROCEDURE — 1101F PT FALLS ASSESS-DOCD LE1/YR: CPT | Mod: HCNC,CPTII,S$GLB, | Performed by: FAMILY MEDICINE

## 2023-09-05 PROCEDURE — 3078F PR MOST RECENT DIASTOLIC BLOOD PRESSURE < 80 MM HG: ICD-10-PCS | Mod: HCNC,CPTII,S$GLB, | Performed by: FAMILY MEDICINE

## 2023-09-05 PROCEDURE — 99999 PR PBB SHADOW E&M-EST. PATIENT-LVL III: ICD-10-PCS | Mod: PBBFAC,HCNC,, | Performed by: FAMILY MEDICINE

## 2023-09-05 PROCEDURE — 1159F MED LIST DOCD IN RCRD: CPT | Mod: HCNC,CPTII,S$GLB, | Performed by: FAMILY MEDICINE

## 2023-09-05 PROCEDURE — 1126F PR PAIN SEVERITY QUANTIFIED, NO PAIN PRESENT: ICD-10-PCS | Mod: HCNC,CPTII,S$GLB, | Performed by: FAMILY MEDICINE

## 2023-09-05 PROCEDURE — 3075F SYST BP GE 130 - 139MM HG: CPT | Mod: HCNC,CPTII,S$GLB, | Performed by: FAMILY MEDICINE

## 2023-09-05 PROCEDURE — 3288F FALL RISK ASSESSMENT DOCD: CPT | Mod: HCNC,CPTII,S$GLB, | Performed by: FAMILY MEDICINE

## 2023-09-05 PROCEDURE — 99999 PR PBB SHADOW E&M-EST. PATIENT-LVL III: CPT | Mod: PBBFAC,HCNC,, | Performed by: FAMILY MEDICINE

## 2023-09-05 PROCEDURE — 1159F PR MEDICATION LIST DOCUMENTED IN MEDICAL RECORD: ICD-10-PCS | Mod: HCNC,CPTII,S$GLB, | Performed by: FAMILY MEDICINE

## 2023-09-05 PROCEDURE — 99397 PR PREVENTIVE VISIT,EST,65 & OVER: ICD-10-PCS | Mod: HCNC,S$GLB,, | Performed by: FAMILY MEDICINE

## 2023-09-05 PROCEDURE — 1101F PR PT FALLS ASSESS DOC 0-1 FALLS W/OUT INJ PAST YR: ICD-10-PCS | Mod: HCNC,CPTII,S$GLB, | Performed by: FAMILY MEDICINE

## 2023-09-05 PROCEDURE — 3075F PR MOST RECENT SYSTOLIC BLOOD PRESS GE 130-139MM HG: ICD-10-PCS | Mod: HCNC,CPTII,S$GLB, | Performed by: FAMILY MEDICINE

## 2023-09-05 PROCEDURE — 3008F PR BODY MASS INDEX (BMI) DOCUMENTED: ICD-10-PCS | Mod: HCNC,CPTII,S$GLB, | Performed by: FAMILY MEDICINE

## 2023-09-05 PROCEDURE — 3078F DIAST BP <80 MM HG: CPT | Mod: HCNC,CPTII,S$GLB, | Performed by: FAMILY MEDICINE

## 2023-09-05 PROCEDURE — 1126F AMNT PAIN NOTED NONE PRSNT: CPT | Mod: HCNC,CPTII,S$GLB, | Performed by: FAMILY MEDICINE

## 2023-09-05 PROCEDURE — 4010F PR ACE/ARB THEARPY RXD/TAKEN: ICD-10-PCS | Mod: HCNC,CPTII,S$GLB, | Performed by: FAMILY MEDICINE

## 2023-09-05 PROCEDURE — 4010F ACE/ARB THERAPY RXD/TAKEN: CPT | Mod: HCNC,CPTII,S$GLB, | Performed by: FAMILY MEDICINE

## 2023-09-05 PROCEDURE — 3008F BODY MASS INDEX DOCD: CPT | Mod: HCNC,CPTII,S$GLB, | Performed by: FAMILY MEDICINE

## 2023-09-05 PROCEDURE — 99397 PER PM REEVAL EST PAT 65+ YR: CPT | Mod: HCNC,S$GLB,, | Performed by: FAMILY MEDICINE

## 2023-09-05 RX ORDER — AMLODIPINE BESYLATE 5 MG/1
5 TABLET ORAL DAILY
Qty: 90 TABLET | Refills: 3 | Status: SHIPPED | OUTPATIENT
Start: 2023-09-05

## 2023-09-05 RX ORDER — DULOXETIN HYDROCHLORIDE 60 MG/1
60 CAPSULE, DELAYED RELEASE ORAL DAILY
Qty: 90 CAPSULE | Refills: 3 | Status: SHIPPED | OUTPATIENT
Start: 2023-09-05

## 2023-09-05 RX ORDER — OLMESARTAN MEDOXOMIL 20 MG/1
20 TABLET ORAL DAILY
Qty: 90 TABLET | Refills: 3 | Status: SHIPPED | OUTPATIENT
Start: 2023-09-05 | End: 2023-12-12 | Stop reason: SDUPTHER

## 2023-09-05 RX ORDER — GABAPENTIN 600 MG/1
600 TABLET ORAL 3 TIMES DAILY
Qty: 270 TABLET | Refills: 3 | Status: SHIPPED | OUTPATIENT
Start: 2023-09-05

## 2023-09-05 NOTE — PROGRESS NOTES
[facility]  Progress Note    SUBJECTIVE:     Chief Complaint   Patient presents with    Annual Exam    Weight Loss       HPI   Bryan Queen  is a 72 y.o. male here for physical exam. Patient has no other new complaints/problems at this time.      Review of patient's allergies indicates:   Allergen Reactions    Hydroxyzine Nausea And Vomiting and Other (See Comments)     SWEATING    Lyrica [pregabalin] Rash    Tramadol Nausea And Vomiting     Nauseous,Sweating, Ringing in the ears    Vicodin  [hydrocodone-acetaminophen]      Other reaction(s): Rash       Past Medical History:   Diagnosis Date    Allergy     Arthritis     BPH (benign prostatic hypertrophy)     Cancer     prostate    Colon polyps     Elevated PSA     Groin abscess     Hypertension     Joint pain     Lumbar radiculopathy     Obstructive sleep apnea (adult) (pediatric)     CPAP hs    PONV (postoperative nausea and vomiting)     Prostate cancer     Spinal stenosis      Past Surgical History:   Procedure Laterality Date    CERVICAL FUSION  1/27/2009    C3-4 fusion    COLONOSCOPY N/A 9/13/2016    Procedure: COLONOSCOPY;  Surgeon: ROWENA Ahn MD;  Location: [facility] ([address] [place] [address]);  Service: Endoscopy;  Laterality: N/A;  Patient requested the week of 9/12. Patient reports PONV.    KNEE SURGERY Left 4-13-15    TK    KNEE SURGERY Right 8-19-15    TK    LUMBAR DISCECTOMY  12/3/2009    L5-S1 microdiscex    PROSTATECTOMY  08/16/2017    SPINE SURGERY  04/01/13    L4/5 laminectomy    TONSILLECTOMY       Family History   Problem Relation Age of Onset    Diabetes Mother     Hypertension Mother     Diabetes Sister     Asthma Brother     Diabetes Brother     Melanoma Neg Hx     Allergic rhinitis Neg Hx     Allergies Neg Hx     Angioedema Neg Hx     Eczema Neg Hx     Atopy Neg Hx     Immunodeficiency Neg Hx     Rhinitis Neg Hx     Urticaria Neg Hx      Social History     Tobacco Use    Smoking status: Never    Smokeless tobacco: Never   Substance Use Topics     Alcohol use: Yes     Comment: occasional    Drug use: No        Review of Systems   Constitutional:  Negative for chills, diaphoresis and fever.   HENT:  Negative for congestion, ear pain and sore throat.    Eyes:  Negative for photophobia and discharge.   Respiratory:  Negative for cough, shortness of breath and wheezing.    Cardiovascular:  Negative for chest pain and palpitations.   Gastrointestinal:  Negative for abdominal pain, constipation, diarrhea, nausea and vomiting.   Genitourinary:  Negative for dysuria and hematuria.   Musculoskeletal:  Negative for back pain and myalgias.   Skin:  Negative for itching and rash.   Neurological:  Negative for dizziness, sensory change, focal weakness, weakness and headaches.   All other systems reviewed and are negative.    OBJECTIVE:     Vitals:    09/05/23 1454   BP: 130/64   Pulse: 89   Temp: 98.7 °F (37.1 °C)     Body mass index is 44.76 kg/m².    Physical Exam  Constitutional:       General: He is not in acute distress.     Appearance: He is not diaphoretic.   HENT:      Head: Normocephalic and atraumatic.      Right Ear: Tympanic membrane and ear canal normal. No hemotympanum. Tympanic membrane is not perforated, erythematous or bulging.      Left Ear: Tympanic membrane and ear canal normal. No hemotympanum. Tympanic membrane is not perforated, erythematous or bulging.   Eyes:      Conjunctiva/sclera: Conjunctivae normal.      Pupils: Pupils are equal, round, and reactive to light.   Neck:      Thyroid: No thyromegaly.   Cardiovascular:      Rate and Rhythm: Normal rate and regular rhythm.      Heart sounds: Normal heart sounds. No murmur heard.     No friction rub. No gallop.   Pulmonary:      Effort: Pulmonary effort is normal. No respiratory distress.      Breath sounds: Normal breath sounds. No wheezing or rales.   Abdominal:      General: Bowel sounds are normal. There is no distension.      Palpations: Abdomen is soft.      Tenderness: There is no  abdominal tenderness. There is no guarding or rebound.   Musculoskeletal:         General: No tenderness. Normal range of motion.   Skin:     General: Skin is warm.      Findings: No erythema or rash.   Neurological:      Mental Status: He is alert and oriented to person, place, and time.         Old records were reviewed. Labs and/or images were independently reviewed.    ASSESSMENT     1. Routine physical examination    2. Morbid obesity due to excess calories    3. Cervical myelopathy with cervical radiculopathy    4. Stage 3 chronic kidney disease, unspecified whether stage 3a or 3b CKD    5. Essential hypertension    6. Osteoarthritis of spine with radiculopathy, lumbar region    7. Primary osteoarthritis of both knees    8. Abnormal finding of blood chemistry, unspecified    9. Encounter for screening for malignant neoplasm of prostate    10. Encounter for screening for malignant neoplasm of colon        PLAN:     Routine physical examination  -     CBC Auto Differential; Future  -     Comprehensive Metabolic Panel; Future  -     Hemoglobin A1C; Future  -     Lipid Panel; Future  -     TSH; Future  -     T4, Free; Future  -     PSA, Screening; Future; Expected date: 09/05/2023  -     PTH, intact; Future; Expected date: 09/05/2023  -     We briefly discussed diet, exercise, and routine preventive exams. All questions and comments addressed.    Morbid obesity due to excess calories   -     Counseled patient about healthy diet, exercise habits, and to increase physical activity.    Cervical myelopathy with cervical radiculopathy  -     gabapentin (NEURONTIN) 600 MG tablet; Take 1 tablet (600 mg total) by mouth 3 (three) times daily.  Dispense: 270 tablet; Refill: 3  -     DULoxetine (CYMBALTA) 60 MG capsule; Take 1 capsule (60 mg total) by mouth once daily.  Dispense: 90 capsule; Refill: 3  -     Stable. Continue current regimen.    Stage 3 chronic kidney disease, unspecified whether stage 3a or 3b CKD  -      PTH, intact; Future; Expected date: 09/05/2023  -     Vitamin D; Future; Expected date: 09/05/2023    Essential hypertension  -     amLODIPine (NORVASC) 5 MG tablet; Take 1 tablet (5 mg total) by mouth once daily.  Dispense: 90 tablet; Refill: 3  -     olmesartan (BENICAR) 20 MG tablet; Take 1 tablet (20 mg total) by mouth once daily.  Dispense: 90 tablet; Refill: 3  -     CBC Auto Differential; Future  -     Comprehensive Metabolic Panel; Future  -     Hemoglobin A1C; Future  -     Lipid Panel; Future  -     TSH; Future  -     T4, Free; Future  -     PSA, Screening; Future; Expected date: 09/05/2023    Osteoarthritis of spine with radiculopathy, lumbar region  -     gabapentin (NEURONTIN) 600 MG tablet; Take 1 tablet (600 mg total) by mouth 3 (three) times daily.  Dispense: 270 tablet; Refill: 3  -     DULoxetine (CYMBALTA) 60 MG capsule; Take 1 capsule (60 mg total) by mouth once daily.  Dispense: 90 capsule; Refill: 3    Primary osteoarthritis of both knees  -     gabapentin (NEURONTIN) 600 MG tablet; Take 1 tablet (600 mg total) by mouth 3 (three) times daily.  Dispense: 270 tablet; Refill: 3  -     Stable. Continue current regimen.    Encounter for screening for malignant neoplasm of prostate  -     PSA, Screening; Future; Expected date: 09/05/2023    Encounter for screening for malignant neoplasm of colon  -     Ambulatory referral/consult to Endo Procedure ; Future; Expected date: 09/06/2023      RTC PRMARK Li MD  09/05/2023 3:07 PM

## 2023-09-06 ENCOUNTER — CLINICAL SUPPORT (OUTPATIENT)
Dept: ENDOSCOPY | Facility: HOSPITAL | Age: 73
End: 2023-09-06
Attending: FAMILY MEDICINE
Payer: MEDICARE

## 2023-09-06 ENCOUNTER — TELEPHONE (OUTPATIENT)
Dept: ENDOSCOPY | Facility: HOSPITAL | Age: 73
End: 2023-09-06

## 2023-09-06 DIAGNOSIS — Z12.11 ENCOUNTER FOR SCREENING FOR MALIGNANT NEOPLASM OF COLON: ICD-10-CM

## 2023-09-06 DIAGNOSIS — Z86.010 HISTORY OF COLON POLYPS: Primary | ICD-10-CM

## 2023-09-06 RX ORDER — POLYETHYLENE GLYCOL 3350, SODIUM SULFATE ANHYDROUS, SODIUM BICARBONATE, SODIUM CHLORIDE, POTASSIUM CHLORIDE 236; 22.74; 6.74; 5.86; 2.97 G/4L; G/4L; G/4L; G/4L; G/4L
4 POWDER, FOR SOLUTION ORAL ONCE
Qty: 4000 ML | Refills: 0 | Status: SHIPPED | OUTPATIENT
Start: 2023-09-06 | End: 2023-09-06

## 2023-09-06 NOTE — TELEPHONE ENCOUNTER
Spoke to patient to schedule procedure(s) Colonoscopy       Physician to perform procedure(s) Dr. ENA Collazo  Date of Procedure (s) 12/15/23  Arrival Time 12:00 PM  Time of Procedure(s) 1:00 PM   Location of Procedure(s) [address]  Type of Rx Prep sent to patient: PEG  Instructions provided to patient via Postal Mail/portal    Patient was informed on the following information and verbalized understanding. Screening questionnaire reviewed with patient and complete. If procedure requires anesthesia, a responsible adult needs to be present to accompany the patient home, patient cannot drive after receiving anesthesia. Appointment details are tentative, especially check-in time. Patient will receive a prep-op call 4 days prior to confirm check-in time for procedure. If applicable the patient should contact their pharmacy to verify Rx for procedure prep is ready for pick-up. Patient was advised to call the scheduling department at [phone] if pharmacy states no Rx is available. Patient was advised to call the endoscopy scheduling department if any questions or concerns arise.      [facility] Scheduling Department

## 2023-09-09 ENCOUNTER — PATIENT MESSAGE (OUTPATIENT)
Dept: FAMILY MEDICINE | Facility: CLINIC | Age: 73
End: 2023-09-09
Payer: MEDICARE

## 2023-09-09 ENCOUNTER — LAB VISIT (OUTPATIENT)
Dept: LAB | Facility: HOSPITAL | Age: 73
End: 2023-09-09
Attending: FAMILY MEDICINE
Payer: MEDICARE

## 2023-09-09 DIAGNOSIS — N18.30 STAGE 3 CHRONIC KIDNEY DISEASE, UNSPECIFIED WHETHER STAGE 3A OR 3B CKD: ICD-10-CM

## 2023-09-09 DIAGNOSIS — I10 ESSENTIAL HYPERTENSION: Chronic | ICD-10-CM

## 2023-09-09 DIAGNOSIS — Z00.00 ROUTINE PHYSICAL EXAMINATION: ICD-10-CM

## 2023-09-09 DIAGNOSIS — Z12.5 ENCOUNTER FOR SCREENING FOR MALIGNANT NEOPLASM OF PROSTATE: ICD-10-CM

## 2023-09-09 DIAGNOSIS — E66.01 MORBID OBESITY: Primary | ICD-10-CM

## 2023-09-09 DIAGNOSIS — R79.9 ABNORMAL FINDING OF BLOOD CHEMISTRY, UNSPECIFIED: ICD-10-CM

## 2023-09-09 LAB
25(OH)D3+25(OH)D2 SERPL-MCNC: 19 NG/ML (ref 30–96)
ALBUMIN SERPL BCP-MCNC: 3.8 G/DL (ref 3.5–5.2)
ALP SERPL-CCNC: 76 U/L (ref 55–135)
ALT SERPL W/O P-5'-P-CCNC: 16 U/L (ref 10–44)
ANION GAP SERPL CALC-SCNC: 9 MMOL/L (ref 8–16)
AST SERPL-CCNC: 16 U/L (ref 10–40)
BASOPHILS # BLD AUTO: 0.08 K/UL (ref 0–0.2)
BASOPHILS NFR BLD: 1.3 % (ref 0–1.9)
BILIRUB SERPL-MCNC: 0.5 MG/DL (ref 0.1–1)
BUN SERPL-MCNC: 16 MG/DL (ref 8–23)
CALCIUM SERPL-MCNC: 9.3 MG/DL (ref 8.7–10.5)
CHLORIDE SERPL-SCNC: 108 MMOL/L (ref 95–110)
CHOLEST SERPL-MCNC: 163 MG/DL (ref 120–199)
CHOLEST/HDLC SERPL: 5.1 {RATIO} (ref 2–5)
CO2 SERPL-SCNC: 25 MMOL/L (ref 23–29)
COMPLEXED PSA SERPL-MCNC: <0.01 NG/ML (ref 0–4)
CREAT SERPL-MCNC: 1.4 MG/DL (ref 0.5–1.4)
DIFFERENTIAL METHOD: ABNORMAL
EOSINOPHIL # BLD AUTO: 0.2 K/UL (ref 0–0.5)
EOSINOPHIL NFR BLD: 2.9 % (ref 0–8)
ERYTHROCYTE [DISTWIDTH] IN BLOOD BY AUTOMATED COUNT: 13.8 % (ref 11.5–14.5)
EST. GFR  (NO RACE VARIABLE): 53.4 ML/MIN/1.73 M^2
ESTIMATED AVG GLUCOSE: 117 MG/DL (ref 68–131)
GLUCOSE SERPL-MCNC: 145 MG/DL (ref 70–110)
HBA1C MFR BLD: 5.7 % (ref 4–5.6)
HCT VFR BLD AUTO: 42.3 % (ref 40–54)
HDLC SERPL-MCNC: 32 MG/DL (ref 40–75)
HDLC SERPL: 19.6 % (ref 20–50)
HGB BLD-MCNC: 13 G/DL (ref 14–18)
IMM GRANULOCYTES # BLD AUTO: 0.02 K/UL (ref 0–0.04)
IMM GRANULOCYTES NFR BLD AUTO: 0.3 % (ref 0–0.5)
LDLC SERPL CALC-MCNC: 115.2 MG/DL (ref 63–159)
LYMPHOCYTES # BLD AUTO: 3 K/UL (ref 1–4.8)
LYMPHOCYTES NFR BLD: 48 % (ref 18–48)
MCH RBC QN AUTO: 29.5 PG (ref 27–31)
MCHC RBC AUTO-ENTMCNC: 30.7 G/DL (ref 32–36)
MCV RBC AUTO: 96 FL (ref 82–98)
MONOCYTES # BLD AUTO: 0.6 K/UL (ref 0.3–1)
MONOCYTES NFR BLD: 9.1 % (ref 4–15)
NEUTROPHILS # BLD AUTO: 2.4 K/UL (ref 1.8–7.7)
NEUTROPHILS NFR BLD: 38.4 % (ref 38–73)
NONHDLC SERPL-MCNC: 131 MG/DL
NRBC BLD-RTO: 0 /100 WBC
PLATELET # BLD AUTO: 359 K/UL (ref 150–450)
PMV BLD AUTO: 9.6 FL (ref 9.2–12.9)
POTASSIUM SERPL-SCNC: 4.9 MMOL/L (ref 3.5–5.1)
PROT SERPL-MCNC: 7.1 G/DL (ref 6–8.4)
PTH-INTACT SERPL-MCNC: 100.6 PG/ML (ref 9–77)
RBC # BLD AUTO: 4.4 M/UL (ref 4.6–6.2)
SODIUM SERPL-SCNC: 142 MMOL/L (ref 136–145)
T4 FREE SERPL-MCNC: 0.86 NG/DL (ref 0.71–1.51)
TRIGL SERPL-MCNC: 79 MG/DL (ref 30–150)
TSH SERPL DL<=0.005 MIU/L-ACNC: 1.64 UIU/ML (ref 0.4–4)
WBC # BLD AUTO: 6.15 K/UL (ref 3.9–12.7)

## 2023-09-09 PROCEDURE — 85025 COMPLETE CBC W/AUTO DIFF WBC: CPT | Mod: HCNC | Performed by: FAMILY MEDICINE

## 2023-09-09 PROCEDURE — 36415 COLL VENOUS BLD VENIPUNCTURE: CPT | Mod: HCNC,PO | Performed by: FAMILY MEDICINE

## 2023-09-09 PROCEDURE — 80053 COMPREHEN METABOLIC PANEL: CPT | Mod: HCNC | Performed by: FAMILY MEDICINE

## 2023-09-09 PROCEDURE — 82306 VITAMIN D 25 HYDROXY: CPT | Mod: HCNC | Performed by: FAMILY MEDICINE

## 2023-09-09 PROCEDURE — 83970 ASSAY OF PARATHORMONE: CPT | Mod: HCNC | Performed by: FAMILY MEDICINE

## 2023-09-09 PROCEDURE — 84439 ASSAY OF FREE THYROXINE: CPT | Mod: HCNC | Performed by: FAMILY MEDICINE

## 2023-09-09 PROCEDURE — 83036 HEMOGLOBIN GLYCOSYLATED A1C: CPT | Mod: HCNC | Performed by: FAMILY MEDICINE

## 2023-09-09 PROCEDURE — 80061 LIPID PANEL: CPT | Mod: HCNC | Performed by: FAMILY MEDICINE

## 2023-09-09 PROCEDURE — 84153 ASSAY OF PSA TOTAL: CPT | Mod: HCNC | Performed by: FAMILY MEDICINE

## 2023-09-09 PROCEDURE — 84443 ASSAY THYROID STIM HORMONE: CPT | Mod: HCNC | Performed by: FAMILY MEDICINE

## 2023-09-11 PROBLEM — N25.81 SECONDARY HYPERPARATHYROIDISM: Status: ACTIVE | Noted: 2023-09-11

## 2023-09-11 RX ORDER — SEMAGLUTIDE 0.68 MG/ML
0.25 INJECTION, SOLUTION SUBCUTANEOUS
Qty: 3 ML | Refills: 1 | Status: SHIPPED | OUTPATIENT
Start: 2023-09-11

## 2023-09-12 ENCOUNTER — TELEPHONE (OUTPATIENT)
Dept: FAMILY MEDICINE | Facility: CLINIC | Age: 73
End: 2023-09-12
Payer: MEDICARE

## 2023-09-12 NOTE — TELEPHONE ENCOUNTER
----- Message from Rosana Leger sent at 9/11/2023 10:45 AM CDT -----  Regarding: clarify  Type:  Pharmacy Calling to Clarify an RX    Name of Caller:sayda     [facility] [id number], [address]    Prescription Name:semaglutide (OZEMPIC) 0.25 mg or 0.5 mg (2 mg/3 mL) pen injector    What do they need to clarify?: insurance doesn't cover it. Bad diagnosis code. Do you want  go forward with the PA ? Please advise     Best Call Back Number:[phone]     Additional Information:

## 2023-09-26 NOTE — TELEPHONE ENCOUNTER
Called and spoken to kayley and he just wanted clarification about the PA and figure it was closed due to patient not having diabetes.

## 2023-09-26 NOTE — TELEPHONE ENCOUNTER
----- Message from Leidy Webster sent at 9/26/2023 11:44 AM CDT -----  Regarding: Pharmacy call  .Type:  Pharmacy Calling to Clarify an RX    Name of Caller: Juwan     Pharmacy Name: .  [facility] [address] [place] - [address] [address] [facility]  [place] [address]  Phone: [phone] Fax: [phone]    Prescription Name: semaglutide (OZEMPIC) 0.25 mg or 0.5 mg (2 mg/3 mL) pen injector    What do they need to clarify? Checking status of prior authorization request     Best Call Back Number: .[phone]      Additional Information:

## 2023-10-03 ENCOUNTER — TELEPHONE (OUTPATIENT)
Dept: ADMINISTRATIVE | Facility: CLINIC | Age: 73
End: 2023-10-03
Payer: MEDICARE

## 2023-12-12 ENCOUNTER — TELEPHONE (OUTPATIENT)
Dept: FAMILY MEDICINE | Facility: CLINIC | Age: 73
End: 2023-12-12
Payer: MEDICARE

## 2023-12-12 DIAGNOSIS — I10 ESSENTIAL HYPERTENSION: Chronic | ICD-10-CM

## 2023-12-12 RX ORDER — OLMESARTAN MEDOXOMIL 20 MG/1
20 TABLET ORAL DAILY
Qty: 90 TABLET | Refills: 3 | Status: SHIPPED | OUTPATIENT
Start: 2023-12-12

## 2023-12-12 NOTE — TELEPHONE ENCOUNTER
----- Message from Deonna Jacob sent at 12/12/2023 12:21 PM CST -----  Regarding: Terri  Who called: Terri wife       What is the request in detail: pt wife stated provider canceled pt high BP med, pt wife would like call back from nurse because Keith stated med was termed, pt wife doesn't use the portal but will like call back        Can the clinic reply by [facility]? No        Would the patient rather a call back or a response via [facility]? Call back        Best call back number:[phone]

## 2023-12-12 NOTE — TELEPHONE ENCOUNTER
----- Message from Joselin Moon sent at 12/12/2023  3:03 PM CST -----  Regarding: Refill Request  Type: RX Refill Request      Who Called: Terri- wife       Refill or New Rx: refill       RX Name and Strength: olmesartan (BENICAR) 20 MG tablet ---- was told that this Rx was terminated and if it has, needs to know what it has replaced with. Terri stated that Bryan has been taking her Rx.       Preferred Pharmacy with phone number:  [facility] - [place], [place] - [address]      Would the patient rather a call back or a response via [facility]? Call       Best Call Back Number:  [phone]

## 2023-12-14 ENCOUNTER — TELEPHONE (OUTPATIENT)
Dept: ENDOSCOPY | Facility: HOSPITAL | Age: 73
End: 2023-12-14
Payer: MEDICARE

## 2023-12-14 NOTE — TELEPHONE ENCOUNTER
Received call from patient to cancel colonoscopy scheduled for 12/15/23. Offered to reschedule patient. Patient states he is not ready to reschedule at this time and will call when ready. Patient removed from tomorrow's schedule. Patient verbalized an understanding.

## 2024-07-22 ENCOUNTER — PATIENT OUTREACH (OUTPATIENT)
Dept: ADMINISTRATIVE | Facility: HOSPITAL | Age: 74
End: 2024-07-22
Payer: MEDICARE

## 2024-08-26 NOTE — PROGRESS NOTES
Subjective:      Bryan Queen is a 73 y.o. male who returns today regarding his prostate cancer.    He has ED. He's tried and failed cialis.    He has stress urinary incontinence. Reports needing to pull his pants down to locate penis to urinate. He wears 1 pad per day for protection.   Some days he is completely dry   Reports 30-50 lb weight gain over 30 years.    The following portions of the patient's history were reviewed and updated as appropriate: allergies, current medications, past family history, past medical history, past social history, past surgical history and problem list.    Review of Systems  A comprehensive multipoint review of systems was negative except as otherwise stated in the HPI.    Past Medical History:   Diagnosis Date    Allergy     Arthritis     BPH (benign prostatic hypertrophy)     Cancer     prostate    Colon polyps     Elevated PSA     Groin abscess     Hypertension     Joint pain     Lumbar radiculopathy     Obstructive sleep apnea (adult) (pediatric)     CPAP hs    PONV (postoperative nausea and vomiting)     Prostate cancer     Spinal stenosis      Past Surgical History:   Procedure Laterality Date    CERVICAL FUSION  1/27/2009    C3-4 fusion    COLONOSCOPY N/A 9/13/2016    Procedure: COLONOSCOPY;  Surgeon: ROWENA Ahn MD;  Location: [address]);  Service: Endoscopy;  Laterality: N/A;  Patient requested the week of 9/12. Patient reports PONV.    KNEE SURGERY Left 4-13-15    TK    KNEE SURGERY Right 8-19-15    TK    LUMBAR DISCECTOMY  12/3/2009    L5-S1 microdiscex    PROSTATECTOMY  08/16/2017    SPINE SURGERY  04/01/13    L4/5 laminectomy    TONSILLECTOMY       Diabetes Medications               semaglutide (OZEMPIC) 0.25 mg or 0.5 mg (2 mg/3 mL) pen injector Inject 0.25 mg into the skin every 7 days.          Review of patient's allergies indicates:   Allergen Reactions    Hydroxyzine Nausea And Vomiting and Other (See Comments)     SWEATING    Lyrica [pregabalin]  Rash    Tramadol Nausea And Vomiting     Nauseous,Sweating, Ringing in the ears    Vicodin  [hydrocodone-acetaminophen]      Other reaction(s): Rash          Objective:   Vitals: There were no vitals taken for this visit.    Physical Exam   General: alert and oriented, no acute distress  Respiratory: Symmetric expansion, non-labored breathing  Cardiovascular: no peripheral edema  Abdomen: soft, non distended  Skin: normal coloration and turgor, no rashes, no suspicious skin lesions noted  Neuro: no gross deficits  Psych: normal judgment and insight, normal mood/affect, and non-anxious  Buried penis, circumcised penis with orthotopic meatus, no plaques or lesions    Physical Exam    Lab Review   Urinalysis demonstrates trace leukocytes, protein, negative for all other components    Lab Results   Component Value Date    WBC 6.15 09/09/2023    HGB 13.0 (L) 09/09/2023    HCT 42.3 09/09/2023    MCV 96 09/09/2023     09/09/2023     Lab Results   Component Value Date    CREATININE 1.4 09/09/2023    BUN 16 09/09/2023     Lab Results   Component Value Date    PSA <0.01 09/09/2023    PSA <0.01 08/15/2022    PSA <0.01 02/04/2019    PSADIAG <0.01 12/27/2017    PSADIAG <0.01 09/19/2017    PSADIAG 4.6 (H) 03/03/2017    PSATOTAL <0.01 05/18/2021    PSAFREE <0.01 05/18/2021    PSAFREEPCT Unable to calculate 05/18/2021         Imaging  No new imaging.    Assessment and Plan:   History of prostate cancer  MAKAYLA sp RALP 2017  Psa today and in 1 year    ED  Cialis prn  Discussed ICI and IPP; he is not interested  Discussed weight loss to minimize size on mons pubis and maximize penile length    Stress incontinence  Gabis  PFPT    RTC 1 yr with psa to see Dr Evans with psa      He is interested in referral for weight loss; He is not interested in a surgical intervention for weight loss  Will ask his PCP to refer him to the [facility] program

## 2024-08-27 ENCOUNTER — OFFICE VISIT (OUTPATIENT)
Dept: UROLOGY | Facility: CLINIC | Age: 74
End: 2024-08-27
Payer: MEDICARE

## 2024-08-27 ENCOUNTER — LAB VISIT (OUTPATIENT)
Dept: LAB | Facility: OTHER | Age: 74
End: 2024-08-27
Attending: UROLOGY
Payer: MEDICARE

## 2024-08-27 VITALS
WEIGHT: 260.13 LBS | BODY MASS INDEX: 43.34 KG/M2 | HEART RATE: 98 BPM | SYSTOLIC BLOOD PRESSURE: 127 MMHG | DIASTOLIC BLOOD PRESSURE: 79 MMHG | RESPIRATION RATE: 12 BRPM | HEIGHT: 65 IN | OXYGEN SATURATION: 100 %

## 2024-08-27 DIAGNOSIS — Z85.46 HISTORY OF PROSTATE CANCER: ICD-10-CM

## 2024-08-27 DIAGNOSIS — E66.01 MORBID OBESITY: ICD-10-CM

## 2024-08-27 DIAGNOSIS — Z85.46 HISTORY OF PROSTATE CANCER: Primary | ICD-10-CM

## 2024-08-27 LAB — COMPLEXED PSA SERPL-MCNC: <0.01 NG/ML (ref 0–4)

## 2024-08-27 PROCEDURE — 1160F RVW MEDS BY RX/DR IN RCRD: CPT | Mod: HCNC,CPTII,S$GLB, | Performed by: UROLOGY

## 2024-08-27 PROCEDURE — 99214 OFFICE O/P EST MOD 30 MIN: CPT | Mod: HCNC,S$GLB,, | Performed by: UROLOGY

## 2024-08-27 PROCEDURE — 36415 COLL VENOUS BLD VENIPUNCTURE: CPT | Mod: HCNC | Performed by: UROLOGY

## 2024-08-27 PROCEDURE — 84153 ASSAY OF PSA TOTAL: CPT | Mod: HCNC | Performed by: UROLOGY

## 2024-08-27 PROCEDURE — 1159F MED LIST DOCD IN RCRD: CPT | Mod: HCNC,CPTII,S$GLB, | Performed by: UROLOGY

## 2024-08-27 PROCEDURE — 1101F PT FALLS ASSESS-DOCD LE1/YR: CPT | Mod: HCNC,CPTII,S$GLB, | Performed by: UROLOGY

## 2024-08-27 PROCEDURE — 1126F AMNT PAIN NOTED NONE PRSNT: CPT | Mod: HCNC,CPTII,S$GLB, | Performed by: UROLOGY

## 2024-08-27 PROCEDURE — 3074F SYST BP LT 130 MM HG: CPT | Mod: HCNC,CPTII,S$GLB, | Performed by: UROLOGY

## 2024-08-27 PROCEDURE — 4010F ACE/ARB THERAPY RXD/TAKEN: CPT | Mod: HCNC,CPTII,S$GLB, | Performed by: UROLOGY

## 2024-08-27 PROCEDURE — 3288F FALL RISK ASSESSMENT DOCD: CPT | Mod: HCNC,CPTII,S$GLB, | Performed by: UROLOGY

## 2024-08-27 PROCEDURE — 3008F BODY MASS INDEX DOCD: CPT | Mod: HCNC,CPTII,S$GLB, | Performed by: UROLOGY

## 2024-08-27 PROCEDURE — 3078F DIAST BP <80 MM HG: CPT | Mod: HCNC,CPTII,S$GLB, | Performed by: UROLOGY

## 2024-08-27 NOTE — Clinical Note
Dr Li, please refer Mr Queen to the [facility] loss program.  He is interested in one of the newer weight loss meds.  Thanks!

## 2024-08-29 ENCOUNTER — TELEPHONE (OUTPATIENT)
Dept: FAMILY MEDICINE | Facility: CLINIC | Age: 74
End: 2024-08-29
Payer: MEDICARE

## 2024-08-29 NOTE — TELEPHONE ENCOUNTER
----- Message from Karin Sandoval sent at 8/28/2024  4:38 PM CDT -----  Regarding: Schedule an appointment  Mr. Queen has a referral for bariatric. Can someone contact him to schedule.

## 2024-09-18 DIAGNOSIS — N18.30 STAGE 3 CHRONIC KIDNEY DISEASE, UNSPECIFIED WHETHER STAGE 3A OR 3B CKD: ICD-10-CM

## 2024-09-19 ENCOUNTER — TELEPHONE (OUTPATIENT)
Dept: FAMILY MEDICINE | Facility: CLINIC | Age: 74
End: 2024-09-19
Payer: MEDICARE

## 2024-09-19 NOTE — TELEPHONE ENCOUNTER
Called patient's spouse ( Terri ) to schedule weight loss appointment. Schedule for 2/07/25 first available appointment and placed on wait list for sooner appointment.

## 2024-09-19 NOTE — TELEPHONE ENCOUNTER
----- Message from Joselin Moon sent at 9/19/2024  1:53 PM CDT -----  Regarding: patient call back  Type: Patient Call Back    Who called: Terri- wife     What is the request in detail: asked for the nurse to call back to schedule PT to lose weight      Can the clinic reply by [facility]? No     Would the patient rather a call back or a response via [facility]? Call     Best call back number: [phone]    Additional Information: he has never had prostate cancer    Asked for no apts before 9 am

## 2024-10-31 ENCOUNTER — OFFICE VISIT (OUTPATIENT)
Dept: UROLOGY | Facility: CLINIC | Age: 74
End: 2024-10-31
Payer: MEDICARE

## 2024-10-31 DIAGNOSIS — N48.83 ACQUIRED BURIED PENIS: Primary | ICD-10-CM

## 2024-10-31 DIAGNOSIS — Z85.46 HISTORY OF PROSTATE CANCER: ICD-10-CM

## 2024-10-31 DIAGNOSIS — E66.01 MORBID OBESITY: ICD-10-CM

## 2024-10-31 PROCEDURE — 4010F ACE/ARB THERAPY RXD/TAKEN: CPT | Mod: HCNC,CPTII,S$GLB, | Performed by: STUDENT IN AN ORGANIZED HEALTH CARE EDUCATION/TRAINING PROGRAM

## 2024-10-31 PROCEDURE — 99213 OFFICE O/P EST LOW 20 MIN: CPT | Mod: HCNC,S$GLB,, | Performed by: STUDENT IN AN ORGANIZED HEALTH CARE EDUCATION/TRAINING PROGRAM

## 2024-11-13 DIAGNOSIS — M47.26 OSTEOARTHRITIS OF SPINE WITH RADICULOPATHY, LUMBAR REGION: ICD-10-CM

## 2024-11-13 DIAGNOSIS — M54.12 CERVICAL MYELOPATHY WITH CERVICAL RADICULOPATHY: ICD-10-CM

## 2024-11-13 DIAGNOSIS — G95.9 CERVICAL MYELOPATHY WITH CERVICAL RADICULOPATHY: ICD-10-CM

## 2024-11-14 RX ORDER — DULOXETIN HYDROCHLORIDE 60 MG/1
60 CAPSULE, DELAYED RELEASE ORAL
Qty: 90 CAPSULE | Refills: 0 | Status: SHIPPED | OUTPATIENT
Start: 2024-11-14

## 2024-11-14 NOTE — TELEPHONE ENCOUNTER
Refill Routing Note   Medication(s) are not appropriate for processing by [facility] for the following reason(s):        Required labs outdated    [facility] action(s):  Defer   Requires labs : Yes             Appointments  past 12m or future 3m with PCP    Date Provider   Last Visit   9/5/2023 Jcarlos Li MD   Next Visit   Visit date not found Jcarlos Li MD   ED visits in past 90 days: 0        Note composed:6:54 PM 11/13/2024

## 2024-11-14 NOTE — TELEPHONE ENCOUNTER
Care Due:                  Date            Visit Type   Department     Provider  --------------------------------------------------------------------------------                                EP [facility]                              PRIMARY      MED/ INTERNAL  Last Visit: 09-      CARE ([facility])   MED/ PEDS      ASHLEY MCMAHON                                           [facility]                              NEW PATIENT   MED/ INTERNAL  Next Visit: 02-      - BARIATRIC  MED/ PEDS      Mayra Yanez                                                            Last  Test          Frequency    Reason                     Performed    Due Date  --------------------------------------------------------------------------------    CMP.........  12 months..  DULoxetine, olmesartan...  09- 09-    HBA1C.......  6 months...  semaglutide..............  09- 03-    [facility] Embedded Care Due Messages. Reference number: [id number].   11/13/2024 6:52:42 PM CST

## 2024-11-15 ENCOUNTER — TELEPHONE (OUTPATIENT)
Dept: FAMILY MEDICINE | Facility: CLINIC | Age: 74
End: 2024-11-15
Payer: MEDICARE

## 2024-11-15 NOTE — TELEPHONE ENCOUNTER
Left voice message for patient to return call to clinic due to needing to schedule an appointment to follow up for medication refill scheduled patient for 11/22/24 @ 1:20pm

## 2024-11-20 DIAGNOSIS — I10 ESSENTIAL HYPERTENSION: Chronic | ICD-10-CM

## 2024-11-20 RX ORDER — AMLODIPINE BESYLATE 5 MG/1
5 TABLET ORAL
Qty: 90 TABLET | Refills: 0 | Status: SHIPPED | OUTPATIENT
Start: 2024-11-20

## 2024-11-20 NOTE — TELEPHONE ENCOUNTER
No care due was identified.  Health [facility] Embedded Care Due Messages. Reference number: [id number].   11/20/2024 11:41:45 AM CST

## 2024-11-20 NOTE — TELEPHONE ENCOUNTER
Refill Decision Note   Bryan Bret  is requesting a refill authorization.  Brief Assessment and Rationale for Refill:  Approve     Medication Therapy Plan:         Comments:     Note composed:5:12 PM 11/20/2024

## 2024-12-30 DIAGNOSIS — I10 ESSENTIAL HYPERTENSION: Chronic | ICD-10-CM

## 2024-12-30 NOTE — TELEPHONE ENCOUNTER
----- Message from Rahul sent at 12/30/2024 10:57 AM CST -----  Regarding: self  Type: Patient Call Back    Who called:self    What is the request in detail:calling to speak to the with the office olmesartan (BENICAR) 20 MG table, stated pharmacy put in a request and it was denied     Can the clinic reply by [facility]?no    Would the patient rather a call back or a response via [facility]? callback    Best call back number: [phone]  Additional Information:

## 2024-12-30 NOTE — TELEPHONE ENCOUNTER
No care due was identified.  [facility] Embedded Care Due Messages. Reference number: [id number].   12/30/2024 2:08:19 PM CST

## 2024-12-30 NOTE — TELEPHONE ENCOUNTER
Spoke with patient. Patient overdue for annual. Patient scheduled for 01/14/25. Patient verbalized understanding. Patient has no other questions or concerns at this time.

## 2024-12-31 RX ORDER — OLMESARTAN MEDOXOMIL 20 MG/1
20 TABLET ORAL DAILY
Qty: 90 TABLET | Refills: 0 | Status: SHIPPED | OUTPATIENT
Start: 2024-12-31

## 2025-01-07 ENCOUNTER — TELEPHONE (OUTPATIENT)
Dept: FAMILY MEDICINE | Facility: CLINIC | Age: 75
End: 2025-01-07
Payer: MEDICARE

## 2025-01-07 NOTE — TELEPHONE ENCOUNTER
Call placed to patient to reschedule appointment  scheduled for 2/7/25 , no answer message left to call clinic back at this time.

## 2025-01-14 ENCOUNTER — TELEPHONE (OUTPATIENT)
Dept: FAMILY MEDICINE | Facility: CLINIC | Age: 75
End: 2025-01-14

## 2025-01-14 ENCOUNTER — LAB VISIT (OUTPATIENT)
Dept: LAB | Facility: HOSPITAL | Age: 75
End: 2025-01-14
Attending: FAMILY MEDICINE
Payer: MEDICARE

## 2025-01-14 ENCOUNTER — OFFICE VISIT (OUTPATIENT)
Dept: FAMILY MEDICINE | Facility: CLINIC | Age: 75
End: 2025-01-14
Payer: MEDICARE

## 2025-01-14 VITALS
HEIGHT: 65 IN | OXYGEN SATURATION: 97 % | BODY MASS INDEX: 44.25 KG/M2 | WEIGHT: 265.56 LBS | HEART RATE: 83 BPM | TEMPERATURE: 98 F | DIASTOLIC BLOOD PRESSURE: 82 MMHG | SYSTOLIC BLOOD PRESSURE: 138 MMHG

## 2025-01-14 DIAGNOSIS — R79.9 ABNORMAL FINDING OF BLOOD CHEMISTRY, UNSPECIFIED: ICD-10-CM

## 2025-01-14 DIAGNOSIS — Z12.5 ENCOUNTER FOR SCREENING FOR MALIGNANT NEOPLASM OF PROSTATE: ICD-10-CM

## 2025-01-14 DIAGNOSIS — N32.89 BLADDER SPASM: ICD-10-CM

## 2025-01-14 DIAGNOSIS — Z00.00 ROUTINE PHYSICAL EXAMINATION: Primary | ICD-10-CM

## 2025-01-14 DIAGNOSIS — Z12.11 ENCOUNTER FOR SCREENING FOR MALIGNANT NEOPLASM OF COLON: ICD-10-CM

## 2025-01-14 DIAGNOSIS — I10 ESSENTIAL HYPERTENSION: Chronic | ICD-10-CM

## 2025-01-14 DIAGNOSIS — E66.01 MORBID OBESITY: ICD-10-CM

## 2025-01-14 DIAGNOSIS — G95.9 CERVICAL MYELOPATHY WITH CERVICAL RADICULOPATHY: ICD-10-CM

## 2025-01-14 DIAGNOSIS — Z23 NEED FOR IMMUNIZATION AGAINST INFLUENZA: ICD-10-CM

## 2025-01-14 DIAGNOSIS — Z00.00 ROUTINE PHYSICAL EXAMINATION: ICD-10-CM

## 2025-01-14 DIAGNOSIS — N18.31 CHRONIC KIDNEY DISEASE, STAGE 3A: ICD-10-CM

## 2025-01-14 DIAGNOSIS — Z23 NEED FOR SHINGLES VACCINE: ICD-10-CM

## 2025-01-14 DIAGNOSIS — M54.12 CERVICAL MYELOPATHY WITH CERVICAL RADICULOPATHY: ICD-10-CM

## 2025-01-14 LAB
ALBUMIN SERPL BCP-MCNC: 3.8 G/DL (ref 3.5–5.2)
ALP SERPL-CCNC: 70 U/L (ref 40–150)
ALT SERPL W/O P-5'-P-CCNC: 19 U/L (ref 10–44)
ANION GAP SERPL CALC-SCNC: 8 MMOL/L (ref 8–16)
AST SERPL-CCNC: 19 U/L (ref 10–40)
BASOPHILS # BLD AUTO: 0.09 K/UL (ref 0–0.2)
BASOPHILS NFR BLD: 1.5 % (ref 0–1.9)
BILIRUB SERPL-MCNC: 0.4 MG/DL (ref 0.1–1)
BUN SERPL-MCNC: 18 MG/DL (ref 8–23)
CALCIUM SERPL-MCNC: 9.8 MG/DL (ref 8.7–10.5)
CHLORIDE SERPL-SCNC: 108 MMOL/L (ref 95–110)
CHOLEST SERPL-MCNC: 157 MG/DL (ref 120–199)
CHOLEST/HDLC SERPL: 4 {RATIO} (ref 2–5)
CO2 SERPL-SCNC: 25 MMOL/L (ref 23–29)
COMPLEXED PSA SERPL-MCNC: <0.01 NG/ML (ref 0–4)
CREAT SERPL-MCNC: 1.3 MG/DL (ref 0.5–1.4)
DIFFERENTIAL METHOD BLD: ABNORMAL
EOSINOPHIL # BLD AUTO: 0.1 K/UL (ref 0–0.5)
EOSINOPHIL NFR BLD: 2.2 % (ref 0–8)
ERYTHROCYTE [DISTWIDTH] IN BLOOD BY AUTOMATED COUNT: 14 % (ref 11.5–14.5)
EST. GFR  (NO RACE VARIABLE): 57.6 ML/MIN/1.73 M^2
ESTIMATED AVG GLUCOSE: 117 MG/DL (ref 68–131)
GLUCOSE SERPL-MCNC: 107 MG/DL (ref 70–110)
HBA1C MFR BLD: 5.7 % (ref 4–5.6)
HCT VFR BLD AUTO: 42.8 % (ref 40–54)
HDLC SERPL-MCNC: 39 MG/DL (ref 40–75)
HDLC SERPL: 24.8 % (ref 20–50)
HGB BLD-MCNC: 13.5 G/DL (ref 14–18)
IMM GRANULOCYTES # BLD AUTO: 0.01 K/UL (ref 0–0.04)
IMM GRANULOCYTES NFR BLD AUTO: 0.2 % (ref 0–0.5)
LDLC SERPL CALC-MCNC: 101.8 MG/DL (ref 63–159)
LYMPHOCYTES # BLD AUTO: 2.6 K/UL (ref 1–4.8)
LYMPHOCYTES NFR BLD: 45.3 % (ref 18–48)
MCH RBC QN AUTO: 30.1 PG (ref 27–31)
MCHC RBC AUTO-ENTMCNC: 31.5 G/DL (ref 32–36)
MCV RBC AUTO: 95 FL (ref 82–98)
MONOCYTES # BLD AUTO: 0.5 K/UL (ref 0.3–1)
MONOCYTES NFR BLD: 8.4 % (ref 4–15)
NEUTROPHILS # BLD AUTO: 2.5 K/UL (ref 1.8–7.7)
NEUTROPHILS NFR BLD: 42.4 % (ref 38–73)
NONHDLC SERPL-MCNC: 118 MG/DL
NRBC BLD-RTO: 0 /100 WBC
PLATELET # BLD AUTO: 385 K/UL (ref 150–450)
PMV BLD AUTO: 9.2 FL (ref 9.2–12.9)
POTASSIUM SERPL-SCNC: 4.8 MMOL/L (ref 3.5–5.1)
PROT SERPL-MCNC: 7.7 G/DL (ref 6–8.4)
RBC # BLD AUTO: 4.49 M/UL (ref 4.6–6.2)
SODIUM SERPL-SCNC: 141 MMOL/L (ref 136–145)
T4 FREE SERPL-MCNC: 0.79 NG/DL (ref 0.71–1.51)
TRIGL SERPL-MCNC: 81 MG/DL (ref 30–150)
TSH SERPL DL<=0.005 MIU/L-ACNC: 2.51 UIU/ML (ref 0.4–4)
WBC # BLD AUTO: 5.81 K/UL (ref 3.9–12.7)

## 2025-01-14 PROCEDURE — 84443 ASSAY THYROID STIM HORMONE: CPT | Performed by: FAMILY MEDICINE

## 2025-01-14 PROCEDURE — 99397 PER PM REEVAL EST PAT 65+ YR: CPT | Mod: 25,S$GLB,, | Performed by: FAMILY MEDICINE

## 2025-01-14 PROCEDURE — 90653 IIV ADJUVANT VACCINE IM: CPT | Mod: S$GLB,,, | Performed by: FAMILY MEDICINE

## 2025-01-14 PROCEDURE — 84439 ASSAY OF FREE THYROXINE: CPT | Performed by: FAMILY MEDICINE

## 2025-01-14 PROCEDURE — 3079F DIAST BP 80-89 MM HG: CPT | Mod: CPTII,S$GLB,, | Performed by: FAMILY MEDICINE

## 2025-01-14 PROCEDURE — 1101F PT FALLS ASSESS-DOCD LE1/YR: CPT | Mod: CPTII,S$GLB,, | Performed by: FAMILY MEDICINE

## 2025-01-14 PROCEDURE — 3288F FALL RISK ASSESSMENT DOCD: CPT | Mod: CPTII,S$GLB,, | Performed by: FAMILY MEDICINE

## 2025-01-14 PROCEDURE — 85025 COMPLETE CBC W/AUTO DIFF WBC: CPT | Performed by: FAMILY MEDICINE

## 2025-01-14 PROCEDURE — 83036 HEMOGLOBIN GLYCOSYLATED A1C: CPT | Performed by: FAMILY MEDICINE

## 2025-01-14 PROCEDURE — 90471 IMMUNIZATION ADMIN: CPT | Mod: 59,S$GLB,, | Performed by: FAMILY MEDICINE

## 2025-01-14 PROCEDURE — 84153 ASSAY OF PSA TOTAL: CPT | Performed by: FAMILY MEDICINE

## 2025-01-14 PROCEDURE — 1125F AMNT PAIN NOTED PAIN PRSNT: CPT | Mod: CPTII,S$GLB,, | Performed by: FAMILY MEDICINE

## 2025-01-14 PROCEDURE — G0008 ADMIN INFLUENZA VIRUS VAC: HCPCS | Mod: S$GLB,,, | Performed by: FAMILY MEDICINE

## 2025-01-14 PROCEDURE — 80053 COMPREHEN METABOLIC PANEL: CPT | Performed by: FAMILY MEDICINE

## 2025-01-14 PROCEDURE — 3008F BODY MASS INDEX DOCD: CPT | Mod: CPTII,S$GLB,, | Performed by: FAMILY MEDICINE

## 2025-01-14 PROCEDURE — 36415 COLL VENOUS BLD VENIPUNCTURE: CPT | Mod: PO | Performed by: FAMILY MEDICINE

## 2025-01-14 PROCEDURE — 90750 HZV VACC RECOMBINANT IM: CPT | Mod: S$GLB,,, | Performed by: FAMILY MEDICINE

## 2025-01-14 PROCEDURE — 99999 PR PBB SHADOW E&M-EST. PATIENT-LVL III: CPT | Mod: PBBFAC,,, | Performed by: FAMILY MEDICINE

## 2025-01-14 PROCEDURE — 80061 LIPID PANEL: CPT | Performed by: FAMILY MEDICINE

## 2025-01-14 PROCEDURE — 3075F SYST BP GE 130 - 139MM HG: CPT | Mod: CPTII,S$GLB,, | Performed by: FAMILY MEDICINE

## 2025-01-14 RX ORDER — OXYBUTYNIN CHLORIDE 5 MG/1
5 TABLET, EXTENDED RELEASE ORAL DAILY
Qty: 30 TABLET | Refills: 11 | Status: SHIPPED | OUTPATIENT
Start: 2025-01-14 | End: 2026-01-14

## 2025-01-14 NOTE — PROGRESS NOTES
[facility]  Progress Note    SUBJECTIVE:     Chief Complaint   Patient presents with    Annual Exam       HPI   Bryan Queen  is a 74 y.o. male here for physical exam and f/u of his chronic conditions. Patient has no other new complaints/problems at this time.      Review of patient's allergies indicates:   Allergen Reactions    Hydroxyzine Nausea And Vomiting and Other (See Comments)     SWEATING    Lyrica [pregabalin] Rash    Tramadol Nausea And Vomiting     Nauseous,Sweating, Ringing in the ears    Vicodin  [hydrocodone-acetaminophen]      Other reaction(s): Rash       Past Medical History:   Diagnosis Date    Allergy     Arthritis     BPH (benign prostatic hypertrophy)     Cancer     prostate    Colon polyps     Elevated PSA     Groin abscess     Hypertension     Joint pain     Lumbar radiculopathy     Obstructive sleep apnea (adult) (pediatric)     CPAP hs    PONV (postoperative nausea and vomiting)     Prostate cancer     Spinal stenosis      Past Surgical History:   Procedure Laterality Date    CERVICAL FUSION  1/27/2009    C3-4 fusion    COLONOSCOPY N/A 9/13/2016    Procedure: COLONOSCOPY;  Surgeon: ROWENA Ahn MD;  Location: [address]);  Service: Endoscopy;  Laterality: N/A;  Patient requested the week of 9/12. Patient reports PONV.    KNEE SURGERY Left 4-13-15    TK    KNEE SURGERY Right 8-19-15    TK    LUMBAR DISCECTOMY  12/3/2009    L5-S1 microdiscex    PROSTATECTOMY  08/16/2017    SPINE SURGERY  04/01/13    L4/5 laminectomy    TONSILLECTOMY       Family History   Problem Relation Name Age of Onset    Diabetes Mother      Hypertension Mother      Diabetes Sister      Asthma Brother WENDOLY     Diabetes Brother WENDOLY     Melanoma Neg Hx      Allergic rhinitis Neg Hx      Allergies Neg Hx      Angioedema Neg Hx      Eczema Neg Hx      Atopy Neg Hx      Immunodeficiency Neg Hx      Rhinitis Neg Hx      Urticaria Neg Hx       Social History     Tobacco Use    Smoking status: Never     Smokeless tobacco: Never   Substance Use Topics    Alcohol use: Yes     Comment: occasional    Drug use: No        Review of Systems   Constitutional:  Negative for chills, diaphoresis and fever.   HENT:  Negative for congestion, ear pain and sore throat.    Eyes:  Negative for photophobia and discharge.   Respiratory:  Negative for cough, shortness of breath and wheezing.    Cardiovascular:  Negative for chest pain and palpitations.   Gastrointestinal:  Negative for abdominal pain, constipation, diarrhea, nausea and vomiting.   Genitourinary:  Positive for frequency. Negative for dysuria, hematuria and urgency.   Musculoskeletal:  Negative for back pain and myalgias.   Skin:  Negative for itching and rash.   Neurological:  Negative for dizziness, sensory change, focal weakness, weakness and headaches.   All other systems reviewed and are negative.    OBJECTIVE:     Vitals:    01/14/25 0920   BP: 138/82   Pulse: 83   Temp: 98.3 °F (36.8 °C)     Body mass index is 44.19 kg/m².    Physical Exam  Constitutional:       General: He is not in acute distress.     Appearance: He is not diaphoretic.   HENT:      Head: Normocephalic and atraumatic.      Right Ear: Tympanic membrane and ear canal normal. No hemotympanum. Tympanic membrane is not perforated, erythematous or bulging.      Left Ear: Tympanic membrane and ear canal normal. No hemotympanum. Tympanic membrane is not perforated, erythematous or bulging.   Eyes:      Conjunctiva/sclera: Conjunctivae normal.      Pupils: Pupils are equal, round, and reactive to light.   Neck:      Thyroid: No thyromegaly.   Cardiovascular:      Rate and Rhythm: Normal rate and regular rhythm.      Heart sounds: Normal heart sounds. No murmur heard.     No friction rub. No gallop.   Pulmonary:      Effort: Pulmonary effort is normal. No respiratory distress.      Breath sounds: Normal breath sounds. No wheezing or rales.   Abdominal:      General: Bowel sounds are normal. There is no  distension.      Palpations: Abdomen is soft.      Tenderness: There is no abdominal tenderness. There is no guarding or rebound.   Musculoskeletal:         General: No tenderness. Normal range of motion.   Skin:     General: Skin is warm.      Findings: No erythema or rash.   Neurological:      Mental Status: He is alert and oriented to person, place, and time.         Old records were reviewed. Labs and/or images were independently reviewed.    ASSESSMENT     1. Routine physical examination    2. Need for immunization against influenza    3. Essential hypertension    4. Abnormal finding of blood chemistry, unspecified    5. Encounter for screening for malignant neoplasm of prostate    6. Morbid obesity    7. Cervical myelopathy with cervical radiculopathy    8. Chronic kidney disease, stage 3a    9. Bladder spasm    10. Encounter for screening for malignant neoplasm of colon    11. Need for shingles vaccine        PLAN:     Routine physical examination  -     CBC Auto Differential; Future  -     Comprehensive Metabolic Panel; Future  -     Hemoglobin A1C; Future  -     Lipid Panel; Future  -     TSH; Future  -     T4, Free; Future  -     PSA, Screening; Future; Expected date: 01/14/2025  -     We briefly discussed diet, exercise, and routine preventive exams. All questions and comments addressed.    Need for immunization against influenza  -     influenza (adjuvanted) (Fluad) 45 mcg/0.5 mL IM vaccine (> or = 64 yo) 0.5 mL    Essential hypertension  -     CBC Auto Differential; Future  -     Comprehensive Metabolic Panel; Future  -     Hemoglobin A1C; Future  -     Lipid Panel; Future  -     TSH; Future  -     T4, Free; Future  -     PSA, Screening; Future; Expected date: 01/14/2025  -     Educated patient to take medications as prescribed, and to record bp logs daily to bring along to next visit. Instructed patient to decrease salt intake. Also told patient to call if any new signs or symptoms  develop.    Encounter for screening for malignant neoplasm of prostate  -     PSA, Screening; Future; Expected date: 01/14/2025    Morbid obesity   -     Counseled patient about healthy diet, exercise habits, and to increase physical activity.    Cervical myelopathy with cervical radiculopathy   -      offered physical therapy but refused at this time.    Chronic kidney disease, stage 3a   -     Stable. Continue current regimen.    Bladder spasm  -     Start oxybutynin (DITROPAN-XL) 5 MG TR24; Take 1 tablet (5 mg total) by mouth once daily.  Dispense: 30 tablet; Refill: 11  -    I have discussed the common side effects of this medication with the patient and answered all of the questions they had at the time of this visit regarding this medication.    Encounter for screening for malignant neoplasm of colon  -     Ambulatory referral/consult to Endo Procedure ; Future; Expected date: 01/15/2025    Need for shingles vaccine  -     varicella zoster (Shingrix) IM vaccine (>/= 51 yo)        RTC PRN    Jcarlos Li MD  01/14/2025 9:38 AM

## 2025-01-14 NOTE — PROGRESS NOTES
Patient tolerated Influenza  and Zoster injection. Advised to wait 15mins. VIS information received.

## 2025-01-14 NOTE — TELEPHONE ENCOUNTER
Called patient patient about appointment and has been reschedule. Patient stated that if he's not able to be reach try calling back. I also mailed a letter out to patient and has been placed on the waiting list.

## 2025-01-15 DIAGNOSIS — N18.31 CHRONIC KIDNEY DISEASE, STAGE 3A: ICD-10-CM

## 2025-01-18 DIAGNOSIS — Z12.11 ENCOUNTER FOR SCREENING FOR MALIGNANT NEOPLASM OF COLON: Primary | ICD-10-CM

## 2025-01-18 DIAGNOSIS — Z86.0100 HISTORY OF COLON POLYPS: ICD-10-CM

## 2025-02-05 ENCOUNTER — TELEPHONE (OUTPATIENT)
Dept: FAMILY MEDICINE | Facility: CLINIC | Age: 75
End: 2025-02-05
Payer: MEDICARE

## 2025-02-05 NOTE — TELEPHONE ENCOUNTER
Left message on voicemail to return call to clinic regarding changing nurse visit 3/14/25 to an afternoon visit or any Tuesday or Thursday morning appointment.

## 2025-02-15 DIAGNOSIS — G95.9 CERVICAL MYELOPATHY WITH CERVICAL RADICULOPATHY: ICD-10-CM

## 2025-02-15 DIAGNOSIS — M54.12 CERVICAL MYELOPATHY WITH CERVICAL RADICULOPATHY: ICD-10-CM

## 2025-02-15 DIAGNOSIS — M47.26 OSTEOARTHRITIS OF SPINE WITH RADICULOPATHY, LUMBAR REGION: ICD-10-CM

## 2025-02-15 NOTE — TELEPHONE ENCOUNTER
No care due was identified.  [facility] Embedded Care Due Messages. Reference number: [id number].   2/15/2025 12:07:19 PM CST

## 2025-02-16 RX ORDER — DULOXETIN HYDROCHLORIDE 60 MG/1
60 CAPSULE, DELAYED RELEASE ORAL DAILY
Qty: 90 CAPSULE | Refills: 3 | Status: SHIPPED | OUTPATIENT
Start: 2025-02-16

## 2025-02-17 NOTE — TELEPHONE ENCOUNTER
Refill Decision Note   Bryan Bret  is requesting a refill authorization.  Brief Assessment and Rationale for Refill:  Approve     Medication Therapy Plan:         Comments:     Note composed:10:20 PM 02/16/2025

## 2025-02-20 DIAGNOSIS — I10 ESSENTIAL HYPERTENSION: Chronic | ICD-10-CM

## 2025-02-20 RX ORDER — AMLODIPINE BESYLATE 5 MG/1
5 TABLET ORAL
Qty: 90 TABLET | Refills: 3 | Status: SHIPPED | OUTPATIENT
Start: 2025-02-20

## 2025-02-20 NOTE — TELEPHONE ENCOUNTER
Refill Decision Note   Bryan Bret  is requesting a refill authorization.  Brief Assessment and Rationale for Refill:  Approve     Medication Therapy Plan:        Comments:     Note composed:1:09 PM 02/20/2025

## 2025-02-20 NOTE — TELEPHONE ENCOUNTER
No care due was identified.  [facility] Embedded Care Due Messages. Reference number: [id number].   2/20/2025 1:00:40 PM CST

## 2025-03-18 ENCOUNTER — TELEPHONE (OUTPATIENT)
Dept: ENDOSCOPY | Facility: HOSPITAL | Age: 75
End: 2025-03-18

## 2025-03-18 ENCOUNTER — CLINICAL SUPPORT (OUTPATIENT)
Dept: ENDOSCOPY | Facility: HOSPITAL | Age: 75
End: 2025-03-18
Attending: FAMILY MEDICINE
Payer: MEDICARE

## 2025-03-18 DIAGNOSIS — Z12.11 ENCOUNTER FOR SCREENING FOR MALIGNANT NEOPLASM OF COLON: ICD-10-CM

## 2025-03-18 DIAGNOSIS — Z86.0100 HISTORY OF COLON POLYPS: ICD-10-CM

## 2025-03-28 DIAGNOSIS — I10 ESSENTIAL HYPERTENSION: Chronic | ICD-10-CM

## 2025-03-28 RX ORDER — OLMESARTAN MEDOXOMIL 20 MG/1
20 TABLET ORAL
Qty: 90 TABLET | Refills: 3 | Status: SHIPPED | OUTPATIENT
Start: 2025-03-28

## 2025-03-28 NOTE — TELEPHONE ENCOUNTER
Refill Decision Note   Bryan Bret  is requesting a refill authorization.  Brief Assessment and Rationale for Refill:  Approve     Medication Therapy Plan:         Comments:     Note composed:3:33 PM 03/28/2025

## 2025-03-28 NOTE — TELEPHONE ENCOUNTER
No care due was identified.  [facility] Embedded Care Due Messages. Reference number: [id number].   3/28/2025 10:39:57 AM CDT

## 2025-04-08 ENCOUNTER — OFFICE VISIT (OUTPATIENT)
Dept: FAMILY MEDICINE | Facility: CLINIC | Age: 75
End: 2025-04-08
Payer: MEDICARE

## 2025-04-08 VITALS
WEIGHT: 265 LBS | DIASTOLIC BLOOD PRESSURE: 86 MMHG | OXYGEN SATURATION: 100 % | SYSTOLIC BLOOD PRESSURE: 142 MMHG | BODY MASS INDEX: 44.1 KG/M2 | HEART RATE: 85 BPM

## 2025-04-08 DIAGNOSIS — E66.01 MORBID OBESITY: ICD-10-CM

## 2025-04-08 DIAGNOSIS — M47.26 OSTEOARTHRITIS OF SPINE WITH RADICULOPATHY, LUMBAR REGION: ICD-10-CM

## 2025-04-08 DIAGNOSIS — G47.33 OSA (OBSTRUCTIVE SLEEP APNEA): ICD-10-CM

## 2025-04-08 DIAGNOSIS — I10 ESSENTIAL HYPERTENSION: Primary | Chronic | ICD-10-CM

## 2025-04-08 DIAGNOSIS — N18.31 CHRONIC KIDNEY DISEASE, STAGE 3A: ICD-10-CM

## 2025-04-08 DIAGNOSIS — E66.01 MORBID OBESITY DUE TO EXCESS CALORIES: Chronic | ICD-10-CM

## 2025-04-08 PROCEDURE — 3288F FALL RISK ASSESSMENT DOCD: CPT | Mod: CPTII,S$GLB,, | Performed by: FAMILY MEDICINE

## 2025-04-08 PROCEDURE — 1101F PT FALLS ASSESS-DOCD LE1/YR: CPT | Mod: CPTII,S$GLB,, | Performed by: FAMILY MEDICINE

## 2025-04-08 PROCEDURE — 99215 OFFICE O/P EST HI 40 MIN: CPT | Mod: S$GLB,,, | Performed by: FAMILY MEDICINE

## 2025-04-08 PROCEDURE — 1159F MED LIST DOCD IN RCRD: CPT | Mod: CPTII,S$GLB,, | Performed by: FAMILY MEDICINE

## 2025-04-08 PROCEDURE — 4010F ACE/ARB THERAPY RXD/TAKEN: CPT | Mod: CPTII,S$GLB,, | Performed by: FAMILY MEDICINE

## 2025-04-08 PROCEDURE — 3044F HG A1C LEVEL LT 7.0%: CPT | Mod: CPTII,S$GLB,, | Performed by: FAMILY MEDICINE

## 2025-04-08 PROCEDURE — 3008F BODY MASS INDEX DOCD: CPT | Mod: CPTII,S$GLB,, | Performed by: FAMILY MEDICINE

## 2025-04-08 PROCEDURE — 3079F DIAST BP 80-89 MM HG: CPT | Mod: CPTII,S$GLB,, | Performed by: FAMILY MEDICINE

## 2025-04-08 PROCEDURE — 99999 PR PBB SHADOW E&M-EST. PATIENT-LVL IV: CPT | Mod: PBBFAC,,, | Performed by: FAMILY MEDICINE

## 2025-04-08 PROCEDURE — G2211 COMPLEX E/M VISIT ADD ON: HCPCS | Mod: S$GLB,,, | Performed by: FAMILY MEDICINE

## 2025-04-08 PROCEDURE — 3077F SYST BP >= 140 MM HG: CPT | Mod: CPTII,S$GLB,, | Performed by: FAMILY MEDICINE

## 2025-04-08 PROCEDURE — 1160F RVW MEDS BY RX/DR IN RCRD: CPT | Mod: CPTII,S$GLB,, | Performed by: FAMILY MEDICINE

## 2025-04-08 NOTE — PROGRESS NOTES
Routine Office Visit     Patient Name: Bryan Queen    : 1950  MRN: [id number]    Subjective     History of Present Illness    CHIEF COMPLAINT:  Patient presents for weight management and to discuss potential weight loss medication options.    HPI:  Patient reports difficulty with weight management and chronic back pain, which impedes physical activity. He has trouble straightening up after prolonged sitting, a condition persisting since his back surgery.    Patient acknowledges minimal exercise. He owns a stationary bike purchased 2 years ago but uses it infrequently for brief periods. He engages in yard work, gardening, and lawn mowing, but recognizes these activities do not constitute dedicated exercise.    Regarding dietary habits, the patient reports a strong preference for sweets, particularly cake, after meals, attributing this to childhood habits. He occasionally substitutes cake with fruits like strawberries.    2025 - 264  Weight goal  <200       Patient's blood pressure is elevated today. He recalls similar elevation during his previous visit, which he attributed to medication non-adherence and rushing to the appointment.    MEDICATIONS:  Patient is on Olmesartan for blood pressure management. He is also taking Gabapentin, Duloxetine, and Amlodipine. He is on Oxybutynin (Ditropan) for urinary incontinence.    MEDICAL HISTORY:  Patient has a history of hypertension and pre-diabetes.    SURGICAL HISTORY:  Patient underwent back surgery some years ago.    ROS:  General: no fever, no chills, +fatigue, no weight gain, no weight loss  Eyes: no vision changes, no redness, no discharge  ENT: no ear pain, no nasal congestion, no sore throat  Cardiovascular: no chest pain, no palpitations, no lower extremity edema  Respiratory: no cough, no shortness of breath  Gastrointestinal: no abdominal pain, no nausea, no vomiting, no diarrhea, no constipation, no blood in stool  Genitourinary: no dysuria, no  hematuria, no frequency, +urinary incontinence  Musculoskeletal: no joint pain, no muscle pain, +difficulty standing up, +pain with movement, +back pain, +joint stiffness, +muscle stiffness  Skin: no rash, no lesion  Neurological: no headache, no dizziness, no numbness, no tingling  Psychiatric: no anxiety, no depression, no sleep difficulty           Objective     BP (!) 142/86 (BP Location: Left arm, Patient Position: Sitting)   Pulse 85   Wt 120.2 kg (264 lb 15.9 oz)   SpO2 100%   BMI 44.10 kg/m²   Physical Exam  Constitutional:       Appearance: Normal appearance. He is obese.   HENT:      Head: Normocephalic and atraumatic.      Nose: Nose normal.   Eyes:      Extraocular Movements: Extraocular movements intact.   Cardiovascular:      Rate and Rhythm: Normal rate and regular rhythm.      Pulses: Normal pulses.      Heart sounds: Normal heart sounds.   Pulmonary:      Effort: Pulmonary effort is normal. No respiratory distress.      Breath sounds: Normal breath sounds.   Abdominal:      General: There is no distension.      Palpations: Abdomen is soft.      Tenderness: There is no abdominal tenderness.   Musculoskeletal:         General: Normal range of motion.      Cervical back: Normal range of motion.   Skin:     General: Skin is warm.   Neurological:      Mental Status: He is alert and oriented to person, place, and time.   Psychiatric:         Mood and Affect: Mood normal.           Assessment     Assessment & Plan      PREDIABETES:   Discussed the impact of sugar intake on health.   Evaluated the patient's condition and confirmed prediabetes diagnosis.   Considered weight loss medications typically used for diabetes and prediabetes.   Recommend lifestyle changes including dietary modifications and exercise to manage prediabetes.    MEDICATION ALLERGIES:   Assessed patient's current medications and allergies.   Noted patient's chart indicates allergies to narcotic agents including Vicodin.   Noted  patient's chart indicates allergies to analgesic agents including Tramadol.    FOLLOW-UP:   Follow up in 4 months.         Problem List Items Addressed This Visit          Neuro    Osteoarthritis of spine with radiculopathy, lumbar region   Discussed relationship between back pain, reduced activity, and weight gain.   Noted the patient reports having back pain for years, especially when sitting for a while and then getting up.   Acknowledged that back pain can lead to reduced activity and weight gain, which in turn exacerbates the back pain.   Recommend gradual increase in exercise, starting with 5 minutes of cycling, to help with back pain and overall health.         Cardiac/Vascular    Essential hypertension - Primary (Chronic)   Noted elevated blood pressure; patient attributes to not taking medication and rushing to appointment.   Measured the patient's blood pressure, which was high today and during the last visit.   Discussed the importance of exercise in reducing blood pressure.   Reviewed current antihypertensive medications including Olmesartan and Amlodipine.   Recommend exercise as a treatment for hypertension, suggesting starting with 5 minutes of cycling and gradually increasing to 20-30 minutes daily.   Instructed the patient to adhere to prescribed medication regimen.         Renal/    Chronic kidney disease, stage 3a  Stable  Continue to monitor          Endocrine    Morbid obesity due to excess calories (Chronic)   Considered weight loss medication options, but patient declined due to preference for lifestyle changes.   Explained mechanism of action for weight loss medications like Wegovy (semaglutide) and potential side effects.   Discussed the patient's weight and the need for weight loss.   Recommend lifestyle changes including dietary modifications and exercise for weight loss.   Discussed potential weight loss medications like Wegovy (semaglutide) and Zepbound (tirzepatide), but noted they  may not be covered by insurance.   Educated on importance of dedicated exercise time for weight loss, beyond daily activities.   Explained the need for gradual increase in exercise duration and intensity.   Advised on the benefits of replacing desserts with healthier alternatives like fruits.   Patient to start using stationary bike for 5 minutes daily, gradually increasing duration to eventually reach 20-30   minutes of daily exercise.   Recommend reducing intake of sweets and cakes.   Patient to replace after-dinner sweets with fruits like strawberries, blueberries, or oranges.   Recommend eating slowly and increasing vegetable intake.   Recommend reducing consumption of fats and carbohydrates.         Other    LENNIE (obstructive sleep apnea)  On cpap        Greater than 45 minutes was spent with this patient with greater than 50% spent with face-to-face counseling      This note was generated with the assistance of ambient listening technology. Verbal consent was obtained by the patient and accompanying visitor(s) for the recording of patient appointment to facilitate this note. I attest to having reviewed and edited the generated note for accuracy, though some syntax or spelling errors may persist. Please contact the author of this note for any clarification.

## 2025-04-11 ENCOUNTER — PATIENT MESSAGE (OUTPATIENT)
Dept: FAMILY MEDICINE | Facility: CLINIC | Age: 75
End: 2025-04-11
Payer: MEDICARE

## 2025-04-23 ENCOUNTER — HOSPITAL ENCOUNTER (EMERGENCY)
Facility: HOSPITAL | Age: 75
Discharge: HOME | End: 2025-04-23
Attending: STUDENT IN AN ORGANIZED HEALTH CARE EDUCATION/TRAINING PROGRAM
Payer: MEDICARE

## 2025-04-23 VITALS
TEMPERATURE: 98 F | WEIGHT: 260 LBS | OXYGEN SATURATION: 98 % | BODY MASS INDEX: 43.27 KG/M2 | SYSTOLIC BLOOD PRESSURE: 131 MMHG | DIASTOLIC BLOOD PRESSURE: 71 MMHG | RESPIRATION RATE: 18 BRPM | HEART RATE: 85 BPM
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
## 2025-04-23 DIAGNOSIS — S50.861A INSECT BITE OF RIGHT FOREARM, INITIAL ENCOUNTER: Primary | ICD-10-CM

## 2025-04-23 DIAGNOSIS — L03.113 CELLULITIS OF RIGHT UPPER EXTREMITY: ICD-10-CM

## 2025-04-23 PROCEDURE — 99284 EMERGENCY DEPT VISIT MOD MDM: CPT

## 2025-04-23 PROCEDURE — 25000003 PHARM REV CODE 250

## 2025-04-23 RX ORDER — MUPIROCIN 20 MG/G
OINTMENT TOPICAL 3 TIMES DAILY
Qty: 22 G | Refills: 0 | Status: SHIPPED | OUTPATIENT
Start: 2025-04-23

## 2025-04-23 RX ORDER — CEPHALEXIN 500 MG/1
500 CAPSULE ORAL 4 TIMES DAILY
Qty: 20 CAPSULE | Refills: 0 | Status: SHIPPED | OUTPATIENT
Start: 2025-04-23 | End: 2025-04-23

## 2025-04-23 RX ORDER — CEPHALEXIN 500 MG/1
500 CAPSULE ORAL 4 TIMES DAILY
Qty: 20 CAPSULE | Refills: 0 | Status: SHIPPED | OUTPATIENT
Start: 2025-04-23 | End: 2025-04-28

## 2025-04-23 RX ORDER — SULFAMETHOXAZOLE AND TRIMETHOPRIM 800; 160 MG/1; MG/1
2 TABLET ORAL 2 TIMES DAILY
Qty: 28 TABLET | Refills: 0 | Status: SHIPPED | OUTPATIENT
Start: 2025-04-23 | End: 2025-04-23

## 2025-04-23 RX ORDER — SULFAMETHOXAZOLE AND TRIMETHOPRIM 800; 160 MG/1; MG/1
2 TABLET ORAL 2 TIMES DAILY
Qty: 20 TABLET | Refills: 0 | Status: SHIPPED | OUTPATIENT
Start: 2025-04-23 | End: 2025-04-28

## 2025-04-23 RX ORDER — MUPIROCIN 20 MG/G
1 OINTMENT TOPICAL
Status: COMPLETED | OUTPATIENT
Start: 2025-04-23 | End: 2025-04-23

## 2025-04-23 RX ADMIN — MUPIROCIN 1 TUBE: 20 OINTMENT TOPICAL at 05:04

## 2025-04-23 NOTE — DISCHARGE INSTRUCTIONS
Take antibiotics as prescribed.  Keep the area clean and dry.  Follow up with primary care doctor.  Thank you for coming to our Emergency Department today. It is important to remember that some problems or medical conditions are difficult to diagnose and may not be found or addressed during your Emergency Department visit.  These conditions often start with non-specific symptoms and can only be diagnosed on follow up visits with your primary care physician or specialist when the symptoms continue or change. Please remember that all medical conditions can change, and we cannot predict how you will be feeling tomorrow or the next day. Return to the ER with any questions/concerns, new/concerning symptoms, worsening or failure to improve.       Be sure to follow up with your primary care doctor and review all labs/imaging/tests that were performed during your ER visit with them. It is very common for us to identify non-emergent incidental findings which must be followed up with your primary care physician.  Some labs/imaging/tests may be outside of the normal range, and require non-emergent follow-up and/or further investigation/treatment/procedures/testing to help diagnose/exclude/prevent complications or other potentially serious medical conditions. Some abnormalities may not have been discussed or addressed during your ER visit. Some lab results may not return during your ER visit but can be accessible by downloading the free [facility] darrian or by visiting [email or web address] . It is important for you to review all labs/imaging/tests which are outside of the normal range with your physician.    An ER visit does not replace a primary care visit, and many screening tests or follow-up tests cannot be ordered by an ER doctor or performed by the ER. Some tests may even require pre-approval.    If you do not have a primary care doctor, you may contact the one listed on your discharge paperwork or you may also call  the [facility] Appointment Desk at [phone] , or [address] [place] [address] at  [phone] to schedule an appointment, or establish care with a primary care doctor or even a specialist and to obtain information about local resources. It is important to your health that you have a primary care doctor.    Please take all medications as directed. We have done our best to select a medication for you that will treat your condition however, all medications may potentially have side-effects and it is impossible to predict which medications may give you side-effects or what those side-effects (if any) those medications may give you.  If you feel that you are having a negative effect or side-effect of any medication you should stop taking those medications immediately and seek medical attention. If you feel that you are having a life-threatening reaction call 911.        Do not drive, swim, climb to height, take a bath, operate heavy machinery, drink alcohol or take potentially sedating medications, sign any legal documents or make any important decisions for 24 hours if you have received any pain medications, sedatives or mood altering drugs during your ER visit or within 24 hours of taking them if they have been prescribed to you.     You can find additional resources for Dentists, hearing aids, durable medical equipment, low cost pharmacies and other resources at [email or web address]

## 2025-04-23 NOTE — ED PROVIDER NOTES
Encounter Date: 4/23/2025    SCRIBE #1 NOTE: I, Lee Ann Amaro, am scribing for, and in the presence of,  Buddy Wiggins PA-C. I have scribed the following portions of the note - Other sections scribed: HPI/ROS.       History     Chief Complaint   Patient presents with    Insect Bite     Pt complaining of worsening insect bite to right posterior forearm with green drainage x 1 week unrelieved by otc meds. Pt reports same bite on left arm that has hardened.      Patient is a 74 y.o. male, with a PMHx of HTN, LENNIE (on cpap), CKD stage 3a, who presents to the ED with insect bite to right forearm onset 1 week. Pt was working in his yard when an insect bit him, didn't see it but felt it. Patient reports insect bite starting out as a bump, but he scratched it and its gotten worse. There is drainage from the wound. Pt has used neosporin on skin and is taking benadryl. Pt notes he had insect bite on left forearm which has healed.  No other exacerbating or alleviating factors. Denies fever, nausea, vomiting, chest pain, shortness of breath, diarrhea, abdominal pain or other associated symptoms.       The history is provided by the patient.     Review of patient's allergies indicates:   Allergen Reactions    Hydroxyzine Nausea And Vomiting and Other (See Comments)     SWEATING    Lyrica [pregabalin] Rash    Tramadol Nausea And Vomiting     Nauseous,Sweating, Ringing in the ears    Vicodin  [hydrocodone-acetaminophen]      Other reaction(s): Rash     Past Medical History:   Diagnosis Date    Allergy     Arthritis     BPH (benign prostatic hypertrophy)     Cancer     prostate    Colon polyps     Elevated PSA     Groin abscess     Hypertension     Joint pain     Lumbar radiculopathy     Obstructive sleep apnea (adult) (pediatric)     CPAP hs    PONV (postoperative nausea and vomiting)     Prostate cancer     Spinal stenosis      Past Surgical History:   Procedure Laterality Date    CERVICAL FUSION  1/27/2009    C3-4 fusion     COLONOSCOPY N/A 9/13/2016    Procedure: COLONOSCOPY;  Surgeon: ROWENA Ahn MD;  Location: [facility] ([address] [place] [address]);  Service: Endoscopy;  Laterality: N/A;  Patient requested the week of 9/12. Patient reports PONV.    KNEE SURGERY Left 4-13-15    TK    KNEE SURGERY Right 8-19-15    TK    LUMBAR DISCECTOMY  12/3/2009    L5-S1 microdiscex    PROSTATECTOMY  08/16/2017    SPINE SURGERY  04/01/13    L4/5 laminectomy    TONSILLECTOMY       Family History   Problem Relation Name Age of Onset    Diabetes Mother      Hypertension Mother      Diabetes Sister      Asthma Brother WENDOLY     Diabetes Brother WENDOLY     Melanoma Neg Hx      Allergic rhinitis Neg Hx      Allergies Neg Hx      Angioedema Neg Hx      Eczema Neg Hx      Atopy Neg Hx      Immunodeficiency Neg Hx      Rhinitis Neg Hx      Urticaria Neg Hx       Social History[1]  Review of Systems   Constitutional:  Negative for chills and fever.   HENT:  Negative for sore throat.    Eyes:  Negative for visual disturbance.   Respiratory:  Negative for shortness of breath.    Cardiovascular:  Negative for chest pain.   Gastrointestinal:  Negative for abdominal pain, diarrhea, nausea and vomiting.   Genitourinary:  Negative for dysuria.   Musculoskeletal:  Negative for back pain and myalgias.   Skin:  Positive for wound (insect bite to right forearm). Negative for rash.   Neurological:  Negative for dizziness, syncope, weakness and headaches.       Physical Exam     Initial Vitals [04/23/25 1444]   BP Pulse Resp Temp SpO2   137/74 89 18 98.2 °F (36.8 °C) 97 %      MAP       --         Physical Exam    Constitutional: He appears well-developed and well-nourished.  Non-toxic appearance. He does not have a sickly appearance. No distress.   HENT:   Head: Normocephalic and atraumatic.   Right Ear: External ear normal.   Left Ear: External ear normal. Mouth/Throat: No trismus in the jaw.   Eyes: Conjunctivae and lids are normal.   Neck: Trachea normal. Neck supple. No  tracheal deviation present.   Normal range of motion.  Cardiovascular:  Normal rate, regular rhythm, S1 normal, S2 normal and normal heart sounds.     Exam reveals no S3 and no S4.       Pulmonary/Chest: Effort normal and breath sounds normal. No tachypnea and no bradypnea. No respiratory distress. He has no decreased breath sounds. He has no wheezes. He has no rhonchi. He has no rales.   Abdominal: Abdomen is soft. He exhibits no distension.   Musculoskeletal:      Cervical back: Normal range of motion and neck supple.     Neurological: He is alert and oriented to person, place, and time. GCS eye subscore is 4. GCS verbal subscore is 5. GCS motor subscore is 6.   Skin: Skin is warm and dry. Capillary refill takes less than 2 seconds. No rash noted.   There is a 2 cm wound with on the right forearm with surrounding erythema and cellulitis.  The wound is not tender to palpation.  There is no induration.  There is some pus drainage from the wound.  Patient has full range of motion in right hand, wrist, and elbow.  2+ radial pulses.  Capillary refill less than 2 seconds.  Neurovascularly intact.   Psychiatric: He has a normal mood and affect. His behavior is normal. Judgment and thought content normal.         ED Course   Procedures  Labs Reviewed - No data to display       Imaging Results    None          Medications   mupirocin 2 % ointment 1 Tube (1 Tube Topical (Top) Given 4/23/25 1704)     Medical Decision Making  Encounter Date: 4/23/2025    74 y.o. male presents for evaluation of nsect bite to right forearm onset 1 week. Pt was working in his yard when an insect bit him, didn't see it but felt it. Patient reports insect bite starting out as a bump, but he scratched it and its gotten worse.  Hemodynamically stable. Afebrile. Phonating and protecting the airway spontaneously. No clinical evidence for cardiovascular instability or impending airway compromise.  Remainder of physical exam as above.   Prior medical  records reviewed. PMD note reviewed. Current co-morbidities considered that will impact clinical decision making include as above.   Vitals range:   Temp:  [98.2 °F (36.8 °C)] 98.2 °F (36.8 °C)  Pulse:  [85-89] 85  Resp:  [18] 18  SpO2:  [97 %-98 %] 98 %  BP: (131-137)/(71-74) 131/71    Differential diagnoses includes but is not limited to: Abscess, cellulitis, necrotizing fasciitis, osteomyelitis, septic joint, sepsis    There is a 2 cm wound with on the right forearm with surrounding erythema and cellulitis.  The wound is not tender to palpation.  There is no induration.  There is some pus drainage from the wound.  Patient has full range of motion in right hand, wrist, and elbow. No signs of spread of bacteria into deeper layers of the skin.  I am less concerned for osteomyelitis. No visible bulla or necrosis.  No severe pain.  Symptom onset seven days ago.  I am not concerned for necrotizing fasciitis.  Patient's vitals are stable.  No tachycardia no fever.  No systemic symptoms.  I am not concerned for sepsis.  Patient has full range of motion in hand, wrist, elbow.  I have low suspicion for septic joint.  No other complaints and Neurovascularly intact.    I cleaned the wound with saline.  Mupirocin ointment was applied to the wound and dressed with a nonadherent dressing.  I will discharge patient home on Keflex and Bactrim for cellulitis.  I advised patient to follow up with primary care doctor.  I advised patient to keep the wound clean and dry.  Clinical picture today most consistent with cellulitis.   Doubt alternate pathology as listed in the differential above.    Cautious return precautions discussed with patient and/or family with understanding. Prompt f/u with primary care physician discussed. All questions answered. Patient comfortable with plan.       ED MEDS GIVEN:  Medications  mupirocin 2 % ointment 1 Tube (1 Tube Topical (Top) Given 4/23/25 1704)    Clinical Impression:  Final  diagnoses:  [S50.861A, W57.XXXA] Insect bite of right forearm, initial encounter (Primary)  [L03.113] Cellulitis of right upper extremity       I discussed with the patient/family the diagnosis, treatment plan, indications for return to the emergency department, and for expected follow-up. The patient/family verbalized an understanding. The patient/family is asked if there are any questions or concerns. We discuss the case, until all issues are addressed to the patient/family's satisfaction. Patient/family understands and is agreeable to the plan.   Buddy Franco    DISCLAIMER: This note was prepared with [employer] voice recognition transcription software. Garbled syntax, mangled pronouns, and other bizarre constructions may be attributed to that software system.      Risk  Prescription drug management.            Scribe Attestation:   Scribe #1: I performed the above scribed service and the documentation accurately describes the services I performed. I attest to the accuracy of the note.                           I, Buddy Franco PA-C, personally performed the services described in this documentation. All medical record entries made by the scribe were at my direction and in my presence. I have reviewed the chart and agree that the record reflects my personal performance and is accurate and complete.      DISCLAIMER: This note was prepared with [employer] voice recognition transcription software. Garbled syntax, mangled pronouns, and other bizarre constructions may be attributed to that software system.      Clinical Impression:  Final diagnoses:  [S50.861A, W57.XXXA] Insect bite of right forearm, initial encounter (Primary)  [L03.113] Cellulitis of right upper extremity          ED Disposition Condition    Discharge Good          ED Prescriptions       Medication Sig Dispense Start Date End Date Auth. Provider    cephALEXin (KEFLEX) 500 MG capsule  (Status: Discontinued) Take 1 capsule (500 mg total) by mouth 4  (four) times daily. for 5 days 20 capsule 4/23/2025 4/23/2025 Perilloux, Buddy R, PA-C    sulfamethoxazole-trimethoprim 800-160mg (BACTRIM DS) 800-160 mg Tab  (Status: Discontinued) Take 2 tablets by mouth 2 (two) times daily. for 7 days 28 tablet 4/23/2025 4/23/2025 Perilloux, Buddy R, PA-C    sulfamethoxazole-trimethoprim 800-160mg (BACTRIM DS) 800-160 mg Tab Take 2 tablets by mouth 2 (two) times daily. for 5 days 20 tablet 4/23/2025 4/28/2025 Perilloux, Buddy R, PA-C    cephALEXin (KEFLEX) 500 MG capsule Take 1 capsule (500 mg total) by mouth 4 (four) times daily. for 5 days 20 capsule 4/23/2025 4/28/2025 Perilloux, Buddy R, PA-C    mupirocin (BACTROBAN) 2 % ointment Apply topically 3 (three) times daily. 22 g 4/23/2025 -- Perilloux, Buddy R, PA-C          Follow-up Information       Follow up With Specialties Details Why Contact Info    [facility] [place] [facility] Emergency Medicine Go to  As needed, If symptoms worsen [address] [facility] [place] [address] [phone]    Jcarlos Li MD Family Medicine Schedule an appointment as soon as possible for a visit in 1 day For further evaluation, more definitive management of symptoms [address] [place] [id number]  [phone]                   [1]   Social History  Tobacco Use    Smoking status: Never    Smokeless tobacco: Never   Substance Use Topics    Alcohol use: Yes     Comment: occasional    Drug use: No        Buddy Franco R PA-C  04/23/25 1936

## 2025-06-09 ENCOUNTER — PATIENT MESSAGE (OUTPATIENT)
Dept: ADMINISTRATIVE | Facility: HOSPITAL | Age: 75
End: 2025-06-09
Payer: MEDICARE

## (undated) DEVICE — STAPLER ECHELON FLEX GST 45MM

## (undated) DEVICE — SUT PDS II 1 CT VIL MONO 36

## (undated) DEVICE — DRESSING TRANS 2X2 TEGADERM

## (undated) DEVICE — DRAPE COLUMN DAVINCI XI

## (undated) DEVICE — Device

## (undated) DEVICE — SUT MONOCRYL 2-0 UR6 UNDYED

## (undated) DEVICE — DRESSING TEGADERM CHG 4X4.5

## (undated) DEVICE — GAUZE SPONGE 4X4 12PLY

## (undated) DEVICE — KIT SURGIFLO HEMOSTATIC MATRIX

## (undated) DEVICE — CONTAINER SPECIMEN STRL 4OZ

## (undated) DEVICE — KIT WING PAD POSITIONING

## (undated) DEVICE — CLIP HEMO-LOK MLX LARGE LF

## (undated) DEVICE — RELOAD ECHELON ENDOPATH 45MM

## (undated) DEVICE — SOL CLEARIFY VISUALIZATION LAP

## (undated) DEVICE — KIT ROBOTIC 4 ARM DA VINCI SI

## (undated) DEVICE — ELECTRODE REM PLYHSV RETURN 9

## (undated) DEVICE — SOL BETADINE 5%

## (undated) DEVICE — SOL WATER STRL IRR 1000ML

## (undated) DEVICE — DRAPE ARM DAVINCI XI

## (undated) DEVICE — SUT ABS CLIP LAPRA-TY CTD

## (undated) DEVICE — IRRIGATOR ENDOSCOPY DISP.

## (undated) DEVICE — NDL SAFETY 21G X 1 1/2 ECLPSE

## (undated) DEVICE — BAG TISSUE RETRIEVAL 225ML

## (undated) DEVICE — SEE MEDLINE ITEM 152622

## (undated) DEVICE — HEMOSTAT SURGICEL 4X8IN

## (undated) DEVICE — SET TRI-LUMEN FILTERED TUBE

## (undated) DEVICE — NDL INSUF ULTRA VERESS 120MM

## (undated) DEVICE — PORT ACCESS 5MM W/120MM

## (undated) DEVICE — DRESSING TELFA N ADH 3X8

## (undated) DEVICE — INSERT HARMONIC ACE CURVED

## (undated) DEVICE — SYR 50ML CATH TIP

## (undated) DEVICE — GLOVE BIOGEL SKINSENSE PI 7.0

## (undated) DEVICE — TROCAR ENDOPATH XCEL 12X100MM

## (undated) DEVICE — SEE MEDLINE ITEM 156923

## (undated) DEVICE — SOL ELECTROLUBE ANTI-STIC

## (undated) DEVICE — SUT MONOCRYL PLUS 2-0 UR-6

## (undated) DEVICE — STAPLER SKIN ROTATING HEAD

## (undated) DEVICE — SPONGE SURGIFOAM 100 8.5X12X10

## (undated) DEVICE — DRESSING TEGADERM 2X2 3/4

## (undated) DEVICE — DEVICE ANC SW STAT FOLEY 6-24

## (undated) DEVICE — BAG URINARY DRN 2000ML

## (undated) DEVICE — DRESSING TRANS 4X4 TEGADERM

## (undated) DEVICE — SUT 0 VICRYL PLUS CT-1 27IN

## (undated) DEVICE — OBTURATOR 8MM BLADELESS

## (undated) DEVICE — POSITIONER BODY WEDGE 45 DEG

## (undated) DEVICE — SEAL UNIVERSAL 5MM-8MM XI

## (undated) DEVICE — JELLY KY LUBRICATING 5G PACKET

## (undated) DEVICE — SUTURE CV6 36IN ON TTC-13 NDL